# Patient Record
Sex: FEMALE | Race: WHITE | NOT HISPANIC OR LATINO
[De-identification: names, ages, dates, MRNs, and addresses within clinical notes are randomized per-mention and may not be internally consistent; named-entity substitution may affect disease eponyms.]

---

## 2018-11-13 ENCOUNTER — OTHER (OUTPATIENT)
Age: 83
End: 2018-11-13

## 2018-12-10 ENCOUNTER — APPOINTMENT (OUTPATIENT)
Dept: INTERNAL MEDICINE | Facility: CLINIC | Age: 83
End: 2018-12-10

## 2019-03-04 ENCOUNTER — RX RENEWAL (OUTPATIENT)
Age: 84
End: 2019-03-04

## 2019-04-26 ENCOUNTER — APPOINTMENT (OUTPATIENT)
Dept: INTERNAL MEDICINE | Facility: CLINIC | Age: 84
End: 2019-04-26

## 2019-05-02 ENCOUNTER — RECORD ABSTRACTING (OUTPATIENT)
Age: 84
End: 2019-05-02

## 2019-05-02 ENCOUNTER — APPOINTMENT (OUTPATIENT)
Dept: INTERNAL MEDICINE | Facility: CLINIC | Age: 84
End: 2019-05-02
Payer: MEDICARE

## 2019-05-02 VITALS — WEIGHT: 165 LBS | BODY MASS INDEX: 31.97 KG/M2 | HEIGHT: 60.25 IN

## 2019-05-02 VITALS — SYSTOLIC BLOOD PRESSURE: 115 MMHG | DIASTOLIC BLOOD PRESSURE: 70 MMHG

## 2019-05-02 DIAGNOSIS — Z87.891 PERSONAL HISTORY OF NICOTINE DEPENDENCE: ICD-10-CM

## 2019-05-02 DIAGNOSIS — S99.929A UNSPECIFIED INJURY OF UNSPECIFIED FOOT, INITIAL ENCOUNTER: ICD-10-CM

## 2019-05-02 DIAGNOSIS — R19.4 CHANGE IN BOWEL HABIT: ICD-10-CM

## 2019-05-02 DIAGNOSIS — Z85.828 PERSONAL HISTORY OF OTHER MALIGNANT NEOPLASM OF SKIN: ICD-10-CM

## 2019-05-02 DIAGNOSIS — I50.30 UNSPECIFIED DIASTOLIC (CONGESTIVE) HEART FAILURE: ICD-10-CM

## 2019-05-02 DIAGNOSIS — Z80.0 FAMILY HISTORY OF MALIGNANT NEOPLASM OF DIGESTIVE ORGANS: ICD-10-CM

## 2019-05-02 DIAGNOSIS — Z87.19 PERSONAL HISTORY OF OTHER DISEASES OF THE DIGESTIVE SYSTEM: ICD-10-CM

## 2019-05-02 DIAGNOSIS — R63.4 ABNORMAL WEIGHT LOSS: ICD-10-CM

## 2019-05-02 DIAGNOSIS — Z92.89 PERSONAL HISTORY OF OTHER MEDICAL TREATMENT: ICD-10-CM

## 2019-05-02 LAB — INR PPP: 3.1 RATIO

## 2019-05-02 PROCEDURE — 99214 OFFICE O/P EST MOD 30 MIN: CPT | Mod: 25

## 2019-05-02 PROCEDURE — 85610 PROTHROMBIN TIME: CPT | Mod: QW

## 2019-05-02 RX ORDER — UBIDECARENONE/VIT E ACET 100MG-5
1000 CAPSULE ORAL DAILY
Refills: 0 | Status: ACTIVE | COMMUNITY

## 2019-05-02 RX ORDER — ASPIRIN 81 MG/1
81 TABLET ORAL
Qty: 90 | Refills: 0 | Status: ACTIVE | COMMUNITY

## 2019-05-02 NOTE — REVIEW OF SYSTEMS
[Lower Ext Edema] : lower extremity edema [Dyspnea on Exertion] : dyspnea on exertion [Joint Pain] : joint pain [Joint Stiffness] : joint stiffness [Back Pain] : back pain [Negative] : Heme/Lymph [Chest Pain] : no chest pain [Palpitations] : no palpitations [Leg Claudication] : no leg claudication [Orthopnea] : no orthopnea [Paroysmal Nocturnal Dyspnea] : no paroysmal nocturnal dyspnea [Wheezing] : no wheezing [Cough] : no cough [Muscle Pain] : no muscle pain

## 2019-05-02 NOTE — PHYSICAL EXAM
[No Acute Distress] : no acute distress [Well Nourished] : well nourished [Well Developed] : well developed [Well-Appearing] : well-appearing [Normal Sclera/Conjunctiva] : normal sclera/conjunctiva [PERRL] : pupils equal round and reactive to light [EOMI] : extraocular movements intact [Normal Outer Ear/Nose] : the outer ears and nose were normal in appearance [Normal Oropharynx] : the oropharynx was normal [No JVD] : no jugular venous distention [Supple] : supple [No Lymphadenopathy] : no lymphadenopathy [Thyroid Normal, No Nodules] : the thyroid was normal and there were no nodules present [No Respiratory Distress] : no respiratory distress  [Clear to Auscultation] : lungs were clear to auscultation bilaterally [No Accessory Muscle Use] : no accessory muscle use [Normal Rate] : normal rate  [Regular Rhythm] : with a regular rhythm [Normal S1, S2] : normal S1 and S2 [II] : a grade 2 [I] : a grade 1 [___ +] : bilateral [unfilled]U+ pretibial pitting edema [No Carotid Bruits] : no carotid bruits [No Abdominal Bruit] : a ~M bruit was not heard ~T in the abdomen [Pedal Pulses Present] : the pedal pulses are present [No Extremity Clubbing/Cyanosis] : no extremity clubbing/cyanosis [No Palpable Aorta] : no palpable aorta [Soft] : abdomen soft [Non Tender] : non-tender [Non-distended] : non-distended [No Masses] : no abdominal mass palpated [No HSM] : no HSM [Normal Bowel Sounds] : normal bowel sounds [Normal Posterior Cervical Nodes] : no posterior cervical lymphadenopathy [Normal Anterior Cervical Nodes] : no anterior cervical lymphadenopathy [No CVA Tenderness] : no CVA  tenderness [No Spinal Tenderness] : no spinal tenderness [No Joint Swelling] : no joint swelling [Grossly Normal Strength/Tone] : grossly normal strength/tone [No Rash] : no rash [Coordination Grossly Intact] : coordination grossly intact [No Focal Deficits] : no focal deficits [Deep Tendon Reflexes (DTR)] : deep tendon reflexes were 2+ and symmetric [Normal Affect] : the affect was normal [Normal Insight/Judgement] : insight and judgment were intact

## 2019-05-02 NOTE — PLAN
[FreeTextEntry1] : Decrease warfarin to half pill Monday Wednesday Friday and Saturday and a whole pill on Tuesday Thursday and Sunday\par Same other medicines

## 2019-05-02 NOTE — HISTORY OF PRESENT ILLNESS
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

## 2019-05-02 NOTE — HISTORY OF PRESENT ILLNESS
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

## 2019-05-30 ENCOUNTER — APPOINTMENT (OUTPATIENT)
Dept: INTERNAL MEDICINE | Facility: CLINIC | Age: 84
End: 2019-05-30
Payer: MEDICARE

## 2019-05-30 VITALS
HEIGHT: 60.25 IN | SYSTOLIC BLOOD PRESSURE: 130 MMHG | DIASTOLIC BLOOD PRESSURE: 68 MMHG | BODY MASS INDEX: 31.78 KG/M2 | WEIGHT: 164 LBS

## 2019-05-30 LAB — INR PPP: 2.5 RATIO

## 2019-05-30 PROCEDURE — 99214 OFFICE O/P EST MOD 30 MIN: CPT | Mod: 25

## 2019-05-30 PROCEDURE — 85610 PROTHROMBIN TIME: CPT | Mod: QW

## 2019-05-30 NOTE — HISTORY OF PRESENT ILLNESS
[FreeTextEntry1] : Here to follow ongoing problems as well as check INR on warfarin [de-identified] :  past deep venous thrombosis and pulmonary embolus on lifelong Coumadin\par Slow gradual weight loss over the years continues with overall improvement in her clinical state\par Negative workups for this in the past\par Chronic dyspnea on exertion which is improved \par limited by legs and by back issues\par No falls\par  right bundle branch block and venous stasis dermatitis\par Hypertension well controlled\par Hyperlipidemia well controlled

## 2019-05-30 NOTE — ASSESSMENT
[FreeTextEntry1] : INR perfect at 2.5\par doing very well with excellent control of blood pressure\par venous stasis changes and mild edema\par last A1C 5.9 will check next visit

## 2019-06-27 ENCOUNTER — APPOINTMENT (OUTPATIENT)
Dept: INTERNAL MEDICINE | Facility: CLINIC | Age: 84
End: 2019-06-27
Payer: MEDICARE

## 2019-06-27 VITALS
DIASTOLIC BLOOD PRESSURE: 70 MMHG | BODY MASS INDEX: 31.78 KG/M2 | HEIGHT: 60.25 IN | SYSTOLIC BLOOD PRESSURE: 132 MMHG | WEIGHT: 164 LBS

## 2019-06-27 LAB — INR PPP: 1.8 RATIO

## 2019-06-27 PROCEDURE — 85610 PROTHROMBIN TIME: CPT | Mod: QW

## 2019-06-27 PROCEDURE — 99213 OFFICE O/P EST LOW 20 MIN: CPT | Mod: 25

## 2019-06-27 NOTE — PHYSICAL EXAM
[No Acute Distress] : no acute distress [Well Nourished] : well nourished [Well Developed] : well developed [Well-Appearing] : well-appearing [Normal Sclera/Conjunctiva] : normal sclera/conjunctiva [PERRL] : pupils equal round and reactive to light [EOMI] : extraocular movements intact [Normal Outer Ear/Nose] : the outer ears and nose were normal in appearance [Normal Oropharynx] : the oropharynx was normal [No JVD] : no jugular venous distention [Supple] : supple [Thyroid Normal, No Nodules] : the thyroid was normal and there were no nodules present [No Lymphadenopathy] : no lymphadenopathy [No Respiratory Distress] : no respiratory distress  [Clear to Auscultation] : lungs were clear to auscultation bilaterally [Regular Rhythm] : with a regular rhythm [No Accessory Muscle Use] : no accessory muscle use [Normal S1, S2] : normal S1 and S2 [Bradycardia] : bradycardic [II] : a grade 2 [I] : a grade 1 [___ +] : bilateral [unfilled]U+ pretibial pitting edema [No Carotid Bruits] : no carotid bruits [No Abdominal Bruit] : a ~M bruit was not heard ~T in the abdomen [Pedal Pulses Present] : the pedal pulses are present [No Extremity Clubbing/Cyanosis] : no extremity clubbing/cyanosis [No Palpable Aorta] : no palpable aorta [Soft] : abdomen soft [Non Tender] : non-tender [Non-distended] : non-distended [No HSM] : no HSM [No Masses] : no abdominal mass palpated [Normal Posterior Cervical Nodes] : no posterior cervical lymphadenopathy [Normal Bowel Sounds] : normal bowel sounds [Normal Anterior Cervical Nodes] : no anterior cervical lymphadenopathy [No CVA Tenderness] : no CVA  tenderness [No Spinal Tenderness] : no spinal tenderness [No Joint Swelling] : no joint swelling [No Rash] : no rash [Grossly Normal Strength/Tone] : grossly normal strength/tone [Coordination Grossly Intact] : coordination grossly intact [No Focal Deficits] : no focal deficits [Deep Tendon Reflexes (DTR)] : deep tendon reflexes were 2+ and symmetric [Normal Affect] : the affect was normal [Normal Insight/Judgement] : insight and judgment were intact

## 2019-06-27 NOTE — REVIEW OF SYSTEMS
[Lower Ext Edema] : lower extremity edema [Joint Pain] : joint pain [Joint Stiffness] : joint stiffness [Back Pain] : back pain [Negative] : Heme/Lymph [Leg Claudication] : no leg claudication [Chest Pain] : no chest pain [Palpitations] : no palpitations [Wheezing] : no wheezing [Paroysmal Nocturnal Dyspnea] : no paroysmal nocturnal dyspnea [Orthopnea] : no orthopnea [Cough] : no cough [Dyspnea on Exertion] : no dyspnea on exertion [Muscle Pain] : no muscle pain

## 2019-06-27 NOTE — HISTORY OF PRESENT ILLNESS
[FreeTextEntry1] : here to follow ongoing problems as well as check INR on warfarin [de-identified] :  past deep venous thrombosis and pulmonary embolus on lifelong Coumadin\par dyspnea on exertion which is improved \par limited by legs and by back issues\par No falls\par  right bundle branch block and venous stasis dermatitis\par Hypertension well controlled\par Hyperlipidemia well controlled\par

## 2019-06-27 NOTE — ASSESSMENT
[FreeTextEntry1] : INR low at 1.8\par doing very well\par BP well controlled\par venous stasis changes and mild edema

## 2019-07-18 ENCOUNTER — APPOINTMENT (OUTPATIENT)
Dept: INTERNAL MEDICINE | Facility: CLINIC | Age: 84
End: 2019-07-18
Payer: MEDICARE

## 2019-07-18 ENCOUNTER — RX RENEWAL (OUTPATIENT)
Age: 84
End: 2019-07-18

## 2019-07-18 VITALS — DIASTOLIC BLOOD PRESSURE: 70 MMHG | SYSTOLIC BLOOD PRESSURE: 130 MMHG

## 2019-07-18 VITALS — HEIGHT: 60.25 IN | BODY MASS INDEX: 31.39 KG/M2 | WEIGHT: 162 LBS

## 2019-07-18 LAB — INR PPP: 2.7 RATIO

## 2019-07-18 PROCEDURE — 85610 PROTHROMBIN TIME: CPT | Mod: QW

## 2019-07-18 PROCEDURE — 99214 OFFICE O/P EST MOD 30 MIN: CPT | Mod: 25

## 2019-07-18 NOTE — REVIEW OF SYSTEMS
[Lower Ext Edema] : lower extremity edema [Joint Pain] : joint pain [Joint Stiffness] : joint stiffness [Back Pain] : back pain [Negative] : Heme/Lymph [Chest Pain] : no chest pain [Palpitations] : no palpitations [Leg Claudication] : no leg claudication [Orthopnea] : no orthopnea [Paroysmal Nocturnal Dyspnea] : no paroysmal nocturnal dyspnea [Wheezing] : no wheezing [Cough] : no cough [Dyspnea on Exertion] : no dyspnea on exertion [Muscle Pain] : no muscle pain

## 2019-07-18 NOTE — HISTORY OF PRESENT ILLNESS
[FreeTextEntry1] : here to follow ongoing problems as well as check INR on warfarin [de-identified] :  past deep venous thrombosis and pulmonary embolus on lifelong Coumadin\par dyspnea on exertion which is improved \par limited by legs and by back issues, no falls\par  right bundle branch block and venous stasis dermatitis chronic\par hypertension and hyperlipidemia well controlled\par INR 2.7\par warfarin was increased by 1/2 pill weekly last visit\par NOTES MORE RIGHT L,EG SWELLING\par RIGHT KNEE ARTHRITIS HAS BEEN WORSE

## 2019-07-18 NOTE — PHYSICAL EXAM
[No Acute Distress] : no acute distress [Well Nourished] : well nourished [Well Developed] : well developed [Well-Appearing] : well-appearing [Normal Sclera/Conjunctiva] : normal sclera/conjunctiva [PERRL] : pupils equal round and reactive to light [EOMI] : extraocular movements intact [Normal Outer Ear/Nose] : the outer ears and nose were normal in appearance [Normal Oropharynx] : the oropharynx was normal [No JVD] : no jugular venous distention [Supple] : supple [No Lymphadenopathy] : no lymphadenopathy [Thyroid Normal, No Nodules] : the thyroid was normal and there were no nodules present [No Respiratory Distress] : no respiratory distress  [Clear to Auscultation] : lungs were clear to auscultation bilaterally [No Accessory Muscle Use] : no accessory muscle use [Regular Rhythm] : with a regular rhythm [Normal S1, S2] : normal S1 and S2 [Bradycardia] : bradycardic [II] : a grade 2 [I] : a grade 1 [___ +] : bilateral [unfilled]U+ pretibial pitting edema [No Carotid Bruits] : no carotid bruits [No Abdominal Bruit] : a ~M bruit was not heard ~T in the abdomen [Pedal Pulses Present] : the pedal pulses are present [No Extremity Clubbing/Cyanosis] : no extremity clubbing/cyanosis [No Palpable Aorta] : no palpable aorta [___+] : [unfilled]U+ pretibial pitting edema on the left [Soft] : abdomen soft [Non Tender] : non-tender [Non-distended] : non-distended [No Masses] : no abdominal mass palpated [No HSM] : no HSM [Normal Bowel Sounds] : normal bowel sounds [Normal Posterior Cervical Nodes] : no posterior cervical lymphadenopathy [Normal Anterior Cervical Nodes] : no anterior cervical lymphadenopathy [No CVA Tenderness] : no CVA  tenderness [No Spinal Tenderness] : no spinal tenderness [No Joint Swelling] : no joint swelling [Grossly Normal Strength/Tone] : grossly normal strength/tone [No Rash] : no rash [Coordination Grossly Intact] : coordination grossly intact [No Focal Deficits] : no focal deficits [Deep Tendon Reflexes (DTR)] : deep tendon reflexes were 2+ and symmetric [Normal Affect] : the affect was normal [Normal Insight/Judgement] : insight and judgment were intact [de-identified] : FRAGILE SKIN MINOR BRUISING

## 2019-07-18 NOTE — ASSESSMENT
[FreeTextEntry1] : INR PERFECT at 2.7\par doing very well\par BP well controlled\par venous stasis changes but RLE edema worse

## 2019-08-01 ENCOUNTER — RX RENEWAL (OUTPATIENT)
Age: 84
End: 2019-08-01

## 2019-08-02 ENCOUNTER — MEDICATION RENEWAL (OUTPATIENT)
Age: 84
End: 2019-08-02

## 2019-08-02 ENCOUNTER — OTHER (OUTPATIENT)
Age: 84
End: 2019-08-02

## 2019-08-13 ENCOUNTER — APPOINTMENT (OUTPATIENT)
Dept: INTERNAL MEDICINE | Facility: CLINIC | Age: 84
End: 2019-08-13
Payer: MEDICARE

## 2019-08-13 VITALS
WEIGHT: 160 LBS | RESPIRATION RATE: 16 BRPM | BODY MASS INDEX: 31.41 KG/M2 | HEART RATE: 59 BPM | DIASTOLIC BLOOD PRESSURE: 73 MMHG | OXYGEN SATURATION: 99 % | SYSTOLIC BLOOD PRESSURE: 190 MMHG | HEIGHT: 60 IN

## 2019-08-13 LAB — INR PPP: 3.8 RATIO

## 2019-08-13 PROCEDURE — 99213 OFFICE O/P EST LOW 20 MIN: CPT | Mod: 25

## 2019-08-13 PROCEDURE — 85610 PROTHROMBIN TIME: CPT | Mod: QW

## 2019-08-13 NOTE — HISTORY OF PRESENT ILLNESS
[de-identified] :  past deep venous thrombosis and pulmonary embolus on lifelong Coumadin\par dyspnea on exertion still improved \par limited by legs and by back issues, no falls\par  right bundle branch block and venous stasis dermatitis chronic\par hypertension and hyperlipidemia well controlled\par INR 3.8\par Bakers cyst right \par cause of rise in INR not obvious, no antibiotics or change in diet [FreeTextEntry1] : here to follow ongoing problems as well as check INR on warfarin

## 2019-08-13 NOTE — PHYSICAL EXAM
[No Acute Distress] : no acute distress [Well Nourished] : well nourished [Well Developed] : well developed [Well-Appearing] : well-appearing [Normal Sclera/Conjunctiva] : normal sclera/conjunctiva [Normal Outer Ear/Nose] : the outer ears and nose were normal in appearance [Normal Oropharynx] : the oropharynx was normal [No JVD] : no jugular venous distention [Supple] : supple [No Lymphadenopathy] : no lymphadenopathy [Thyroid Normal, No Nodules] : the thyroid was normal and there were no nodules present [No Respiratory Distress] : no respiratory distress  [Clear to Auscultation] : lungs were clear to auscultation bilaterally [Regular Rhythm] : with a regular rhythm [No Accessory Muscle Use] : no accessory muscle use [Normal S1, S2] : normal S1 and S2 [Bradycardia] : bradycardic [II] : a grade 2 [I] : a grade 1 [___ +] : bilateral [unfilled]U+ pretibial pitting edema [No Carotid Bruits] : no carotid bruits [No Abdominal Bruit] : a ~M bruit was not heard ~T in the abdomen [No Extremity Clubbing/Cyanosis] : no extremity clubbing/cyanosis [Pedal Pulses Present] : the pedal pulses are present [No Palpable Aorta] : no palpable aorta [___+] : [unfilled]U+ pretibial pitting edema on the left [Soft] : abdomen soft [Non Tender] : non-tender [Non-distended] : non-distended [No Masses] : no abdominal mass palpated [Normal Bowel Sounds] : normal bowel sounds [No HSM] : no HSM [Normal Posterior Cervical Nodes] : no posterior cervical lymphadenopathy [No CVA Tenderness] : no CVA  tenderness [Normal Anterior Cervical Nodes] : no anterior cervical lymphadenopathy [No Spinal Tenderness] : no spinal tenderness [No Joint Swelling] : no joint swelling [Grossly Normal Strength/Tone] : grossly normal strength/tone [No Rash] : no rash [Coordination Grossly Intact] : coordination grossly intact [No Focal Deficits] : no focal deficits [Deep Tendon Reflexes (DTR)] : deep tendon reflexes were 2+ and symmetric [Normal Affect] : the affect was normal [Normal Insight/Judgement] : insight and judgment were intact

## 2019-08-13 NOTE — REVIEW OF SYSTEMS
[Palpitations] : no palpitations [Chest Pain] : no chest pain [Leg Claudication] : no leg claudication [Lower Ext Edema] : lower extremity edema [Orthopnea] : no orthopnea [Paroysmal Nocturnal Dyspnea] : no paroysmal nocturnal dyspnea [Wheezing] : no wheezing [Cough] : no cough [Dyspnea on Exertion] : no dyspnea on exertion [Joint Pain] : joint pain [Joint Stiffness] : no joint stiffness [Muscle Pain] : no muscle pain [Back Pain] : back pain [Negative] : Heme/Lymph

## 2019-08-13 NOTE — ASSESSMENT
[FreeTextEntry1] : Hold warfarin for one day then reduce dose to one half pill 3 times a week and a whole pill 4 times a week\par Return to check INR and pressure in one week\par  ruptured Baker cyst on right lower extremity causing some asymmetry in her leg edema

## 2019-08-13 NOTE — PLAN
[FreeTextEntry1] : Further adjustments in medications to be made pending evaluation and followup visits

## 2019-08-20 ENCOUNTER — APPOINTMENT (OUTPATIENT)
Dept: INTERNAL MEDICINE | Facility: CLINIC | Age: 84
End: 2019-08-20
Payer: MEDICARE

## 2019-08-20 VITALS
DIASTOLIC BLOOD PRESSURE: 62 MMHG | HEIGHT: 60 IN | SYSTOLIC BLOOD PRESSURE: 191 MMHG | HEART RATE: 52 BPM | WEIGHT: 160 LBS | RESPIRATION RATE: 16 BRPM | BODY MASS INDEX: 31.41 KG/M2 | OXYGEN SATURATION: 95 %

## 2019-08-20 LAB — INR PPP: 2.6 RATIO

## 2019-08-20 PROCEDURE — 99213 OFFICE O/P EST LOW 20 MIN: CPT | Mod: 25

## 2019-08-20 PROCEDURE — 85610 PROTHROMBIN TIME: CPT | Mod: QW

## 2019-09-11 ENCOUNTER — APPOINTMENT (OUTPATIENT)
Dept: INTERNAL MEDICINE | Facility: CLINIC | Age: 84
End: 2019-09-11
Payer: MEDICARE

## 2019-09-11 PROCEDURE — 99214 OFFICE O/P EST MOD 30 MIN: CPT | Mod: 25

## 2019-09-11 PROCEDURE — 85610 PROTHROMBIN TIME: CPT | Mod: QW

## 2019-09-12 VITALS
DIASTOLIC BLOOD PRESSURE: 78 MMHG | SYSTOLIC BLOOD PRESSURE: 130 MMHG | HEIGHT: 60 IN | BODY MASS INDEX: 31.41 KG/M2 | WEIGHT: 160 LBS

## 2019-09-12 LAB — INR PPP: 1.9 RATIO

## 2019-09-12 NOTE — HISTORY OF PRESENT ILLNESS
[FreeTextEntry1] : here to follow INR [de-identified] :  past deep venous thrombosis and pulmonary embolus on Coumadin\par dyspnea on exertion same\par limited by legs and by back issues, no falls venous stasis dermatitis chronic\par hypertension and hyperlipidemia well controlled\par INR 1.9\par Bakers cyst right \par

## 2019-09-12 NOTE — PHYSICAL EXAM
[No Acute Distress] : no acute distress [Well Nourished] : well nourished [Well Developed] : well developed [Well-Appearing] : well-appearing [Normal Sclera/Conjunctiva] : normal sclera/conjunctiva [PERRL] : pupils equal round and reactive to light [EOMI] : extraocular movements intact [Normal Outer Ear/Nose] : the outer ears and nose were normal in appearance [Normal Oropharynx] : the oropharynx was normal [No JVD] : no jugular venous distention [Supple] : supple [No Lymphadenopathy] : no lymphadenopathy [Thyroid Normal, No Nodules] : the thyroid was normal and there were no nodules present [No Respiratory Distress] : no respiratory distress  [Clear to Auscultation] : lungs were clear to auscultation bilaterally [No Accessory Muscle Use] : no accessory muscle use [Regular Rhythm] : with a regular rhythm [Normal S1, S2] : normal S1 and S2 [Bradycardia] : bradycardic [II] : a grade 2 [I] : a grade 1 [___ +] : bilateral [unfilled]U+ pretibial pitting edema [No Carotid Bruits] : no carotid bruits [No Abdominal Bruit] : a ~M bruit was not heard ~T in the abdomen [Pedal Pulses Present] : the pedal pulses are present [No Extremity Clubbing/Cyanosis] : no extremity clubbing/cyanosis [No Palpable Aorta] : no palpable aorta [___+] : [unfilled]U+ pretibial pitting edema on the left [Soft] : abdomen soft [Non Tender] : non-tender [Non-distended] : non-distended [No Masses] : no abdominal mass palpated [No HSM] : no HSM [Normal Posterior Cervical Nodes] : no posterior cervical lymphadenopathy [Normal Bowel Sounds] : normal bowel sounds [Normal Anterior Cervical Nodes] : no anterior cervical lymphadenopathy [No CVA Tenderness] : no CVA  tenderness [No Spinal Tenderness] : no spinal tenderness [No Joint Swelling] : no joint swelling [Grossly Normal Strength/Tone] : grossly normal strength/tone [No Rash] : no rash [Coordination Grossly Intact] : coordination grossly intact [Deep Tendon Reflexes (DTR)] : deep tendon reflexes were 2+ and symmetric [No Focal Deficits] : no focal deficits [Normal Insight/Judgement] : insight and judgment were intact [Normal Affect] : the affect was normal [de-identified] : FRAGILE SKIN MINOR BRUISING

## 2019-09-12 NOTE — ASSESSMENT
[FreeTextEntry1] : Clinically unchanged\par No bradycardic symptoms\par INR low but failed one day of warfarin as she did not change up her schedule after being seen last week

## 2019-09-12 NOTE — REVIEW OF SYSTEMS
[Chest Pain] : no chest pain [Palpitations] : no palpitations [Leg Claudication] : no leg claudication [Lower Ext Edema] : lower extremity edema [Orthopnea] : no orthopnea [Paroysmal Nocturnal Dyspnea] : no paroysmal nocturnal dyspnea [Cough] : no cough [Wheezing] : no wheezing [Dyspnea on Exertion] : no dyspnea on exertion [Joint Stiffness] : no joint stiffness [Joint Pain] : joint pain [Muscle Pain] : no muscle pain [Back Pain] : back pain [Negative] : Heme/Lymph

## 2019-09-23 ENCOUNTER — RX RENEWAL (OUTPATIENT)
Age: 84
End: 2019-09-23

## 2019-09-25 ENCOUNTER — APPOINTMENT (OUTPATIENT)
Dept: INTERNAL MEDICINE | Facility: CLINIC | Age: 84
End: 2019-09-25

## 2019-09-26 ENCOUNTER — APPOINTMENT (OUTPATIENT)
Dept: INTERNAL MEDICINE | Facility: CLINIC | Age: 84
End: 2019-09-26

## 2019-09-26 ENCOUNTER — APPOINTMENT (OUTPATIENT)
Dept: INTERNAL MEDICINE | Facility: CLINIC | Age: 84
End: 2019-09-26
Payer: MEDICARE

## 2019-09-26 LAB — INR PPP: 2.5 RATIO

## 2019-09-26 PROCEDURE — 85610 PROTHROMBIN TIME: CPT | Mod: QW

## 2019-09-26 PROCEDURE — 99212 OFFICE O/P EST SF 10 MIN: CPT | Mod: 25

## 2019-09-26 NOTE — HISTORY OF PRESENT ILLNESS
[FreeTextEntry1] : here to follow INR [de-identified] :  past deep venous thrombosis and pulmonary embolus on Coumadin\par dyspnea on exertion same\par limited by legs and by back issues, no falls venous stasis dermatitis chronic\par hypertension and hyperlipidemia well controlled\par INR 2.5\par Bakers cyst right \par venous insufficiency\par

## 2019-09-26 NOTE — REVIEW OF SYSTEMS
[Chest Pain] : no chest pain [Palpitations] : no palpitations [Leg Claudication] : no leg claudication [Lower Ext Edema] : lower extremity edema [Orthopnea] : no orthopnea [Wheezing] : no wheezing [Cough] : no cough [Joint Pain] : joint pain [Dyspnea on Exertion] : no dyspnea on exertion [Joint Stiffness] : no joint stiffness [Muscle Pain] : no muscle pain [Back Pain] : back pain [Negative] : Heme/Lymph [de-identified] : small blister left ankle

## 2019-10-16 ENCOUNTER — APPOINTMENT (OUTPATIENT)
Dept: INTERNAL MEDICINE | Facility: CLINIC | Age: 84
End: 2019-10-16
Payer: MEDICARE

## 2019-10-16 VITALS — SYSTOLIC BLOOD PRESSURE: 130 MMHG | DIASTOLIC BLOOD PRESSURE: 70 MMHG

## 2019-10-16 LAB — INR PPP: 3 RATIO

## 2019-10-16 PROCEDURE — 85610 PROTHROMBIN TIME: CPT | Mod: QW

## 2019-10-16 PROCEDURE — 99213 OFFICE O/P EST LOW 20 MIN: CPT | Mod: 25

## 2019-10-16 NOTE — PHYSICAL EXAM
[No Acute Distress] : no acute distress [Well Nourished] : well nourished [Well Developed] : well developed [Normal Sclera/Conjunctiva] : normal sclera/conjunctiva [Normal Outer Ear/Nose] : the outer ears and nose were normal in appearance [Normal Oropharynx] : the oropharynx was normal [No JVD] : no jugular venous distention [No Lymphadenopathy] : no lymphadenopathy [No Respiratory Distress] : no respiratory distress  [No Accessory Muscle Use] : no accessory muscle use [Clear to Auscultation] : lungs were clear to auscultation bilaterally [Bradycardia] : bradycardic [Heart Rate ___] : [unfilled] bpm [Premature Beats] : regular with premature beats [Normal S1] : normal S1 [No Carotid Bruits] : no carotid bruits [Soft] : abdomen soft [No HSM] : no HSM [Normal Posterior Cervical Nodes] : no posterior cervical lymphadenopathy [Normal Anterior Cervical Nodes] : no anterior cervical lymphadenopathy [No CVA Tenderness] : no CVA  tenderness [No Joint Swelling] : no joint swelling [Grossly Normal Strength/Tone] : grossly normal strength/tone [No Rash] : no rash [Coordination Grossly Intact] : coordination grossly intact [Normal Affect] : the affect was normal [Normal Insight/Judgement] : insight and judgment were intact [Normal S2] : abnormal S2 [de-identified] : edema, venous insufficiency [de-identified] : small venous ulcer left ankle

## 2019-10-16 NOTE — HISTORY OF PRESENT ILLNESS
[FreeTextEntry1] : here to follow INR [de-identified] :  past deep venous thrombosis and pulmonary embolus on Coumadin\par dyspnea on exertion same\par limited by legs and by back issues, no falls venous stasis dermatitis chronic\par hypertension and hyperlipidemia well controlled\par INR 3.0\par venous insufficiency stable\par

## 2019-10-16 NOTE — REVIEW OF SYSTEMS
[Lower Ext Edema] : lower extremity edema [Joint Pain] : joint pain [Back Pain] : back pain [Negative] : Heme/Lymph [Chest Pain] : no chest pain [Palpitations] : no palpitations [Leg Claudication] : no leg claudication [Orthopnea] : no orthopnea [Wheezing] : no wheezing [Cough] : no cough [Dyspnea on Exertion] : no dyspnea on exertion [Joint Stiffness] : no joint stiffness [Muscle Pain] : no muscle pain [de-identified] : small blister left ankle

## 2019-10-20 ENCOUNTER — RX RENEWAL (OUTPATIENT)
Age: 84
End: 2019-10-20

## 2019-10-23 ENCOUNTER — MEDICATION RENEWAL (OUTPATIENT)
Age: 84
End: 2019-10-23

## 2019-11-07 ENCOUNTER — APPOINTMENT (OUTPATIENT)
Dept: INTERNAL MEDICINE | Facility: CLINIC | Age: 84
End: 2019-11-07
Payer: MEDICARE

## 2019-11-07 VITALS — BODY MASS INDEX: 32.2 KG/M2 | HEIGHT: 60 IN | WEIGHT: 164 LBS

## 2019-11-07 VITALS — SYSTOLIC BLOOD PRESSURE: 120 MMHG | DIASTOLIC BLOOD PRESSURE: 72 MMHG

## 2019-11-07 LAB — INR PPP: 2.2 RATIO

## 2019-11-07 PROCEDURE — 85610 PROTHROMBIN TIME: CPT | Mod: QW

## 2019-11-07 PROCEDURE — 99213 OFFICE O/P EST LOW 20 MIN: CPT | Mod: 25

## 2019-11-07 NOTE — HISTORY OF PRESENT ILLNESS
[de-identified] :  past deep venous thrombosis and pulmonary embolus on Coumadin\par dyspnea on exertion same\par limited by legs and by back issues, no falls venous stasis dermatitis chronic\par hypertension and hyperlipidemia \par INR 2.2 [FreeTextEntry1] : here to follow INR

## 2019-11-07 NOTE — PHYSICAL EXAM
[Normal Sclera/Conjunctiva] : normal sclera/conjunctiva [Well Nourished] : well nourished [Well Developed] : well developed [No Acute Distress] : no acute distress [No JVD] : no jugular venous distention [Normal Oropharynx] : the oropharynx was normal [Normal Outer Ear/Nose] : the outer ears and nose were normal in appearance [No Lymphadenopathy] : no lymphadenopathy [No Respiratory Distress] : no respiratory distress  [No Accessory Muscle Use] : no accessory muscle use [Bradycardia] : bradycardic [Clear to Auscultation] : lungs were clear to auscultation bilaterally [Normal S1] : normal S1 [Premature Beats] : regular with premature beats [Normal S2] : abnormal S2 [No Carotid Bruits] : no carotid bruits [Soft] : abdomen soft [I] : a grade 1 [Normal Anterior Cervical Nodes] : no anterior cervical lymphadenopathy [Normal Posterior Cervical Nodes] : no posterior cervical lymphadenopathy [No HSM] : no HSM [No CVA Tenderness] : no CVA  tenderness [Grossly Normal Strength/Tone] : grossly normal strength/tone [No Joint Swelling] : no joint swelling [Normal Affect] : the affect was normal [Coordination Grossly Intact] : coordination grossly intact [No Rash] : no rash [de-identified] : small venous ulcer left ankle [de-identified] : edema, venous insufficiency [Normal Insight/Judgement] : insight and judgment were intact

## 2019-11-12 ENCOUNTER — APPOINTMENT (OUTPATIENT)
Dept: INTERNAL MEDICINE | Facility: CLINIC | Age: 84
End: 2019-11-12
Payer: MEDICARE

## 2019-11-12 ENCOUNTER — NON-APPOINTMENT (OUTPATIENT)
Age: 84
End: 2019-11-12

## 2019-11-12 VITALS
DIASTOLIC BLOOD PRESSURE: 60 MMHG | HEART RATE: 37 BPM | BODY MASS INDEX: 32 KG/M2 | WEIGHT: 163 LBS | SYSTOLIC BLOOD PRESSURE: 126 MMHG | HEIGHT: 60 IN

## 2019-11-12 DIAGNOSIS — I45.10 UNSPECIFIED RIGHT BUNDLE-BRANCH BLOCK: ICD-10-CM

## 2019-11-12 PROCEDURE — G0439: CPT

## 2019-11-12 PROCEDURE — 93000 ELECTROCARDIOGRAM COMPLETE: CPT

## 2019-11-12 PROCEDURE — 81003 URINALYSIS AUTO W/O SCOPE: CPT | Mod: QW

## 2019-11-12 PROCEDURE — G0444 DEPRESSION SCREEN ANNUAL: CPT | Mod: 59

## 2019-11-12 RX ORDER — FLUOCINONIDE 0.5 MG/G
0.05 CREAM TOPICAL 3 TIMES DAILY
Qty: 60 | Refills: 1 | Status: COMPLETED | COMMUNITY
Start: 2018-11-13 | End: 2019-11-12

## 2019-11-12 RX ORDER — SILVER SULFADIAZINE 10 MG/G
1 CREAM TOPICAL
Qty: 50 | Refills: 0 | Status: COMPLETED | COMMUNITY
Start: 2019-06-20 | End: 2019-11-12

## 2019-11-12 NOTE — PHYSICAL EXAM
[No Acute Distress] : no acute distress [Well Nourished] : well nourished [Well Developed] : well developed [Well-Appearing] : well-appearing [PERRL] : pupils equal round and reactive to light [Normal Sclera/Conjunctiva] : normal sclera/conjunctiva [EOMI] : extraocular movements intact [Normal Outer Ear/Nose] : the outer ears and nose were normal in appearance [Normal Oropharynx] : the oropharynx was normal [No JVD] : no jugular venous distention [No Lymphadenopathy] : no lymphadenopathy [Supple] : supple [Thyroid Normal, No Nodules] : the thyroid was normal and there were no nodules present [No Respiratory Distress] : no respiratory distress  [Clear to Auscultation] : lungs were clear to auscultation bilaterally [No Accessory Muscle Use] : no accessory muscle use [Regular Rhythm] : with a regular rhythm [Normal S1, S2] : normal S1 and S2 [No Carotid Bruits] : no carotid bruits [No Abdominal Bruit] : a ~M bruit was not heard ~T in the abdomen [No Varicosities] : no varicosities [Pedal Pulses Present] : the pedal pulses are present [No Palpable Aorta] : no palpable aorta [No Extremity Clubbing/Cyanosis] : no extremity clubbing/cyanosis [Soft] : abdomen soft [Non Tender] : non-tender [Non-distended] : non-distended [No HSM] : no HSM [Normal Posterior Cervical Nodes] : no posterior cervical lymphadenopathy [Normal Bowel Sounds] : normal bowel sounds [Normal Anterior Cervical Nodes] : no anterior cervical lymphadenopathy [No CVA Tenderness] : no CVA  tenderness [No Spinal Tenderness] : no spinal tenderness [No Joint Swelling] : no joint swelling [Grossly Normal Strength/Tone] : grossly normal strength/tone [No Rash] : no rash [Coordination Grossly Intact] : coordination grossly intact [No Focal Deficits] : no focal deficits [Deep Tendon Reflexes (DTR)] : deep tendon reflexes were 2+ and symmetric [Normal Gait] : normal gait [Normal Affect] : the affect was normal [Normal Insight/Judgement] : insight and judgment were intact [Bradycardia] : bradycardic [I] : a grade 1 [___ +] : bilateral [unfilled]U+ pretibial pitting edema [Normal Appearance] : normal in appearance [No Nipple Discharge] : no nipple discharge [No Masses] : no palpable masses [No Axillary Lymphadenopathy] : no axillary lymphadenopathy [de-identified] : venous insufficiency [de-identified] : some skin lesions none overly suspicoous

## 2019-11-12 NOTE — REVIEW OF SYSTEMS
[Lower Ext Edema] : lower extremity edema [Joint Pain] : joint pain [Back Pain] : back pain [Negative] : Heme/Lymph [Earache] : no earache [Hearing Loss] : hearing loss [Hoarseness] : no hoarseness [Nasal Discharge] : no nasal discharge [Sore Throat] : no sore throat [Chest Pain] : no chest pain [Palpitations] : no palpitations [Leg Claudication] : no leg claudication [Orthopnea] : no orthopnea [Wheezing] : no wheezing [Dyspnea on Exertion] : no dyspnea on exertion [Cough] : no cough [Joint Stiffness] : no joint stiffness [Muscle Pain] : no muscle pain [de-identified] : small blister left ankle

## 2019-11-12 NOTE — HISTORY OF PRESENT ILLNESS
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

## 2019-11-12 NOTE — ASSESSMENT
[FreeTextEntry1] : Severe bradycardia that seems to be asymptomatic with significant conduction disease but no advanced heart block on current EKG\par Past pulmonary embolus on warfarin to check INR today\par Hypertension well controlled\par Venous insufficiency and edema

## 2019-11-13 ENCOUNTER — MEDICATION RENEWAL (OUTPATIENT)
Age: 84
End: 2019-11-13

## 2019-11-14 ENCOUNTER — RESULT REVIEW (OUTPATIENT)
Age: 84
End: 2019-11-14

## 2019-11-14 LAB
25(OH)D3 SERPL-MCNC: 40.8 NG/ML
ALBUMIN SERPL ELPH-MCNC: 3.9 G/DL
ALP BLD-CCNC: 67 U/L
ALT SERPL-CCNC: 15 U/L
ANION GAP SERPL CALC-SCNC: 10 MMOL/L
APPEARANCE: ABNORMAL
AST SERPL-CCNC: 19 U/L
BACTERIA UR CULT: NORMAL
BACTERIA: NEGATIVE
BASOPHILS # BLD AUTO: 0.03 K/UL
BASOPHILS NFR BLD AUTO: 0.5 %
BILIRUB SERPL-MCNC: 0.9 MG/DL
BILIRUBIN URINE: NEGATIVE
BLOOD URINE: ABNORMAL
BUN SERPL-MCNC: 26 MG/DL
CALCIUM SERPL-MCNC: 9.5 MG/DL
CHLORIDE SERPL-SCNC: 106 MMOL/L
CHOLEST SERPL-MCNC: 138 MG/DL
CHOLEST/HDLC SERPL: 1.9 RATIO
CK SERPL-CCNC: 104 U/L
CO2 SERPL-SCNC: 30 MMOL/L
COLOR: YELLOW
CREAT SERPL-MCNC: 1.18 MG/DL
EOSINOPHIL # BLD AUTO: 0.11 K/UL
EOSINOPHIL NFR BLD AUTO: 1.9 %
ESTIMATED AVERAGE GLUCOSE: 120 MG/DL
GLUCOSE QUALITATIVE U: NEGATIVE
GLUCOSE SERPL-MCNC: 101 MG/DL
HBA1C MFR BLD HPLC: 5.8 %
HCT VFR BLD CALC: 44.3 %
HDLC SERPL-MCNC: 72 MG/DL
HGB BLD-MCNC: 13.2 G/DL
HYALINE CASTS: 0 /LPF
IMM GRANULOCYTES NFR BLD AUTO: 0.2 %
INR PPP: 1.7 RATIO
KETONES URINE: NEGATIVE
LDLC SERPL CALC-MCNC: 57 MG/DL
LEUKOCYTE ESTERASE URINE: ABNORMAL
LYMPHOCYTES # BLD AUTO: 1.62 K/UL
LYMPHOCYTES NFR BLD AUTO: 28.4 %
MAN DIFF?: NORMAL
MCHC RBC-ENTMCNC: 27 PG
MCHC RBC-ENTMCNC: 29.8 GM/DL
MCV RBC AUTO: 90.8 FL
MICROSCOPIC-UA: NORMAL
MONOCYTES # BLD AUTO: 0.47 K/UL
MONOCYTES NFR BLD AUTO: 8.2 %
NEUTROPHILS # BLD AUTO: 3.46 K/UL
NEUTROPHILS NFR BLD AUTO: 60.8 %
NITRITE URINE: NEGATIVE
PH URINE: 6
PLATELET # BLD AUTO: 123 K/UL
POTASSIUM SERPL-SCNC: 5.1 MMOL/L
PROT SERPL-MCNC: 6.4 G/DL
PROTEIN URINE: NORMAL
PT BLD: 19.5 SEC
RBC # BLD: 4.88 M/UL
RBC # FLD: 13.3 %
RED BLOOD CELLS URINE: 10 /HPF
SODIUM SERPL-SCNC: 146 MMOL/L
SPECIFIC GRAVITY URINE: 1.03
SQUAMOUS EPITHELIAL CELLS: 16 /HPF
T4 FREE SERPL-MCNC: 1.1 NG/DL
TRIGL SERPL-MCNC: 47 MG/DL
TSH SERPL-ACNC: 2.5 UIU/ML
URINE COMMENTS: NORMAL
UROBILINOGEN URINE: NORMAL
VIT B12 SERPL-MCNC: 316 PG/ML
WBC # FLD AUTO: 5.7 K/UL
WHITE BLOOD CELLS URINE: 22 /HPF

## 2019-11-21 ENCOUNTER — INPATIENT (INPATIENT)
Facility: HOSPITAL | Age: 84
LOS: 1 days | Discharge: HOME | DRG: 244 | End: 2019-11-23
Attending: INTERNAL MEDICINE | Admitting: INTERNAL MEDICINE
Payer: MEDICARE

## 2019-11-21 VITALS
RESPIRATION RATE: 18 BRPM | DIASTOLIC BLOOD PRESSURE: 78 MMHG | OXYGEN SATURATION: 97 % | SYSTOLIC BLOOD PRESSURE: 212 MMHG | WEIGHT: 164.91 LBS | TEMPERATURE: 98 F | HEART RATE: 61 BPM | HEIGHT: 60 IN

## 2019-11-21 DIAGNOSIS — R00.1 BRADYCARDIA, UNSPECIFIED: ICD-10-CM

## 2019-11-21 DIAGNOSIS — I26.99 OTHER PULMONARY EMBOLISM WITHOUT ACUTE COR PULMONALE: ICD-10-CM

## 2019-11-21 LAB
ANION GAP SERPL CALC-SCNC: 15 MMOL/L — SIGNIFICANT CHANGE UP (ref 5–17)
APTT BLD: 42.3 SEC — HIGH (ref 27.5–36.3)
BASOPHILS # BLD AUTO: 0.02 K/UL — SIGNIFICANT CHANGE UP (ref 0–0.2)
BASOPHILS NFR BLD AUTO: 0.3 % — SIGNIFICANT CHANGE UP (ref 0–2)
BUN SERPL-MCNC: 25 MG/DL — HIGH (ref 7–23)
CALCIUM SERPL-MCNC: 9.3 MG/DL — SIGNIFICANT CHANGE UP (ref 8.4–10.5)
CHLORIDE SERPL-SCNC: 104 MMOL/L — SIGNIFICANT CHANGE UP (ref 96–108)
CO2 SERPL-SCNC: 25 MMOL/L — SIGNIFICANT CHANGE UP (ref 22–31)
CREAT SERPL-MCNC: 1.04 MG/DL — SIGNIFICANT CHANGE UP (ref 0.5–1.3)
EOSINOPHIL # BLD AUTO: 0.08 K/UL — SIGNIFICANT CHANGE UP (ref 0–0.5)
EOSINOPHIL NFR BLD AUTO: 1.3 % — SIGNIFICANT CHANGE UP (ref 0–6)
GLUCOSE SERPL-MCNC: 94 MG/DL — SIGNIFICANT CHANGE UP (ref 70–99)
HCT VFR BLD CALC: 43.8 % — SIGNIFICANT CHANGE UP (ref 34.5–45)
HGB BLD-MCNC: 13.6 G/DL — SIGNIFICANT CHANGE UP (ref 11.5–15.5)
IMM GRANULOCYTES NFR BLD AUTO: 0.2 % — SIGNIFICANT CHANGE UP (ref 0–1.5)
INR BLD: 3.32 RATIO — HIGH (ref 0.88–1.16)
LYMPHOCYTES # BLD AUTO: 1.36 K/UL — SIGNIFICANT CHANGE UP (ref 1–3.3)
LYMPHOCYTES # BLD AUTO: 21.7 % — SIGNIFICANT CHANGE UP (ref 13–44)
MCHC RBC-ENTMCNC: 27.3 PG — SIGNIFICANT CHANGE UP (ref 27–34)
MCHC RBC-ENTMCNC: 31.1 GM/DL — LOW (ref 32–36)
MCV RBC AUTO: 88 FL — SIGNIFICANT CHANGE UP (ref 80–100)
MONOCYTES # BLD AUTO: 0.41 K/UL — SIGNIFICANT CHANGE UP (ref 0–0.9)
MONOCYTES NFR BLD AUTO: 6.5 % — SIGNIFICANT CHANGE UP (ref 2–14)
NEUTROPHILS # BLD AUTO: 4.38 K/UL — SIGNIFICANT CHANGE UP (ref 1.8–7.4)
NEUTROPHILS NFR BLD AUTO: 70 % — SIGNIFICANT CHANGE UP (ref 43–77)
NRBC # BLD: 0 /100 WBCS — SIGNIFICANT CHANGE UP (ref 0–0)
PLATELET # BLD AUTO: 133 K/UL — LOW (ref 150–400)
POTASSIUM SERPL-MCNC: 4.3 MMOL/L — SIGNIFICANT CHANGE UP (ref 3.5–5.3)
POTASSIUM SERPL-SCNC: 4.3 MMOL/L — SIGNIFICANT CHANGE UP (ref 3.5–5.3)
PROTHROM AB SERPL-ACNC: 39.3 SEC — HIGH (ref 10–12.9)
RBC # BLD: 4.98 M/UL — SIGNIFICANT CHANGE UP (ref 3.8–5.2)
RBC # FLD: 13.3 % — SIGNIFICANT CHANGE UP (ref 10.3–14.5)
SODIUM SERPL-SCNC: 144 MMOL/L — SIGNIFICANT CHANGE UP (ref 135–145)
WBC # BLD: 6.26 K/UL — SIGNIFICANT CHANGE UP (ref 3.8–10.5)
WBC # FLD AUTO: 6.26 K/UL — SIGNIFICANT CHANGE UP (ref 3.8–10.5)

## 2019-11-21 PROCEDURE — 99285 EMERGENCY DEPT VISIT HI MDM: CPT

## 2019-11-21 PROCEDURE — 71045 X-RAY EXAM CHEST 1 VIEW: CPT | Mod: 26

## 2019-11-21 PROCEDURE — 93010 ELECTROCARDIOGRAM REPORT: CPT

## 2019-11-21 PROCEDURE — 93306 TTE W/DOPPLER COMPLETE: CPT | Mod: 26

## 2019-11-21 RX ORDER — DEXTROSE 50 % IN WATER 50 %
12.5 SYRINGE (ML) INTRAVENOUS ONCE
Refills: 0 | Status: DISCONTINUED | OUTPATIENT
Start: 2019-11-21 | End: 2019-11-23

## 2019-11-21 RX ORDER — ATORVASTATIN CALCIUM 80 MG/1
40 TABLET, FILM COATED ORAL AT BEDTIME
Refills: 0 | Status: DISCONTINUED | OUTPATIENT
Start: 2019-11-21 | End: 2019-11-23

## 2019-11-21 RX ORDER — POLYETHYLENE GLYCOL 3350 17 G/17G
1 POWDER, FOR SOLUTION ORAL
Qty: 0 | Refills: 0 | DISCHARGE

## 2019-11-21 RX ORDER — ASPIRIN/CALCIUM CARB/MAGNESIUM 324 MG
1 TABLET ORAL
Qty: 0 | Refills: 0 | DISCHARGE

## 2019-11-21 RX ORDER — DEXTROSE 50 % IN WATER 50 %
25 SYRINGE (ML) INTRAVENOUS ONCE
Refills: 0 | Status: DISCONTINUED | OUTPATIENT
Start: 2019-11-21 | End: 2019-11-23

## 2019-11-21 RX ORDER — DEXTROSE 50 % IN WATER 50 %
15 SYRINGE (ML) INTRAVENOUS ONCE
Refills: 0 | Status: DISCONTINUED | OUTPATIENT
Start: 2019-11-21 | End: 2019-11-23

## 2019-11-21 RX ORDER — ASPIRIN/CALCIUM CARB/MAGNESIUM 324 MG
81 TABLET ORAL DAILY
Refills: 0 | Status: DISCONTINUED | OUTPATIENT
Start: 2019-11-21 | End: 2019-11-23

## 2019-11-21 RX ORDER — LISINOPRIL 2.5 MG/1
1 TABLET ORAL
Qty: 0 | Refills: 0 | DISCHARGE

## 2019-11-21 RX ORDER — LISINOPRIL 2.5 MG/1
5 TABLET ORAL DAILY
Refills: 0 | Status: DISCONTINUED | OUTPATIENT
Start: 2019-11-21 | End: 2019-11-23

## 2019-11-21 RX ORDER — WARFARIN SODIUM 2.5 MG/1
1 TABLET ORAL
Qty: 0 | Refills: 0 | DISCHARGE

## 2019-11-21 RX ORDER — INSULIN LISPRO 100/ML
VIAL (ML) SUBCUTANEOUS AT BEDTIME
Refills: 0 | Status: DISCONTINUED | OUTPATIENT
Start: 2019-11-21 | End: 2019-11-23

## 2019-11-21 RX ORDER — POTASSIUM CHLORIDE 20 MEQ
1 PACKET (EA) ORAL
Qty: 0 | Refills: 0 | DISCHARGE

## 2019-11-21 RX ORDER — INSULIN LISPRO 100/ML
VIAL (ML) SUBCUTANEOUS
Refills: 0 | Status: DISCONTINUED | OUTPATIENT
Start: 2019-11-21 | End: 2019-11-23

## 2019-11-21 RX ORDER — SODIUM CHLORIDE 9 MG/ML
1000 INJECTION, SOLUTION INTRAVENOUS
Refills: 0 | Status: DISCONTINUED | OUTPATIENT
Start: 2019-11-21 | End: 2019-11-23

## 2019-11-21 RX ORDER — GLUCAGON INJECTION, SOLUTION 0.5 MG/.1ML
1 INJECTION, SOLUTION SUBCUTANEOUS ONCE
Refills: 0 | Status: DISCONTINUED | OUTPATIENT
Start: 2019-11-21 | End: 2019-11-23

## 2019-11-21 RX ORDER — FUROSEMIDE 40 MG
1 TABLET ORAL
Qty: 0 | Refills: 0 | DISCHARGE

## 2019-11-21 RX ORDER — FUROSEMIDE 40 MG
20 TABLET ORAL DAILY
Refills: 0 | Status: DISCONTINUED | OUTPATIENT
Start: 2019-11-21 | End: 2019-11-23

## 2019-11-21 RX ORDER — ATORVASTATIN CALCIUM 80 MG/1
1 TABLET, FILM COATED ORAL
Qty: 0 | Refills: 0 | DISCHARGE

## 2019-11-21 RX ADMIN — ATORVASTATIN CALCIUM 40 MILLIGRAM(S): 80 TABLET, FILM COATED ORAL at 21:27

## 2019-11-21 NOTE — ED PROVIDER NOTE - ATTENDING CONTRIBUTION TO CARE
Attending MD Vesna Willingham:   I personally have seen and examined this patient.  Physician assistant note reviewed and agree on plan of care and except where noted.  See HPI, PE, and MDM for details.

## 2019-11-21 NOTE — H&P ADULT - NSHPLABSRESULTS_GEN_ALL_CORE
LABS:	 	    CBC Full  -  ( 21 Nov 2019 10:11 )  WBC Count : 6.26 K/uL  Hemoglobin : 13.6 g/dL  Hematocrit : 43.8 %  Platelet Count - Automated : 133 K/uL  Mean Cell Volume : 88.0 fl  Mean Cell Hemoglobin : 27.3 pg  Mean Cell Hemoglobin Concentration : 31.1 gm/dL  Auto Neutrophil # : 4.38 K/uL  Auto Lymphocyte # : 1.36 K/uL  Auto Monocyte # : 0.41 K/uL  Auto Eosinophil # : 0.08 K/uL  Auto Basophil # : 0.02 K/uL  Auto Neutrophil % : 70.0 %  Auto Lymphocyte % : 21.7 %  Auto Monocyte % : 6.5 %  Auto Eosinophil % : 1.3 %  Auto Basophil % : 0.3 %    11-21    144  |  104  |  25<H>  ----------------------------<  94  4.3   |  25  |  1.04    Ca    9.3      21 Nov 2019 10:11      TELEMETRY: Marked sinus bradycardia with multiple pauses 2.8-2.9 secs      ECG: Sinus Bradycardia @ 54 bp, RBBB, AZ 318ms, QRS 152ms  	  RADIOLOGY:  EXAM:  XR CHEST AP OR PA 1V                            PROCEDURE DATE:  11/21/2019        INTERPRETATION:  Chest one view    HISTORY: Evaluation for pacemaker    COMPARISON STUDY: 6/17/2011    Frontal expiratory view of the chest shows the heart to be normal in   size. The lungs are clear and there is no evidence of pneumothorax nor   pleural effusion.    IMPRESSION:  No active pulmonary disease.    Thank you for the courtesy of this referral.        RICHELLE MCCABE M.D., ATTENDING RADIOLOGIST  This document has been electronically signed. Nov 21 2019 12:57PM

## 2019-11-21 NOTE — ED PROVIDER NOTE - PHYSICAL EXAMINATION
Attending Vesna Willingham: Gen: NAD, heent: atrauamtic, eomi, perrla, mmm, op pink, uvula midline, neck; nttp, no nuchal rigidity, chest: nttp, no crepitus, cv: rrr, no murmurs, lungs: ctab, abd: soft, nontender, nondistended, no peritoneal signs, +BS, no guarding, ext: wwp, neg homans,  b/l LE edemaskin: no rash, neuro: awake and alert, following commands, speech clear, sensation and strength intact, no focal deficits

## 2019-11-21 NOTE — H&P ADULT - NSHPREVIEWOFSYSTEMS_GEN_ALL_CORE
REVIEW OF SYSTEMS:  Constitutional: no fever or chills, no weight gain or weight loss  Neuro: denies vision change, dizziness, or lightheadedness; (+) posterior headache  Respiratory: denies sob, cough, or wheezing  Cardiac: denies chest pain or palpitations  GI: denies N/V, abdominal pain, diarrhea or constipation  : denies dysuria or hematuria  Ext: denies tingling or numbness

## 2019-11-21 NOTE — ED CLERICAL - NS ED CLERK NOTE PRE-ARRIVAL INFORMATION; ADDITIONAL PRE-ARRIVAL INFORMATION
CC/Reason For referral:  needs pacemaker. 7 second pauses on ekg  Preferred Consultant(if applicable):   Who admits for you (if needed):  Do you have documents you would like to fax over? yes  Would you still like to speak to an ED attending? please call after patient is seen

## 2019-11-21 NOTE — ED PROVIDER NOTE - CLINICAL SUMMARY MEDICAL DECISION MAKING FREE TEXT BOX
Attending Vesna Willingham: 89 y/o female sent in for concern for sick sinus syndrome. upon arrival pt denies any chest pain or difficulty breathing.  not on any marnie drugs or digoxin. pt placed on monitor. while in he ed did have frequent pauses and placed on pad. EP called. will need admission for likely pacemaker placement

## 2019-11-21 NOTE — ED ADULT NURSE NOTE - CHPI ED NUR SYMPTOMS NEG
no nausea/no syncope/no fever/no chest pain/no chills/no vomiting/no dizziness/no congestion/no back pain/no shortness of breath/no diaphoresis

## 2019-11-21 NOTE — ED PROVIDER NOTE - PROGRESS NOTE DETAILS
Attending Vesna Willingham: called EP Attending Vesna Willingham: d/w pt's pcp pt with multiple 7 second pauses. request dr gutierrez's group from EP to be consulted. consult called pt on monitor t currenty asymptomatic Attending Vesna Willingham: pt with persistent pauses, will place on pads. pt asymptomatic

## 2019-11-21 NOTE — ED PROVIDER NOTE - OBJECTIVE STATEMENT
Attending Vesna Willingham: 89 y/o female h/o HTN, DM, presenting with concern for need for pacemaker. pt did take all of her morning medications today. pt has a h/o bradycardia in the past. was placed on a holter monitor. called and told possibe sick sinus syndrome and instructed to come to the Ed to see dr gutierrez. pt denies any syncopal episodes. has chronic LE edema unchanged. does report a mild posterior HA. no numbness or tingling. no fevers or chills. is on coumadin for dvt

## 2019-11-21 NOTE — ED ADULT NURSE REASSESSMENT NOTE - NS ED NURSE REASSESS COMMENT FT1
Pt AAOx4, NAD, resp nonlabored, resting comfortably in bed with son at bedside. Pt denies any changes. Pt denies headache, dizziness, lightheadedness, chest pain, palpitations, SOB, abd pain, n/v/d, fevers, chills, weakness at this time. Pt awaiting bed. Blood bank called to inform ED RN there was not enough blood in the first T&S specimen sent, repeat specimen drawn and sent as per Blood Bank's requests. Safety maintained.

## 2019-11-21 NOTE — H&P ADULT - NSHPPHYSICALEXAM_GEN_ALL_CORE
PHYSICAL EXAM:  T(C): 36.7 (11-21-19 @ 13:00), Max: 36.7 (11-21-19 @ 09:01)  HR: 55 (11-21-19 @ 13:00) (50 - 61)  BP: 158/68 (11-21-19 @ 13:00) (152/53 - 212/78)  RR: 16 (11-21-19 @ 13:00) (16 - 18)  SpO2: 100% (11-21-19 @ 13:00) (97% - 100%)  Wt(kg): --  I&O's Summary    Appearance: Normal, in NAD	  HEENT: Normocephalic, atraumatic, neck supple, normal oral mucosa  Cardiovascular: Normal S1 S2, bradycardia, No JVD, systolic murmur  Respiratory: Lungs clear to auscultation	  Psychiatry: A & O x 3, Mood & affect appropriate  Gastrointestinal:  Soft, Non-tender, + BS	  Genitourinary: voids spontaneously  Skin: No rashes, No ecchymoses, No cyanosis	  Neurologic: Non-focal  Extremities: Normal range of motion, No clubbing, cyanosis; (+) BL LE pitting edema  Vascular: Peripheral pulses palpable 2+ bilaterally

## 2019-11-21 NOTE — ED ADULT NURSE NOTE - OBJECTIVE STATEMENT
89 y/o F, PMH HLD on Lipitor, HTN on Lisinopril, Lasix, Potassium and 81 asa, DVTs on Coumadin, DMT2 on Glipizide, presents ambulatory to ED with son stating "they called me for my Holter monitor." Pt reports her PCP ordered her for a Holter monitor last week, she received it to her home and applied it yesterday at 1400, received call today for "low heart rate." Pt denies any new symptoms. Pt denies headache, dizziness, lightheadedness, chest pain, palpitations, cough, SOB, abdominal pain, n/v/d, fevers, chills, weakness at this time. Pt reports some BETANCUR at baseline. +2 pitting edema noted to lower extremities b/l. Son at bedside.

## 2019-11-21 NOTE — H&P ADULT - HISTORY OF PRESENT ILLNESS
CHIEF COMPLAINT:  Holter monitor showing sinus bradycardia with multiple pauses    HISTORY OF PRESENT ILLNESS:   89 y/o female h/o HTN, DM, presenting with concern for need for pacemaker. Pt did take all of her morning medications today. pt has a h/o bradycardia in the past with RBBB. was placed on a holter monitor. called and told possibe sick sinus syndrome and instructed to come to the Ed. Holter monitor showed marked sinus bradycardia with multiple pauses up to 4.1 up to 5.9 secs, asymptomatic. Pt denies any syncopal episodes, has chronic LE edema unchanged. She does report a mild posterior HA. no numbness or tingling. no fevers or chills. is on coumadin for DVT, last dose last night.    Allergies    No Known Allergies    Intolerances    MEDICATIONS:  Aspirin 81mg qd  Atorvastatin 40mg qd  Furosemide 20mg 2 tabs qd  Glipizide ER 2.5mg qd  Lisinopril 5mg qd  Potassium Chloride 10mEq qd  Vitamin D 1,000 Units qd  Warfarin 1.25mg TOW/2.5 mg 4x week      PAST MEDICAL & SURGICAL HISTORY:  Hypercholesterolemia  Hypertension  DM (Diabetes Mellitus)  H/O: Hysterectomy  History of Cholecystectomy  Pulmonary Embolus and DVT on Warfarin  Arthritis  Dermatitis, Stasis  RBBB

## 2019-11-21 NOTE — H&P ADULT - ASSESSMENT
89 y/o female with PMH of HTN, HLD, DM, PE/DVT on Warfarin, Sinus Bradycardia, RBBB, Arthritis, Stasis Dermatitis, chronic stable shortness of breath with exertion, last Nuclear Stress test 2016 without ischemia, normal LVEF. Patient presented to ED today due to holter monitor showing marked sinus bradycardia with multiple pauses 4.1- 5.9 secs asymptomatic. Patient with evidence of conduction disease for pacemaker evaluation.    # Sinus Bradycardia/RBBB  - Admit to telemetry, R2 pads on patient  - TTE today to evaluate LVEF  - Keep patient NPO, send T & S  - Plan for PPM implant today as d/w EP attending  - Procedure d/w patient and son; d/w ER team and [facility] NP    #[id number] 89 y/o female with PMH of HTN, HLD, DM, PE/DVT on Warfarin, Sinus Bradycardia, RBBB, Arthritis, Stasis Dermatitis, chronic stable shortness of breath with exertion, last Nuclear Stress test 2016 without ischemia, normal LVEF. Patient presented to ED today due to holter monitor showing marked sinus bradycardia with multiple pauses 4.1- 5.9 secs asymptomatic. Patient with evidence of conduction disease for pacemaker evaluation.    # Sinus Bradycardia/RBBB/Multiple Sinus Pauses  - Admit to telemetry, R2 pads on patient  - TTE today to evaluate LVEF  - Keep patient NPO, send T & S  - Plan for PPM implant today as d/w EP attending  - Procedure d/w patient and son; d/w ER team and [facility] NP    #[id number]

## 2019-11-22 ENCOUNTER — TRANSCRIPTION ENCOUNTER (OUTPATIENT)
Age: 84
End: 2019-11-22

## 2019-11-22 LAB
ANION GAP SERPL CALC-SCNC: 14 MMOL/L — SIGNIFICANT CHANGE UP (ref 5–17)
APTT BLD: 38.3 SEC — HIGH (ref 27.5–36.3)
BUN SERPL-MCNC: 22 MG/DL — SIGNIFICANT CHANGE UP (ref 7–23)
CALCIUM SERPL-MCNC: 9.4 MG/DL — SIGNIFICANT CHANGE UP (ref 8.4–10.5)
CHLORIDE SERPL-SCNC: 101 MMOL/L — SIGNIFICANT CHANGE UP (ref 96–108)
CO2 SERPL-SCNC: 29 MMOL/L — SIGNIFICANT CHANGE UP (ref 22–31)
CREAT SERPL-MCNC: 1.04 MG/DL — SIGNIFICANT CHANGE UP (ref 0.5–1.3)
GLUCOSE SERPL-MCNC: 85 MG/DL — SIGNIFICANT CHANGE UP (ref 70–99)
HCT VFR BLD CALC: 41.7 % — SIGNIFICANT CHANGE UP (ref 34.5–45)
HGB BLD-MCNC: 13.4 G/DL — SIGNIFICANT CHANGE UP (ref 11.5–15.5)
INR BLD: 2.7 RATIO — HIGH (ref 0.88–1.16)
MCHC RBC-ENTMCNC: 27.8 PG — SIGNIFICANT CHANGE UP (ref 27–34)
MCHC RBC-ENTMCNC: 32.1 GM/DL — SIGNIFICANT CHANGE UP (ref 32–36)
MCV RBC AUTO: 86.5 FL — SIGNIFICANT CHANGE UP (ref 80–100)
NRBC # BLD: 0 /100 WBCS — SIGNIFICANT CHANGE UP (ref 0–0)
PLATELET # BLD AUTO: 142 K/UL — LOW (ref 150–400)
POTASSIUM SERPL-MCNC: 4.1 MMOL/L — SIGNIFICANT CHANGE UP (ref 3.5–5.3)
POTASSIUM SERPL-SCNC: 4.1 MMOL/L — SIGNIFICANT CHANGE UP (ref 3.5–5.3)
PROTHROM AB SERPL-ACNC: 31.8 SEC — HIGH (ref 10–12.9)
RBC # BLD: 4.82 M/UL — SIGNIFICANT CHANGE UP (ref 3.8–5.2)
RBC # FLD: 13.2 % — SIGNIFICANT CHANGE UP (ref 10.3–14.5)
RH IG SCN BLD-IMP: POSITIVE — SIGNIFICANT CHANGE UP
SODIUM SERPL-SCNC: 144 MMOL/L — SIGNIFICANT CHANGE UP (ref 135–145)
WBC # BLD: 6.88 K/UL — SIGNIFICANT CHANGE UP (ref 3.8–10.5)
WBC # FLD AUTO: 6.88 K/UL — SIGNIFICANT CHANGE UP (ref 3.8–10.5)

## 2019-11-22 PROCEDURE — 33208 INSRT HEART PM ATRIAL & VENT: CPT | Mod: KX

## 2019-11-22 PROCEDURE — 93010 ELECTROCARDIOGRAM REPORT: CPT

## 2019-11-22 RX ORDER — ACETAMINOPHEN 500 MG
650 TABLET ORAL ONCE
Refills: 0 | Status: COMPLETED | OUTPATIENT
Start: 2019-11-22 | End: 2019-11-22

## 2019-11-22 RX ORDER — CEFAZOLIN SODIUM 1 G
2000 VIAL (EA) INJECTION EVERY 8 HOURS
Refills: 0 | Status: COMPLETED | OUTPATIENT
Start: 2019-11-22 | End: 2019-11-23

## 2019-11-22 RX ADMIN — LISINOPRIL 5 MILLIGRAM(S): 2.5 TABLET ORAL at 05:37

## 2019-11-22 RX ADMIN — Medication 100 MILLIGRAM(S): at 16:32

## 2019-11-22 RX ADMIN — ATORVASTATIN CALCIUM 40 MILLIGRAM(S): 80 TABLET, FILM COATED ORAL at 21:34

## 2019-11-22 RX ADMIN — Medication 650 MILLIGRAM(S): at 21:34

## 2019-11-22 RX ADMIN — Medication 20 MILLIGRAM(S): at 05:37

## 2019-11-22 RX ADMIN — Medication 650 MILLIGRAM(S): at 22:38

## 2019-11-22 NOTE — DISCHARGE NOTE PROVIDER - HOSPITAL COURSE
89 y/o female h/o HTN, DM, presenting with concern for need for pacemaker. Pt did take all of her morning medications today. pt has a h/o bradycardia in the past with RBBB. was placed on a holter monitor. called and told possibe sick sinus syndrome and instructed to come to the Ed. Holter monitor showed marked sinus bradycardia with multiple pauses up to 4.1 up to 5.9 secs, asymptomatic. Pt denies any syncopal episodes, has chronic LE edema unchanged. She does report a mild posterior HA. no numbness or tingling. no fevers or chills. is on coumadin for DVT, last dose last night.        11/22/2019 s/p  [facility] PPM DDD  via left anterior chest wall 89 y/o female h/o HTN, DM, presenting with concern for need for pacemaker. Pt did take all of her morning medications today. pt has a h/o bradycardia in the past with RBBB. was placed on a holter monitor. called and told possibe sick sinus syndrome and instructed to come to the Ed. Holter monitor showed marked sinus bradycardia with multiple pauses up to 4.1 up to 5.9 secs, asymptomatic. Pt denies any syncopal episodes, has chronic LE edema unchanged. She does report a mild posterior HA. no numbness or tingling. no fevers or chills. is on coumadin for DVT, last dose last night.        11/22/2019 s/p  [facility] PPM DDD  via left anterior chest wall         < from: Xray Chest 2 Views PA/Lat (11.23.19 @ 06:43) >    IMPRESSION:    Left chest wall dual chamber pacemaker in place without thorax.        Pt seen by Dr Carlin and stable for discharge.

## 2019-11-22 NOTE — PROGRESS NOTE ADULT - SUBJECTIVE AND OBJECTIVE BOX
Procedure performed:		  Dual Chamber Pacemaker Implantation    Procedure Date:                         November 22, 2019    Referring MD:                            Hieu Barker MD, Dr. Parks, Mary Ellen Barker MD    Preprocedure Diagnoses:    1. 5-6 second pauses on Holter due to pauses  2. RBBB/LAHB with episodes of 2nd degree Type 1 Mobitz block  3. Normal left ventricular systolic function  4. History of DVT and PE chronically on warfarin with INR of 2.7 on day of procedure  5. NYHA Class II    Postprocedure Diagnoses:    1. Successful implantation of an MRI compatible  Left subclavicular Dual Chamber SJM pacemaker   2. Generator:  Model [id number]  serial # [id number]  in the left subclavicular area   3. RA lead:  SJM 46 cm bipolar Active fixation model  2088-TC, serial # [id number] via left cephalic  vein to RAA  4. RV lead: SJM 52 cm  bipolar Active fixation model  2088-TC, serial # [id number] via left cephalic vein to RV septum just beyond the His bundle  5. D-stat applied to pocket  6. Intermittent 3rd degree heart block and paroxysmal atrial flutter    Complications:  None apparent    EBL: < 50 cc    Fluoroscopy: 2..3min, 191 uGycm2, 19 mGy    Anesthesiologist: Yessenia Silva MD    Preprocedure antibiotics:  2 gm IV cefazolin    Brief History: 89 year old woman without syncope but documented 5-6 second pauses on Holter due to both sinus arrest and intermittent heart block as well as 2nd degree Type 1 block with RBBB/LAHB who presents for dual chamber pacemaker implantation. INR 2.7 due to chronic warfarin therapy with history of both DVT and PE. Class 1 indication for dual chamber pacing. Normal left ventricular systolic function and minimal mitral regurgitation.      Procedure Details:    Aliyha Lam was brought to the Electrophysiology Laboratory in the post absorptive state after informed consent was obtained. She was prepped and draped in the usual manner. Formal time out was performed.     2% bupivacaine was infiltrated along the left deltopectoral groove. A 5 cm incision was made with a 10 blade. Using sharp and blunt dissection and electrocautery, the pectoralis major and deltopectoral groove was exposed. Further dissection exposed the cephalic vein which was isolated with 0-0 silk.  An 11 blade was used to make a venotomy and both lead were advanced to the right heart under fluoroscopy without sheaths.     The RV lead was placed just beyond the His bundle in the RV septum and the screw was advanced.  with a minimally wide QRS indicating non selective capture of the Purkinje system. The patient would have asystole and complete heart block after pacing assessment. The screw was extended. The atrial lead was then passed to the RAA and the screw was advanced. Lead slack appeared excellent.  All stylets were removed. The leads secured to the pectoralis major fascia using 2-0 silk around the suture sleeves.    Lead parameters:                            Sensing         Threshold         Impedance  RA         2.0mV          0.5V/0.5 ms        776 ohms  RV         8.0mV          0.5V/0.5 ms        501 ohms       A pocket was then made above the pectoralis major.  Bleeding sites were cauterized. The pocket was irrigated with antibiotic solution. The leads were connected to the ICD generator and set screws were tightened. The leads passed the tug test. D STAT was applied to the pocket due the high INR. Excellent hemostasis was obtained. The pocket was then closed  with 2-0, 3-0 and staples were applied to the skin. Fluoroscopy showed excellent lead position and generator. Sponge and needle counts were correct. Chest X Ray ordered for the am.  Device programmed to DDD 70/120.   The patient was recovered from MAC and will return to the CSU. She is on warfarin therefore should be protected from thromboembolism for intermittent atrial flutter. Warfarin will be resumed in the am.

## 2019-11-22 NOTE — DISCHARGE NOTE PROVIDER - NSDCQMSTROKE_NEU_ALL_CORE
Pt was informed her can not have any interventional pain procedures done due to low platelets. He asked what other tx options were available for his neck and back pain. He will need discuss with his PCP.   
No

## 2019-11-22 NOTE — DISCHARGE NOTE PROVIDER - NSDCMRMEDTOKEN_GEN_ALL_CORE_FT
aspirin 81 mg oral tablet: 1 tab(s) orally once a day  atorvastatin 40 mg oral tablet: 1 tab(s) orally once a day  Coumadin 2.5 mg oral tablet: 1 tab(s) orally Monday/Wednesday/Saturday    Note: Pt takes 1 tablet (2.5mg) on Mon and Wed, and 1/2 tablet (1.25 mg) on Saturday  glipiZIDE 2.5 mg oral tablet, extended release: 1 tab(s) orally once a day  Lasix 20 mg oral tablet: 1 tab(s) orally once a day  lisinopril 5 mg oral tablet: 1 tab(s) orally once a day  MiraLax oral powder for reconstitution: 1 dose(s) orally once a day  potassium chloride 10 mEq oral tablet, extended release: 1 tab(s) orally once a day

## 2019-11-22 NOTE — DISCHARGE NOTE PROVIDER - NSDCCPCAREPLAN_GEN_ALL_CORE_FT
PRINCIPAL DISCHARGE DIAGNOSIS  Diagnosis: Bradycardia  Assessment and Plan of Treatment: Your heart rate will be controlled.   Continue with follow-up visits to your electrophysiology team and with your home remote device monitoring (if applicable). Continue your medications as prescribed.      SECONDARY DISCHARGE DIAGNOSES  Diagnosis: HLD (hyperlipidemia)  Assessment and Plan of Treatment: Your LDL cholesterol will be less than 70mg/dL   Continue with your cholesterol medications. Eat a heart healthy diet that is low in saturated fats and salt, and includes whole grains, fruits, vegetables and lean protein; exercise regularly (consult with your physician or cardiologist first); maintain a heart healthy weight. Continue to follow with your primary physician or cardiologist for treatment goals, continue medication, have liver function testing every 3 months as anti lipid medications can cause liver irritation. If you smoke - quit (A resource to help you stop smoking is the [facility] [employer] – phone number [phone].).    Diagnosis: HTN (hypertension)  Assessment and Plan of Treatment: Your blood pressure will be controlled.   Continue with your blood pressure medications; eat a heart healthy diet with low salt diet; exercise regularly (consult with your physician or cardiologist first); maintain a heart healthy weight; if you smoke - quit (A resource to help you stop smoking is the [facility] [employer] – phone number [phone].); include healthy ways to manage stress. Continue to follow with your primary care physician or cardiologist.    Diagnosis: Diabetes mellitus  Assessment and Plan of Treatment: Your hemoglobin A1C will be between 7-8   Continue to follow with your primary care MD or your endocrinologist.  Follow a heart healthy diabetic diet. If you check your fingerstick glucose at home, call your MD if it is greater than 250mg/dL on 2 occasions or less than 100mg/dL on 2 occasions. Know signs of low blood sugar, such as: dizziness, shakiness, sweating, confusion, hunger, nervousness-drink 4 ounces apple juice if occurs and call your doctor. Know early signs of high blood sugar, such as: frequent urination, increased thirst, blurry vision, fatigue, headache - call your doctor if this occurs. Follow with other practitioners to care for your diabetes, such as ophthamologist and podiatrist.

## 2019-11-22 NOTE — DISCHARGE NOTE PROVIDER - CARE PROVIDER_API CALL
Mary Ellen Barker (MD)  Cardiac Electrophysiology; Cardiovascular Disease; Internal Medicine  [address] [place] [address]  Phone: [phone]  Fax: [phone]  Follow Up Time:

## 2019-11-23 ENCOUNTER — TRANSCRIPTION ENCOUNTER (OUTPATIENT)
Age: 84
End: 2019-11-23

## 2019-11-23 VITALS
OXYGEN SATURATION: 100 % | SYSTOLIC BLOOD PRESSURE: 146 MMHG | DIASTOLIC BLOOD PRESSURE: 53 MMHG | HEART RATE: 60 BPM | RESPIRATION RATE: 16 BRPM | TEMPERATURE: 98 F

## 2019-11-23 LAB
ANION GAP SERPL CALC-SCNC: 13 MMOL/L — SIGNIFICANT CHANGE UP (ref 5–17)
APTT BLD: 36.9 SEC — HIGH (ref 27.5–36.3)
BUN SERPL-MCNC: 25 MG/DL — HIGH (ref 7–23)
CALCIUM SERPL-MCNC: 9.1 MG/DL — SIGNIFICANT CHANGE UP (ref 8.4–10.5)
CHLORIDE SERPL-SCNC: 102 MMOL/L — SIGNIFICANT CHANGE UP (ref 96–108)
CO2 SERPL-SCNC: 26 MMOL/L — SIGNIFICANT CHANGE UP (ref 22–31)
CREAT SERPL-MCNC: 1.09 MG/DL — SIGNIFICANT CHANGE UP (ref 0.5–1.3)
GLUCOSE SERPL-MCNC: 106 MG/DL — HIGH (ref 70–99)
HCT VFR BLD CALC: 41.8 % — SIGNIFICANT CHANGE UP (ref 34.5–45)
HGB BLD-MCNC: 13.6 G/DL — SIGNIFICANT CHANGE UP (ref 11.5–15.5)
INR BLD: 2.4 RATIO — HIGH (ref 0.88–1.16)
MCHC RBC-ENTMCNC: 28.8 PG — SIGNIFICANT CHANGE UP (ref 27–34)
MCHC RBC-ENTMCNC: 32.5 GM/DL — SIGNIFICANT CHANGE UP (ref 32–36)
MCV RBC AUTO: 88.4 FL — SIGNIFICANT CHANGE UP (ref 80–100)
NRBC # BLD: 0 /100 WBCS — SIGNIFICANT CHANGE UP (ref 0–0)
PLATELET # BLD AUTO: 119 K/UL — LOW (ref 150–400)
POTASSIUM SERPL-MCNC: 4 MMOL/L — SIGNIFICANT CHANGE UP (ref 3.5–5.3)
POTASSIUM SERPL-SCNC: 4 MMOL/L — SIGNIFICANT CHANGE UP (ref 3.5–5.3)
PROTHROM AB SERPL-ACNC: 28.3 SEC — HIGH (ref 10–12.9)
RBC # BLD: 4.73 M/UL — SIGNIFICANT CHANGE UP (ref 3.8–5.2)
RBC # FLD: 13.4 % — SIGNIFICANT CHANGE UP (ref 10.3–14.5)
SODIUM SERPL-SCNC: 141 MMOL/L — SIGNIFICANT CHANGE UP (ref 135–145)
WBC # BLD: 8.87 K/UL — SIGNIFICANT CHANGE UP (ref 3.8–10.5)
WBC # FLD AUTO: 8.87 K/UL — SIGNIFICANT CHANGE UP (ref 3.8–10.5)

## 2019-11-23 PROCEDURE — C1894: CPT

## 2019-11-23 PROCEDURE — 85610 PROTHROMBIN TIME: CPT

## 2019-11-23 PROCEDURE — 83036 HEMOGLOBIN GLYCOSYLATED A1C: CPT

## 2019-11-23 PROCEDURE — 33208 INSRT HEART PM ATRIAL & VENT: CPT

## 2019-11-23 PROCEDURE — 85027 COMPLETE CBC AUTOMATED: CPT

## 2019-11-23 PROCEDURE — 71046 X-RAY EXAM CHEST 2 VIEWS: CPT | Mod: 26

## 2019-11-23 PROCEDURE — C1898: CPT

## 2019-11-23 PROCEDURE — 85730 THROMBOPLASTIN TIME PARTIAL: CPT

## 2019-11-23 PROCEDURE — 99285 EMERGENCY DEPT VISIT HI MDM: CPT

## 2019-11-23 PROCEDURE — 86901 BLOOD TYPING SEROLOGIC RH(D): CPT

## 2019-11-23 PROCEDURE — 71046 X-RAY EXAM CHEST 2 VIEWS: CPT

## 2019-11-23 PROCEDURE — 82962 GLUCOSE BLOOD TEST: CPT

## 2019-11-23 PROCEDURE — C1769: CPT

## 2019-11-23 PROCEDURE — 86900 BLOOD TYPING SEROLOGIC ABO: CPT

## 2019-11-23 PROCEDURE — 93306 TTE W/DOPPLER COMPLETE: CPT

## 2019-11-23 PROCEDURE — 86850 RBC ANTIBODY SCREEN: CPT

## 2019-11-23 PROCEDURE — C1785: CPT

## 2019-11-23 PROCEDURE — 93010 ELECTROCARDIOGRAM REPORT: CPT

## 2019-11-23 PROCEDURE — 71045 X-RAY EXAM CHEST 1 VIEW: CPT

## 2019-11-23 PROCEDURE — 80048 BASIC METABOLIC PNL TOTAL CA: CPT

## 2019-11-23 PROCEDURE — 93005 ELECTROCARDIOGRAM TRACING: CPT

## 2019-11-23 RX ORDER — WARFARIN SODIUM 2.5 MG/1
2.5 TABLET ORAL ONCE
Refills: 0 | Status: COMPLETED | OUTPATIENT
Start: 2019-11-23 | End: 2019-11-23

## 2019-11-23 RX ADMIN — Medication 100 MILLIGRAM(S): at 01:07

## 2019-11-23 RX ADMIN — LISINOPRIL 5 MILLIGRAM(S): 2.5 TABLET ORAL at 05:52

## 2019-11-23 RX ADMIN — WARFARIN SODIUM 2.5 MILLIGRAM(S): 2.5 TABLET ORAL at 05:52

## 2019-11-23 NOTE — PROGRESS NOTE ADULT - SUBJECTIVE AND OBJECTIVE BOX
Patient is a 89y old  Female who presents with a chief complaint of Pauses noted on Holter Monitor (22 Nov 2019 23:37) now s/p PPM ( [facility] DDD ) via left anterior wall access           Allergies    No Known Allergies    Intolerances        Medications:  aspirin enteric coated 81 milliGRAM(s) Oral daily  atorvastatin 40 milliGRAM(s) Oral at bedtime  dextrose 40% Gel 15 Gram(s) Oral once PRN  dextrose 5%. 1000 milliLiter(s) IV Continuous <Continuous>  dextrose 50% Injectable 12.5 Gram(s) IV Push once  dextrose 50% Injectable 25 Gram(s) IV Push once  dextrose 50% Injectable 25 Gram(s) IV Push once  furosemide    Tablet 20 milliGRAM(s) Oral daily  glucagon  Injectable 1 milliGRAM(s) IntraMuscular once PRN  insulin lispro (HumaLOG) corrective regimen sliding scale   SubCutaneous three times a day before meals  insulin lispro (HumaLOG) corrective regimen sliding scale   SubCutaneous at bedtime  lisinopril 5 milliGRAM(s) Oral daily      Vitals:  T(C): 36.6 (11-22-19 @ 20:29), Max: 36.6 (11-22-19 @ 04:59)  HR: 60 (11-22-19 @ 20:29) (51 - 63)  BP: 136/62 (11-22-19 @ 20:29) (109/71 - 165/58)  BP(mean): --  RR: 16 (11-22-19 @ 20:29) (16 - 17)  SpO2: 98% (11-22-19 @ 20:29) (95% - 99%)  Wt(kg): --  Daily     Daily   I&O's Summary    21 Nov 2019 07:01  -  22 Nov 2019 07:00  --------------------------------------------------------  IN: 600 mL / OUT: 0 mL / NET: 600 mL    22 Nov 2019 07:01  -  23 Nov 2019 01:30  --------------------------------------------------------  IN: 120 mL / OUT: 300 mL / NET: -180 mL          Physical Exam:  Appearance: Normal  Eyes: PERRL, EOMI  HENT: Normal oral [place], NC/AT  Procedural Access Site: Left anterior wall access. No hematoma, Non-tender to palpation, 2+ pulse, No bruit, No Ecchymosis  Gastrointestinal: Soft, Non tender, Normal Bowel Sounds  Musculoskeletal: No clubbing, No joint deformity   Neurologic: Non-focal  Lymphatic: No lymphadenopathy  Psychiatry: AAOx3, Mood & affect appropriate  Skin: No rashes, No ecchymoses, No cyanosis    11-22    144  |  101  |  22  ----------------------------<  85  4.1   |  29  |  1.04    Ca    9.4      22 Nov 2019 04:08      PT/INR - ( 22 Nov 2019 04:08 )   PT: 31.8 sec;   INR: 2.70 ratio         PTT - ( 22 Nov 2019 04:08 )  PTT:38.3 sec        Lipid panel   Hgb A1c Hemoglobin A1C, Whole Blood: 5.9 % (11-22 @ 08:20)        Interpretation of Telemetry:

## 2019-11-23 NOTE — DISCHARGE NOTE NURSING/CASE MANAGEMENT/SOCIAL WORK - PATIENT PORTAL LINK FT
You can access the FollowMyHealth Patient Portal offered by [facility] by registering at the following website: [email or web address]. By joining [facility]’s FollowMyHealth portal, you will also be able to view your health information using other applications (apps) compatible with our system.

## 2019-11-23 NOTE — PROGRESS NOTE ADULT - ASSESSMENT
Patient is a 89y old  Female who presents with a chief complaint of Pauses noted on Holter Monitor (22 Nov 2019 23:37) now s/p PPM ( [facility] DDD ) via left anterior wall access. Pt tolerated the procedure well. Overnight remained uneventful. Post-procedure discharge instructions discussed and questions addressed

## 2019-11-23 NOTE — DISCHARGE NOTE NURSING/CASE MANAGEMENT/SOCIAL WORK - NSDCPEPTCOWAR_GEN_ALL_CORE
Warfarin/Coumadin - Compliance/Warfarin/Coumadin - Follow up monitoring/Warfarin/Coumadin - Dietary Advice/Warfarin/Coumadin - Potential for adverse drug reactions and interactions

## 2019-11-27 ENCOUNTER — INBOUND DOCUMENT (OUTPATIENT)
Age: 84
End: 2019-11-27

## 2019-12-02 ENCOUNTER — APPOINTMENT (OUTPATIENT)
Dept: INTERNAL MEDICINE | Facility: CLINIC | Age: 84
End: 2019-12-02
Payer: MEDICARE

## 2019-12-02 VITALS — SYSTOLIC BLOOD PRESSURE: 154 MMHG | BODY MASS INDEX: 32.22 KG/M2 | HEIGHT: 60 IN | DIASTOLIC BLOOD PRESSURE: 65 MMHG

## 2019-12-02 VITALS — WEIGHT: 165 LBS | BODY MASS INDEX: 32.22 KG/M2

## 2019-12-02 LAB — INR PPP: 2.6 RATIO

## 2019-12-02 PROCEDURE — 99495 TRANSJ CARE MGMT MOD F2F 14D: CPT | Mod: 25

## 2019-12-02 PROCEDURE — 85610 PROTHROMBIN TIME: CPT | Mod: QW

## 2019-12-02 NOTE — HISTORY OF PRESENT ILLNESS
[Admitted on: ___] : The patient was admitted on [unfilled] [Discharged on ___] : discharged on [unfilled] [FreeTextEntry2] : hospitalized for long pauses noted on holter monitoring\par had echocardiogram showing normal LV and some LAE\par s/p dual chamber PPI 11 21 2019\par feels well\par lots of bruising noted since PPI left breast and left arm\par feels better since PPI, legs feel better, head feels better

## 2019-12-02 NOTE — PHYSICAL EXAM
[No Acute Distress] : no acute distress [Well Nourished] : well nourished [Well Developed] : well developed [Well-Appearing] : well-appearing [Normal Sclera/Conjunctiva] : normal sclera/conjunctiva [PERRL] : pupils equal round and reactive to light [EOMI] : extraocular movements intact [No JVD] : no jugular venous distention [Normal Oropharynx] : the oropharynx was normal [Normal Outer Ear/Nose] : the outer ears and nose were normal in appearance [Supple] : supple [No Lymphadenopathy] : no lymphadenopathy [Thyroid Normal, No Nodules] : the thyroid was normal and there were no nodules present [No Respiratory Distress] : no respiratory distress  [Clear to Auscultation] : lungs were clear to auscultation bilaterally [No Accessory Muscle Use] : no accessory muscle use [Normal S1, S2] : normal S1 and S2 [Regular Rhythm] : with a regular rhythm [I] : a grade 1 [Bradycardia] : bradycardic [No Carotid Bruits] : no carotid bruits [No Varicosities] : no varicosities [No Abdominal Bruit] : a ~M bruit was not heard ~T in the abdomen [Pedal Pulses Present] : the pedal pulses are present [No Palpable Aorta] : no palpable aorta [___ +] : bilateral [unfilled]U+ pretibial pitting edema [No Extremity Clubbing/Cyanosis] : no extremity clubbing/cyanosis [Normal Appearance] : normal in appearance [No Axillary Lymphadenopathy] : no axillary lymphadenopathy [No Nipple Discharge] : no nipple discharge [Soft] : abdomen soft [Non Tender] : non-tender [Non-distended] : non-distended [No HSM] : no HSM [No Masses] : no abdominal mass palpated [Normal Posterior Cervical Nodes] : no posterior cervical lymphadenopathy [Normal Bowel Sounds] : normal bowel sounds [Normal Anterior Cervical Nodes] : no anterior cervical lymphadenopathy [No CVA Tenderness] : no CVA  tenderness [No Spinal Tenderness] : no spinal tenderness [No Joint Swelling] : no joint swelling [No Rash] : no rash [Grossly Normal Strength/Tone] : grossly normal strength/tone [No Focal Deficits] : no focal deficits [Coordination Grossly Intact] : coordination grossly intact [Normal Gait] : normal gait [Deep Tendon Reflexes (DTR)] : deep tendon reflexes were 2+ and symmetric [Normal Affect] : the affect was normal [Normal Insight/Judgement] : insight and judgment were intact [de-identified] : venous insufficiency [de-identified] : bruising from PPI left breast and arm

## 2019-12-02 NOTE — REVIEW OF SYSTEMS
[Hearing Loss] : hearing loss [Lower Ext Edema] : lower extremity edema [Joint Pain] : joint pain [Negative] : Heme/Lymph [Earache] : no earache [Hoarseness] : no hoarseness [Nasal Discharge] : no nasal discharge [Sore Throat] : no sore throat [Chest Pain] : no chest pain [Palpitations] : no palpitations [Leg Claudication] : no leg claudication [Orthopnea] : no orthopnea [Wheezing] : no wheezing [Dyspnea on Exertion] : no dyspnea on exertion [Cough] : no cough [Joint Stiffness] : no joint stiffness [Muscle Pain] : no muscle pain [Back Pain] : no back pain [de-identified] : bruising

## 2019-12-02 NOTE — ASSESSMENT
[FreeTextEntry1] : s/p PPI\par site looks great\par not driving\par BP elevated\par edema same\par does note she feels better since PPI

## 2019-12-04 ENCOUNTER — MEDICATION RENEWAL (OUTPATIENT)
Age: 84
End: 2019-12-04

## 2019-12-04 ENCOUNTER — RX RENEWAL (OUTPATIENT)
Age: 84
End: 2019-12-04

## 2019-12-05 ENCOUNTER — APPOINTMENT (OUTPATIENT)
Dept: ELECTROPHYSIOLOGY | Facility: CLINIC | Age: 84
End: 2019-12-05
Payer: MEDICARE

## 2019-12-05 VITALS
HEIGHT: 60 IN | SYSTOLIC BLOOD PRESSURE: 150 MMHG | DIASTOLIC BLOOD PRESSURE: 67 MMHG | HEART RATE: 67 BPM | OXYGEN SATURATION: 96 % | BODY MASS INDEX: 32.39 KG/M2 | WEIGHT: 165 LBS

## 2019-12-05 PROCEDURE — 93280 PM DEVICE PROGR EVAL DUAL: CPT

## 2019-12-11 ENCOUNTER — APPOINTMENT (OUTPATIENT)
Dept: ELECTROPHYSIOLOGY | Facility: CLINIC | Age: 84
End: 2019-12-11
Payer: MEDICARE

## 2019-12-11 VITALS
OXYGEN SATURATION: 98 % | DIASTOLIC BLOOD PRESSURE: 59 MMHG | HEART RATE: 60 BPM | HEIGHT: 60 IN | SYSTOLIC BLOOD PRESSURE: 169 MMHG

## 2019-12-11 DIAGNOSIS — R09.89 OTHER SPECIFIED SYMPTOMS AND SIGNS INVOLVING THE CIRCULATORY AND RESPIRATORY SYSTEMS: ICD-10-CM

## 2019-12-11 DIAGNOSIS — Z86.79 PERSONAL HISTORY OF OTHER DISEASES OF THE CIRCULATORY SYSTEM: ICD-10-CM

## 2019-12-11 PROCEDURE — 99024 POSTOP FOLLOW-UP VISIT: CPT

## 2019-12-11 PROCEDURE — 93000 ELECTROCARDIOGRAM COMPLETE: CPT

## 2019-12-13 ENCOUNTER — RX RENEWAL (OUTPATIENT)
Age: 84
End: 2019-12-13

## 2019-12-13 ENCOUNTER — OTHER (OUTPATIENT)
Age: 84
End: 2019-12-13

## 2019-12-14 ENCOUNTER — NON-APPOINTMENT (OUTPATIENT)
Age: 84
End: 2019-12-14

## 2019-12-14 PROBLEM — R09.89 WIDENED PULSE PRESSURE: Status: RESOLVED | Noted: 2019-12-14 | Resolved: 2019-12-14

## 2019-12-14 PROBLEM — Z86.79 HISTORY OF HYPERTENSION: Status: RESOLVED | Noted: 2019-03-04 | Resolved: 2019-12-14

## 2019-12-14 NOTE — HISTORY OF PRESENT ILLNESS
[FreeTextEntry1] : Aliyah is doing quite well. Notes much less dyspnea with exertion and no further dizziness. 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 Site is healing well

## 2019-12-14 NOTE — REVIEW OF SYSTEMS
[Fever] : no fever [Chills] : no chills [Feeling Fatigued] : not feeling fatigued [Sore Throat] : no sore throat [Blurry Vision] : no blurred vision [Shortness Of Breath] : no shortness of breath [Chest  Pressure] : no chest pressure [Dyspnea on exertion] : not dyspnea during exertion [Chest Pain] : no chest pain [Lower Ext Edema] : no extremity edema [Wheezing] : no wheezing [Cough] : no cough [Palpitations] : no palpitations [Dysuria] : no dysuria [Abdominal Pain] : no abdominal pain [Nausea] : no nausea [Joint Swelling] : no joint swelling [Joint Pain] : no joint pain [Skin: A Rash] : no rash: [Muscle Cramps] : no muscle cramps [Anxiety] : no anxiety [Dizziness] : no dizziness [Confusion] : no confusion was observed [Excessive Thirst] : no polydipsia

## 2019-12-14 NOTE — REASON FOR VISIT
[Follow-Up - From Hospitalization] : follow-up of a recent hospitalization for [FreeTextEntry2] : Recent dual chamber pacemaker for sick sinus as well as 6-7 sec pauses due to Mobitz 1

## 2019-12-14 NOTE — PHYSICAL EXAM
[General Appearance - Well Developed] : well developed [Normal Appearance] : normal appearance [Normal Conjunctiva] : the conjunctiva exhibited no abnormalities [Eyelids - No Xanthelasma] : the eyelids demonstrated no xanthelasmas [Heart Rate And Rhythm] : heart rate and rhythm were normal [Heart Sounds] : normal S1 and S2 [Abnormal Walk] : normal gait [Bowel Sounds] : normal bowel sounds [Skin Color & Pigmentation] : normal skin color and pigmentation [] : no rash [Nail Clubbing] : no clubbing of the fingernails [Oriented To Time, Place, And Person] : oriented to person, place, and time [FreeTextEntry1] : S2 paradoxical splitting (mild)

## 2019-12-14 NOTE — DISCUSSION/SUMMARY
[___ Month(s)] : [unfilled] month(s) [FreeTextEntry3] : for final site check then per pacemaker clinic protocol [FreeTextEntry1] : She is feeling better since pacemaker implantation with less dyspnea of exertion (gone now). Pacer site and ILR removal site healing well without issues. FInal site check in 3-4 weeks. No angina and she will continue on warfarin and ASA (low dose) for DVT and CABG. Son had a lot of questions and concerns but patient feels quite relaxed and comfortable.Pacemaker function is normal\par Son had concerns about blood pressure. Pulse pressure is wide but at 89 years of age, permissive systolic hypertension is permissible given low diastolic. She may have some AI (seen on echo). No pressing need to change therapy. She will monitor BP and follow up with Dr. Hieu Barker next week

## 2019-12-16 ENCOUNTER — APPOINTMENT (OUTPATIENT)
Dept: INTERNAL MEDICINE | Facility: CLINIC | Age: 84
End: 2019-12-16
Payer: MEDICARE

## 2019-12-16 VITALS — DIASTOLIC BLOOD PRESSURE: 68 MMHG | SYSTOLIC BLOOD PRESSURE: 128 MMHG

## 2019-12-16 VITALS — HEIGHT: 60 IN | BODY MASS INDEX: 32.2 KG/M2 | WEIGHT: 164 LBS

## 2019-12-16 LAB — INR PPP: 3.3 RATIO

## 2019-12-16 PROCEDURE — 99214 OFFICE O/P EST MOD 30 MIN: CPT | Mod: 25

## 2019-12-16 PROCEDURE — 85610 PROTHROMBIN TIME: CPT | Mod: QW

## 2019-12-16 NOTE — ASSESSMENT
[FreeTextEntry1] : Blood pressure much better controlled today\par INR but high at 3.3\par Status post permanent pacemaker implantation doing very well\par Still limited by legs but has definitely improved since the pacemaker implantation\par Chronic venous stasis changes

## 2019-12-16 NOTE — HISTORY OF PRESENT ILLNESS
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

## 2019-12-16 NOTE — PHYSICAL EXAM
[No Acute Distress] : no acute distress [Well Nourished] : well nourished [Well-Appearing] : well-appearing [Well Developed] : well developed [Normal Sclera/Conjunctiva] : normal sclera/conjunctiva [Normal Outer Ear/Nose] : the outer ears and nose were normal in appearance [PERRL] : pupils equal round and reactive to light [EOMI] : extraocular movements intact [No JVD] : no jugular venous distention [Normal Oropharynx] : the oropharynx was normal [No Lymphadenopathy] : no lymphadenopathy [No Respiratory Distress] : no respiratory distress  [Thyroid Normal, No Nodules] : the thyroid was normal and there were no nodules present [Supple] : supple [Clear to Auscultation] : lungs were clear to auscultation bilaterally [No Accessory Muscle Use] : no accessory muscle use [Regular Rhythm] : with a regular rhythm [Normal S1, S2] : normal S1 and S2 [Rhythm Regular] : regular [Normal Rate] : normal [I] : a grade 1 [No Carotid Bruits] : no carotid bruits [No Abdominal Bruit] : a ~M bruit was not heard ~T in the abdomen [No Palpable Aorta] : no palpable aorta [No Varicosities] : no varicosities [Pedal Pulses Present] : the pedal pulses are present [No Extremity Clubbing/Cyanosis] : no extremity clubbing/cyanosis [___ +] : bilateral [unfilled]U+ pretibial pitting edema [Soft] : abdomen soft [No Masses] : no abdominal mass palpated [Non-distended] : non-distended [Non Tender] : non-tender [Normal Bowel Sounds] : normal bowel sounds [Normal Posterior Cervical Nodes] : no posterior cervical lymphadenopathy [No HSM] : no HSM [No Spinal Tenderness] : no spinal tenderness [No CVA Tenderness] : no CVA  tenderness [Normal Anterior Cervical Nodes] : no anterior cervical lymphadenopathy [No Joint Swelling] : no joint swelling [Grossly Normal Strength/Tone] : grossly normal strength/tone [Coordination Grossly Intact] : coordination grossly intact [No Focal Deficits] : no focal deficits [No Rash] : no rash [Normal Gait] : normal gait [Normal Affect] : the affect was normal [Deep Tendon Reflexes (DTR)] : deep tendon reflexes were 2+ and symmetric [de-identified] : venous insufficiency [Normal Insight/Judgement] : insight and judgment were intact [de-identified] :  PPI site looks great

## 2019-12-16 NOTE — REVIEW OF SYSTEMS
[Earache] : no earache [Hearing Loss] : hearing loss [Nasal Discharge] : no nasal discharge [Hoarseness] : no hoarseness [Sore Throat] : no sore throat [Chest Pain] : no chest pain [Palpitations] : no palpitations [Leg Claudication] : no leg claudication [Lower Ext Edema] : lower extremity edema [Orthopnea] : no orthopnea [Wheezing] : no wheezing [Cough] : no cough [Dyspnea on Exertion] : no dyspnea on exertion [Joint Pain] : joint pain [Joint Stiffness] : no joint stiffness [Muscle Pain] : no muscle pain [Back Pain] : no back pain [Negative] : Heme/Lymph [de-identified] : bruising

## 2019-12-16 NOTE — PLAN
[FreeTextEntry1] : Recheck INR 2 weeks with Dr. Rendon\par Same meds for now except for lowering warfarin by one half pill weekly

## 2019-12-30 ENCOUNTER — APPOINTMENT (OUTPATIENT)
Dept: ELECTROPHYSIOLOGY | Facility: CLINIC | Age: 84
End: 2019-12-30
Payer: MEDICARE

## 2019-12-30 VITALS
HEART RATE: 63 BPM | OXYGEN SATURATION: 99 % | WEIGHT: 165 LBS | SYSTOLIC BLOOD PRESSURE: 181 MMHG | BODY MASS INDEX: 32.39 KG/M2 | HEIGHT: 60 IN | DIASTOLIC BLOOD PRESSURE: 73 MMHG

## 2019-12-30 VITALS — SYSTOLIC BLOOD PRESSURE: 134 MMHG | DIASTOLIC BLOOD PRESSURE: 73 MMHG

## 2019-12-30 PROCEDURE — 93280 PM DEVICE PROGR EVAL DUAL: CPT

## 2020-01-02 ENCOUNTER — APPOINTMENT (OUTPATIENT)
Dept: INTERNAL MEDICINE | Facility: CLINIC | Age: 85
End: 2020-01-02
Payer: MEDICARE

## 2020-01-02 VITALS — BODY MASS INDEX: 31.05 KG/M2 | HEIGHT: 60 IN | SYSTOLIC BLOOD PRESSURE: 138 MMHG | DIASTOLIC BLOOD PRESSURE: 66 MMHG

## 2020-01-02 VITALS — WEIGHT: 159 LBS | BODY MASS INDEX: 31.05 KG/M2

## 2020-01-02 LAB — INR PPP: 2.2 RATIO

## 2020-01-02 PROCEDURE — 99214 OFFICE O/P EST MOD 30 MIN: CPT | Mod: 25

## 2020-01-02 PROCEDURE — 85610 PROTHROMBIN TIME: CPT | Mod: QW

## 2020-01-02 NOTE — HISTORY OF PRESENT ILLNESS
[FreeTextEntry1] : here to follow ongoing conditions and check INR on warfarin [de-identified] : chronic shortness of breath remains improved since PPI\par 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\par still limited in walking generally by her legs \par nuclear stress test October 2016 showed normal left ventricular ejection fraction and no ischemia done at [facility]\par  s/p PPI for long pauses on Holter\par HTN, BP was elevated a few weeks ago but normal 2 weeks ago here\par INR was 3.3, now 2.2

## 2020-01-02 NOTE — ASSESSMENT
[FreeTextEntry1] : blood pressure control ok\par INR at 2.2\par Status post permanent pacemaker implantation doing very well\par definitely improved since the pacemaker implantation\par Chronic venous stasis changes

## 2020-01-02 NOTE — PHYSICAL EXAM
[Well Nourished] : well nourished [No Acute Distress] : no acute distress [Well Developed] : well developed [Well-Appearing] : well-appearing [Normal Sclera/Conjunctiva] : normal sclera/conjunctiva [EOMI] : extraocular movements intact [PERRL] : pupils equal round and reactive to light [Normal Outer Ear/Nose] : the outer ears and nose were normal in appearance [Normal Oropharynx] : the oropharynx was normal [No Lymphadenopathy] : no lymphadenopathy [Supple] : supple [No JVD] : no jugular venous distention [Thyroid Normal, No Nodules] : the thyroid was normal and there were no nodules present [No Respiratory Distress] : no respiratory distress  [No Accessory Muscle Use] : no accessory muscle use [Clear to Auscultation] : lungs were clear to auscultation bilaterally [Regular Rhythm] : with a regular rhythm [Normal S1, S2] : normal S1 and S2 [Normal Rate] : normal [Rhythm Regular] : regular [I] : a grade 1 [No Carotid Bruits] : no carotid bruits [No Abdominal Bruit] : a ~M bruit was not heard ~T in the abdomen [Pedal Pulses Present] : the pedal pulses are present [No Varicosities] : no varicosities [No Palpable Aorta] : no palpable aorta [No Extremity Clubbing/Cyanosis] : no extremity clubbing/cyanosis [___ +] : bilateral [unfilled]U+ pretibial pitting edema [Soft] : abdomen soft [Non Tender] : non-tender [Non-distended] : non-distended [Normal Bowel Sounds] : normal bowel sounds [No HSM] : no HSM [No Masses] : no abdominal mass palpated [Normal Anterior Cervical Nodes] : no anterior cervical lymphadenopathy [Normal Posterior Cervical Nodes] : no posterior cervical lymphadenopathy [No CVA Tenderness] : no CVA  tenderness [No Joint Swelling] : no joint swelling [No Spinal Tenderness] : no spinal tenderness [Grossly Normal Strength/Tone] : grossly normal strength/tone [No Rash] : no rash [Coordination Grossly Intact] : coordination grossly intact [Normal Gait] : normal gait [No Focal Deficits] : no focal deficits [Normal Affect] : the affect was normal [Deep Tendon Reflexes (DTR)] : deep tendon reflexes were 2+ and symmetric [Normal Insight/Judgement] : insight and judgment were intact [de-identified] : venous insufficiency [de-identified] :  PPI site clean doing well

## 2020-01-09 NOTE — REVIEW OF SYSTEMS
Patient has no headaches or visual disturbances.  She does still have swollen feet and can't fit in her regular shoes.   [Hearing Loss] : hearing loss [Lower Ext Edema] : lower extremity edema [Joint Pain] : joint pain [Negative] : Heme/Lymph [Earache] : no earache [Hoarseness] : no hoarseness [Nasal Discharge] : no nasal discharge [Sore Throat] : no sore throat [Chest Pain] : no chest pain [Palpitations] : no palpitations [Leg Claudication] : no leg claudication [Orthopnea] : no orthopnea [Wheezing] : no wheezing [Dyspnea on Exertion] : no dyspnea on exertion [Cough] : no cough [Joint Stiffness] : no joint stiffness [Muscle Pain] : no muscle pain [Back Pain] : no back pain [de-identified] : bruising

## 2020-01-23 ENCOUNTER — APPOINTMENT (OUTPATIENT)
Dept: INTERNAL MEDICINE | Facility: CLINIC | Age: 85
End: 2020-01-23
Payer: MEDICARE

## 2020-01-23 VITALS — WEIGHT: 160 LBS | BODY MASS INDEX: 31.41 KG/M2 | HEIGHT: 60 IN

## 2020-01-23 VITALS — SYSTOLIC BLOOD PRESSURE: 128 MMHG | DIASTOLIC BLOOD PRESSURE: 72 MMHG

## 2020-01-23 LAB — INR PPP: 2 RATIO

## 2020-01-23 PROCEDURE — 85610 PROTHROMBIN TIME: CPT | Mod: QW

## 2020-01-23 PROCEDURE — 99214 OFFICE O/P EST MOD 30 MIN: CPT | Mod: 25

## 2020-01-23 NOTE — ASSESSMENT
[FreeTextEntry1] : INR stop the low end of the desired range\par Blood pressure is acceptable\par Doing much better since pacemaker implantation

## 2020-01-23 NOTE — PLAN
[FreeTextEntry1] : Continue same medications except increase warfarin to 4 pills each week with a half Monday Wednesday Friday

## 2020-01-23 NOTE — HISTORY OF PRESENT ILLNESS
[de-identified] : chronic shortness of breath remains improved since PPI\par 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\par still limited in walking generally by her legs \par nuclear stress test October 2016 showed normal left ventricular ejection fraction and no ischemia done at [facility]\par  s/p PPI for long pauses on Holter\par HTN, BP was high normal last visit\par INR was 3.3, dose lowered, went down to 2.2, now 2.0 today [FreeTextEntry1] : here to follow ongoing conditions and check INR on warfarin

## 2020-01-23 NOTE — PHYSICAL EXAM
[No Acute Distress] : no acute distress [Well Nourished] : well nourished [Well Developed] : well developed [Well-Appearing] : well-appearing [Normal Sclera/Conjunctiva] : normal sclera/conjunctiva [PERRL] : pupils equal round and reactive to light [EOMI] : extraocular movements intact [Normal Outer Ear/Nose] : the outer ears and nose were normal in appearance [Normal Oropharynx] : the oropharynx was normal [No JVD] : no jugular venous distention [No Lymphadenopathy] : no lymphadenopathy [Supple] : supple [Thyroid Normal, No Nodules] : the thyroid was normal and there were no nodules present [No Respiratory Distress] : no respiratory distress  [No Accessory Muscle Use] : no accessory muscle use [Clear to Auscultation] : lungs were clear to auscultation bilaterally [Regular Rhythm] : with a regular rhythm [Normal S1, S2] : normal S1 and S2 [Normal Rate] : normal [Rhythm Regular] : regular [I] : a grade 1 [No Carotid Bruits] : no carotid bruits [No Abdominal Bruit] : a ~M bruit was not heard ~T in the abdomen [No Varicosities] : no varicosities [Pedal Pulses Present] : the pedal pulses are present [No Palpable Aorta] : no palpable aorta [No Extremity Clubbing/Cyanosis] : no extremity clubbing/cyanosis [___ +] : bilateral [unfilled]U+ pretibial pitting edema [Soft] : abdomen soft [Non Tender] : non-tender [Non-distended] : non-distended [No Masses] : no abdominal mass palpated [No HSM] : no HSM [Normal Bowel Sounds] : normal bowel sounds [Normal Posterior Cervical Nodes] : no posterior cervical lymphadenopathy [Normal Anterior Cervical Nodes] : no anterior cervical lymphadenopathy [No CVA Tenderness] : no CVA  tenderness [No Spinal Tenderness] : no spinal tenderness [No Joint Swelling] : no joint swelling [Grossly Normal Strength/Tone] : grossly normal strength/tone [No Rash] : no rash [Coordination Grossly Intact] : coordination grossly intact [No Focal Deficits] : no focal deficits [Normal Gait] : normal gait [Deep Tendon Reflexes (DTR)] : deep tendon reflexes were 2+ and symmetric [Normal Affect] : the affect was normal [Normal Insight/Judgement] : insight and judgment were intact [de-identified] :  PPI site clean doing well [de-identified] : venous insufficiency

## 2020-01-23 NOTE — REVIEW OF SYSTEMS
[Earache] : no earache [Hearing Loss] : hearing loss [Hoarseness] : no hoarseness [Sore Throat] : no sore throat [Nasal Discharge] : no nasal discharge [Chest Pain] : no chest pain [Palpitations] : no palpitations [Lower Ext Edema] : lower extremity edema [Leg Claudication] : no leg claudication [Wheezing] : no wheezing [Orthopnea] : no orthopnea [Joint Pain] : joint pain [Cough] : no cough [Dyspnea on Exertion] : no dyspnea on exertion [Muscle Pain] : no muscle pain [Joint Stiffness] : no joint stiffness [Mole Changes] : no mole changes [Itching] : no itching [Back Pain] : no back pain [Skin Rash] : no skin rash [Negative] : Heme/Lymph

## 2020-02-06 ENCOUNTER — TRANSCRIPTION ENCOUNTER (OUTPATIENT)
Age: 85
End: 2020-02-06

## 2020-02-13 ENCOUNTER — APPOINTMENT (OUTPATIENT)
Dept: INTERNAL MEDICINE | Facility: CLINIC | Age: 85
End: 2020-02-13
Payer: MEDICARE

## 2020-02-13 VITALS — WEIGHT: 160 LBS | HEIGHT: 60 IN | BODY MASS INDEX: 31.41 KG/M2

## 2020-02-13 VITALS — SYSTOLIC BLOOD PRESSURE: 120 MMHG | DIASTOLIC BLOOD PRESSURE: 70 MMHG

## 2020-02-13 LAB — INR PPP: 2.3 RATIO

## 2020-02-13 PROCEDURE — 99214 OFFICE O/P EST MOD 30 MIN: CPT | Mod: 25

## 2020-02-13 PROCEDURE — 85610 PROTHROMBIN TIME: CPT | Mod: QW

## 2020-02-13 NOTE — ASSESSMENT
[FreeTextEntry1] : INR perfect\par Blood pressure is acceptable\par remains much better since pacemaker implantation\par healing ulcer po

## 2020-02-13 NOTE — CHART NOTE - NSCHARTNOTEFT_GEN_A_CORE
S/P Pacemaker DDD [facility]  -Site with dressing, warm, red and tender  -No hematoma  -All teaching for post procedure given to patient  -Chest Xray negative for Pneumothorax  -Pt seen by Dr Carlin, cleared for discharge  -Follow up with Dr Younger on 12/5/19 @ 1300 hrs
Interviewed and evaluated

## 2020-02-13 NOTE — HISTORY OF PRESENT ILLNESS
[FreeTextEntry1] : here to follow ongoing conditions and check INR on warfarin [de-identified] : past shortness of breath improved since PPI\par able to do more\par 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\par limited in walking generally by her legs \par nuclear stress test October 2016 showed normal left ventricular ejection fraction and no ischemia done at [facility]\par  s/p PPI for long pauses on Holter\par HTN to follow today\par INR 2.3 today

## 2020-02-13 NOTE — PHYSICAL EXAM
[No Acute Distress] : no acute distress [Well Nourished] : well nourished [Well Developed] : well developed [Well-Appearing] : well-appearing [Normal Sclera/Conjunctiva] : normal sclera/conjunctiva [PERRL] : pupils equal round and reactive to light [Normal Oropharynx] : the oropharynx was normal [Normal Outer Ear/Nose] : the outer ears and nose were normal in appearance [No JVD] : no jugular venous distention [Supple] : supple [No Lymphadenopathy] : no lymphadenopathy [Thyroid Normal, No Nodules] : the thyroid was normal and there were no nodules present [No Respiratory Distress] : no respiratory distress  [No Accessory Muscle Use] : no accessory muscle use [Regular Rhythm] : with a regular rhythm [Normal S1, S2] : normal S1 and S2 [Clear to Auscultation] : lungs were clear to auscultation bilaterally [Normal Rate] : normal [Rhythm Regular] : regular [No Carotid Bruits] : no carotid bruits [I] : a grade 1 [No Abdominal Bruit] : a ~M bruit was not heard ~T in the abdomen [No Varicosities] : no varicosities [Pedal Pulses Present] : the pedal pulses are present [No Palpable Aorta] : no palpable aorta [No Extremity Clubbing/Cyanosis] : no extremity clubbing/cyanosis [___ +] : bilateral [unfilled]U+ pretibial pitting edema [Soft] : abdomen soft [Non Tender] : non-tender [Non-distended] : non-distended [No Masses] : no abdominal mass palpated [No HSM] : no HSM [Normal Posterior Cervical Nodes] : no posterior cervical lymphadenopathy [Normal Anterior Cervical Nodes] : no anterior cervical lymphadenopathy [Normal Bowel Sounds] : normal bowel sounds [No Spinal Tenderness] : no spinal tenderness [No CVA Tenderness] : no CVA  tenderness [Grossly Normal Strength/Tone] : grossly normal strength/tone [No Joint Swelling] : no joint swelling [No Rash] : no rash [Coordination Grossly Intact] : coordination grossly intact [No Focal Deficits] : no focal deficits [Normal Gait] : normal gait [Deep Tendon Reflexes (DTR)] : deep tendon reflexes were 2+ and symmetric [Normal Insight/Judgement] : insight and judgment were intact [Normal Affect] : the affect was normal [de-identified] : venous insufficiency [de-identified] :  PPI site looks perfect

## 2020-02-13 NOTE — REVIEW OF SYSTEMS
[Earache] : no earache [Hearing Loss] : hearing loss [Hoarseness] : no hoarseness [Chest Pain] : no chest pain [Nasal Discharge] : no nasal discharge [Sore Throat] : no sore throat [Leg Claudication] : no leg claudication [Lower Ext Edema] : lower extremity edema [Palpitations] : no palpitations [Orthopnea] : no orthopnea [Wheezing] : no wheezing [Dyspnea on Exertion] : no dyspnea on exertion [Cough] : no cough [Joint Pain] : joint pain [Joint Stiffness] : no joint stiffness [Muscle Pain] : no muscle pain [Back Pain] : no back pain [Itching] : no itching [Skin Rash] : no skin rash [Mole Changes] : no mole changes [Negative] : Psychiatric [de-identified] : lesion ankle scabbed over

## 2020-03-05 ENCOUNTER — APPOINTMENT (OUTPATIENT)
Dept: INTERNAL MEDICINE | Facility: CLINIC | Age: 85
End: 2020-03-05
Payer: MEDICARE

## 2020-03-05 VITALS
SYSTOLIC BLOOD PRESSURE: 128 MMHG | WEIGHT: 160 LBS | DIASTOLIC BLOOD PRESSURE: 76 MMHG | BODY MASS INDEX: 31.41 KG/M2 | HEIGHT: 60 IN

## 2020-03-05 LAB — INR PPP: 2 RATIO

## 2020-03-05 PROCEDURE — 99213 OFFICE O/P EST LOW 20 MIN: CPT | Mod: 25

## 2020-03-05 PROCEDURE — 85610 PROTHROMBIN TIME: CPT | Mod: QW

## 2020-03-05 NOTE — HISTORY OF PRESENT ILLNESS
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

## 2020-03-05 NOTE — REVIEW OF SYSTEMS
[Earache] : no earache [Hearing Loss] : hearing loss [Hoarseness] : no hoarseness [Nasal Discharge] : no nasal discharge [Sore Throat] : no sore throat [Chest Pain] : no chest pain [Palpitations] : no palpitations [Leg Claudication] : no leg claudication [Lower Ext Edema] : lower extremity edema [Orthopnea] : no orthopnea [Wheezing] : no wheezing [Cough] : no cough [Dyspnea on Exertion] : no dyspnea on exertion [Joint Pain] : joint pain [Joint Stiffness] : no joint stiffness [Muscle Pain] : no muscle pain [Back Pain] : no back pain [Itching] : no itching [Mole Changes] : no mole changes [Skin Rash] : no skin rash [Negative] : Heme/Lymph

## 2020-03-05 NOTE — ASSESSMENT
[FreeTextEntry1] : INR ok\par Blood pressure under control\par remains much better since pacemaker implantation\par

## 2020-03-05 NOTE — PHYSICAL EXAM
[No Acute Distress] : no acute distress [Well Nourished] : well nourished [Well Developed] : well developed [Well-Appearing] : well-appearing [Normal Sclera/Conjunctiva] : normal sclera/conjunctiva [PERRL] : pupils equal round and reactive to light [Normal Outer Ear/Nose] : the outer ears and nose were normal in appearance [Normal Oropharynx] : the oropharynx was normal [No JVD] : no jugular venous distention [No Lymphadenopathy] : no lymphadenopathy [Supple] : supple [Thyroid Normal, No Nodules] : the thyroid was normal and there were no nodules present [No Respiratory Distress] : no respiratory distress  [No Accessory Muscle Use] : no accessory muscle use [Clear to Auscultation] : lungs were clear to auscultation bilaterally [Regular Rhythm] : with a regular rhythm [Normal S1, S2] : normal S1 and S2 [Normal Rate] : normal [Rhythm Regular] : regular [I] : a grade 1 [No Carotid Bruits] : no carotid bruits [No Abdominal Bruit] : a ~M bruit was not heard ~T in the abdomen [No Varicosities] : no varicosities [Pedal Pulses Present] : the pedal pulses are present [No Palpable Aorta] : no palpable aorta [No Extremity Clubbing/Cyanosis] : no extremity clubbing/cyanosis [___ +] : bilateral [unfilled]U+ pretibial pitting edema [Soft] : abdomen soft [Non Tender] : non-tender [Non-distended] : non-distended [No Masses] : no abdominal mass palpated [No HSM] : no HSM [Normal Bowel Sounds] : normal bowel sounds [Normal Posterior Cervical Nodes] : no posterior cervical lymphadenopathy [Normal Anterior Cervical Nodes] : no anterior cervical lymphadenopathy [No CVA Tenderness] : no CVA  tenderness [No Spinal Tenderness] : no spinal tenderness [No Joint Swelling] : no joint swelling [Grossly Normal Strength/Tone] : grossly normal strength/tone [No Rash] : no rash [Coordination Grossly Intact] : coordination grossly intact [No Focal Deficits] : no focal deficits [Normal Gait] : normal gait [Deep Tendon Reflexes (DTR)] : deep tendon reflexes were 2+ and symmetric [Normal Affect] : the affect was normal [Normal Insight/Judgement] : insight and judgment were intact [de-identified] : venous insufficiency [de-identified] :  PPI site looks perfect

## 2020-03-09 ENCOUNTER — APPOINTMENT (OUTPATIENT)
Dept: ELECTROPHYSIOLOGY | Facility: CLINIC | Age: 85
End: 2020-03-09
Payer: MEDICARE

## 2020-03-09 PROCEDURE — 93296 REM INTERROG EVL PM/IDS: CPT

## 2020-03-09 PROCEDURE — 93294 REM INTERROG EVL PM/LDLS PM: CPT

## 2020-03-24 ENCOUNTER — APPOINTMENT (OUTPATIENT)
Dept: INTERNAL MEDICINE | Facility: CLINIC | Age: 85
End: 2020-03-24
Payer: MEDICARE

## 2020-03-24 VITALS — BODY MASS INDEX: 31.25 KG/M2 | WEIGHT: 160 LBS

## 2020-03-24 VITALS
DIASTOLIC BLOOD PRESSURE: 62 MMHG | BODY MASS INDEX: 31.25 KG/M2 | TEMPERATURE: 98.4 F | HEIGHT: 60 IN | SYSTOLIC BLOOD PRESSURE: 130 MMHG

## 2020-03-24 LAB — INR PPP: 1.9 RATIO

## 2020-03-24 PROCEDURE — 99214 OFFICE O/P EST MOD 30 MIN: CPT | Mod: 25

## 2020-03-24 PROCEDURE — 85610 PROTHROMBIN TIME: CPT | Mod: QW

## 2020-03-24 NOTE — REVIEW OF SYSTEMS
[Hearing Loss] : hearing loss [Lower Ext Edema] : lower extremity edema [Joint Pain] : joint pain [Negative] : Heme/Lymph [Earache] : no earache [Hoarseness] : no hoarseness [Nasal Discharge] : no nasal discharge [Sore Throat] : no sore throat [Chest Pain] : no chest pain [Palpitations] : no palpitations [Leg Claudication] : no leg claudication [Orthopnea] : no orthopnea [Wheezing] : no wheezing [Cough] : no cough [Dyspnea on Exertion] : no dyspnea on exertion [Joint Stiffness] : no joint stiffness [Muscle Pain] : no muscle pain [Back Pain] : no back pain [Itching] : no itching [Mole Changes] : no mole changes [Skin Rash] : no skin rash

## 2020-03-24 NOTE — HISTORY OF PRESENT ILLNESS
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

## 2020-03-24 NOTE — PHYSICAL EXAM
[No Acute Distress] : no acute distress [Well Nourished] : well nourished [Well Developed] : well developed [Well-Appearing] : well-appearing [Normal Sclera/Conjunctiva] : normal sclera/conjunctiva [PERRL] : pupils equal round and reactive to light [Normal Outer Ear/Nose] : the outer ears and nose were normal in appearance [Normal Oropharynx] : the oropharynx was normal [No JVD] : no jugular venous distention [No Lymphadenopathy] : no lymphadenopathy [Supple] : supple [Thyroid Normal, No Nodules] : the thyroid was normal and there were no nodules present [No Respiratory Distress] : no respiratory distress  [No Accessory Muscle Use] : no accessory muscle use [Clear to Auscultation] : lungs were clear to auscultation bilaterally [Regular Rhythm] : with a regular rhythm [Normal S1, S2] : normal S1 and S2 [Normal Rate] : normal [Rhythm Regular] : regular [I] : a grade 1 [No Carotid Bruits] : no carotid bruits [No Abdominal Bruit] : a ~M bruit was not heard ~T in the abdomen [No Varicosities] : no varicosities [Pedal Pulses Present] : the pedal pulses are present [No Palpable Aorta] : no palpable aorta [No Extremity Clubbing/Cyanosis] : no extremity clubbing/cyanosis [Soft] : abdomen soft [Non Tender] : non-tender [Non-distended] : non-distended [No Masses] : no abdominal mass palpated [No HSM] : no HSM [Normal Bowel Sounds] : normal bowel sounds [Normal Posterior Cervical Nodes] : no posterior cervical lymphadenopathy [Normal Anterior Cervical Nodes] : no anterior cervical lymphadenopathy [No CVA Tenderness] : no CVA  tenderness [No Spinal Tenderness] : no spinal tenderness [No Joint Swelling] : no joint swelling [Grossly Normal Strength/Tone] : grossly normal strength/tone [No Rash] : no rash [Coordination Grossly Intact] : coordination grossly intact [No Focal Deficits] : no focal deficits [Normal Gait] : normal gait [Deep Tendon Reflexes (DTR)] : deep tendon reflexes were 2+ and symmetric [Normal Affect] : the affect was normal [Normal Insight/Judgement] : insight and judgment were intact [___ +] : bilateral [unfilled]U+ pitting edema to the ankles [de-identified] : venous insufficiency [de-identified] :  PPI site looks perfect

## 2020-03-24 NOTE — ASSESSMENT
[FreeTextEntry1] : INR just low at 1.9\par blood pressure under control\par remains much better since pacemaker implantation\par some fatigue\par stable edema

## 2020-04-07 ENCOUNTER — APPOINTMENT (OUTPATIENT)
Dept: INTERNAL MEDICINE | Facility: CLINIC | Age: 85
End: 2020-04-07
Payer: MEDICARE

## 2020-04-07 LAB — INR PPP: 2.6 RATIO

## 2020-04-07 PROCEDURE — 99214 OFFICE O/P EST MOD 30 MIN: CPT | Mod: 25

## 2020-04-07 PROCEDURE — 85610 PROTHROMBIN TIME: CPT | Mod: QW

## 2020-04-08 VITALS
BODY MASS INDEX: 31.61 KG/M2 | DIASTOLIC BLOOD PRESSURE: 62 MMHG | WEIGHT: 161 LBS | HEIGHT: 60 IN | SYSTOLIC BLOOD PRESSURE: 124 MMHG

## 2020-04-08 NOTE — ASSESSMENT
[FreeTextEntry1] : INR perfect\par blood pressure under control\par remains much better since pacemaker implantation\par some fatigue\par stable edema\par skin lesion ankle ? from venous insufficiency of skin cancer

## 2020-04-08 NOTE — PHYSICAL EXAM
[No Acute Distress] : no acute distress [Well Nourished] : well nourished [Well Developed] : well developed [Well-Appearing] : well-appearing [Normal Sclera/Conjunctiva] : normal sclera/conjunctiva [PERRL] : pupils equal round and reactive to light [Normal Outer Ear/Nose] : the outer ears and nose were normal in appearance [Normal Oropharynx] : the oropharynx was normal [No JVD] : no jugular venous distention [No Lymphadenopathy] : no lymphadenopathy [Supple] : supple [Thyroid Normal, No Nodules] : the thyroid was normal and there were no nodules present [No Respiratory Distress] : no respiratory distress  [No Accessory Muscle Use] : no accessory muscle use [Clear to Auscultation] : lungs were clear to auscultation bilaterally [Regular Rhythm] : with a regular rhythm [Normal S1, S2] : normal S1 and S2 [Normal Rate] : normal [Rhythm Regular] : regular [I] : a grade 1 [No Carotid Bruits] : no carotid bruits [No Abdominal Bruit] : a ~M bruit was not heard ~T in the abdomen [No Varicosities] : no varicosities [Pedal Pulses Present] : the pedal pulses are present [No Palpable Aorta] : no palpable aorta [No Extremity Clubbing/Cyanosis] : no extremity clubbing/cyanosis [___ +] : bilateral [unfilled]U+ pretibial pitting edema [Soft] : abdomen soft [Non Tender] : non-tender [Non-distended] : non-distended [No Masses] : no abdominal mass palpated [No HSM] : no HSM [Normal Bowel Sounds] : normal bowel sounds [Normal Posterior Cervical Nodes] : no posterior cervical lymphadenopathy [Normal Anterior Cervical Nodes] : no anterior cervical lymphadenopathy [No CVA Tenderness] : no CVA  tenderness [No Spinal Tenderness] : no spinal tenderness [No Joint Swelling] : no joint swelling [Grossly Normal Strength/Tone] : grossly normal strength/tone [Coordination Grossly Intact] : coordination grossly intact [No Focal Deficits] : no focal deficits [Normal Gait] : normal gait [Deep Tendon Reflexes (DTR)] : deep tendon reflexes were 2+ and symmetric [Normal Affect] : the affect was normal [Normal Insight/Judgement] : insight and judgment were intact [Speech Grossly Normal] : speech grossly normal [Memory Grossly Normal] : memory grossly normal [Alert and Oriented x3] : oriented to person, place, and time [de-identified] : venous insufficiency [de-identified] :  PPI site looks perfect, stasis dermatitis, scaly lesion ankle ? cant exclude skin cancer

## 2020-04-08 NOTE — HISTORY OF PRESENT ILLNESS
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

## 2020-05-05 ENCOUNTER — APPOINTMENT (OUTPATIENT)
Dept: INTERNAL MEDICINE | Facility: CLINIC | Age: 85
End: 2020-05-05
Payer: MEDICARE

## 2020-05-05 VITALS
SYSTOLIC BLOOD PRESSURE: 133 MMHG | WEIGHT: 163 LBS | BODY MASS INDEX: 31.83 KG/M2 | HEART RATE: 60 BPM | DIASTOLIC BLOOD PRESSURE: 72 MMHG

## 2020-05-05 LAB — POCT-PROTHROMBIN TIME: 2.7 SECS

## 2020-05-05 PROCEDURE — 85610 PROTHROMBIN TIME: CPT | Mod: QW

## 2020-05-05 PROCEDURE — 99214 OFFICE O/P EST MOD 30 MIN: CPT | Mod: 25

## 2020-05-05 NOTE — HISTORY OF PRESENT ILLNESS
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

## 2020-05-05 NOTE — REVIEW OF SYSTEMS
[Hearing Loss] : hearing loss [Lower Ext Edema] : lower extremity edema [Joint Pain] : joint pain [Negative] : Psychiatric [Earache] : no earache [Hoarseness] : no hoarseness [Chest Pain] : no chest pain [Nasal Discharge] : no nasal discharge [Sore Throat] : no sore throat [Palpitations] : no palpitations [Leg Claudication] : no leg claudication [Orthopnea] : no orthopnea [Cough] : no cough [Wheezing] : no wheezing [Dyspnea on Exertion] : no dyspnea on exertion [Joint Stiffness] : no joint stiffness [Muscle Pain] : no muscle pain [Itching] : no itching [Back Pain] : no back pain [Skin Rash] : no skin rash [Mole Changes] : no mole changes

## 2020-05-05 NOTE — ASSESSMENT
[FreeTextEntry1] : INR perfect\par blood pressure under control\par remains much better since pacemaker implantation\par some fatigue\par stable edema\par skin lesion ankle ? from venous insufficiency or skin cancer

## 2020-05-05 NOTE — PHYSICAL EXAM
[No Acute Distress] : no acute distress [Well Nourished] : well nourished [Well Developed] : well developed [Well-Appearing] : well-appearing [Normal Sclera/Conjunctiva] : normal sclera/conjunctiva [Normal Outer Ear/Nose] : the outer ears and nose were normal in appearance [Normal Oropharynx] : the oropharynx was normal [No JVD] : no jugular venous distention [No Lymphadenopathy] : no lymphadenopathy [Supple] : supple [Thyroid Normal, No Nodules] : the thyroid was normal and there were no nodules present [No Respiratory Distress] : no respiratory distress  [No Accessory Muscle Use] : no accessory muscle use [Regular Rhythm] : with a regular rhythm [Clear to Auscultation] : lungs were clear to auscultation bilaterally [Normal Rate] : normal [Normal S1, S2] : normal S1 and S2 [Rhythm Regular] : regular [I] : a grade 1 [No Abdominal Bruit] : a ~M bruit was not heard ~T in the abdomen [No Carotid Bruits] : no carotid bruits [Pedal Pulses Present] : the pedal pulses are present [No Varicosities] : no varicosities [No Extremity Clubbing/Cyanosis] : no extremity clubbing/cyanosis [No Palpable Aorta] : no palpable aorta [Soft] : abdomen soft [___ +] : bilateral [unfilled]U+ pretibial pitting edema [Non Tender] : non-tender [No Masses] : no abdominal mass palpated [Non-distended] : non-distended [No HSM] : no HSM [Normal Bowel Sounds] : normal bowel sounds [Normal Anterior Cervical Nodes] : no anterior cervical lymphadenopathy [Normal Posterior Cervical Nodes] : no posterior cervical lymphadenopathy [No CVA Tenderness] : no CVA  tenderness [No Spinal Tenderness] : no spinal tenderness [Grossly Normal Strength/Tone] : grossly normal strength/tone [Coordination Grossly Intact] : coordination grossly intact [No Joint Swelling] : no joint swelling [No Focal Deficits] : no focal deficits [Deep Tendon Reflexes (DTR)] : deep tendon reflexes were 2+ and symmetric [Normal Gait] : normal gait [Memory Grossly Normal] : memory grossly normal [Speech Grossly Normal] : speech grossly normal [Normal Affect] : the affect was normal [Alert and Oriented x3] : oriented to person, place, and time [Normal Insight/Judgement] : insight and judgment were intact [de-identified] : venous insufficiency [de-identified] :  PPI site good, cvs changes, ? venous ulcer, scaly lesion ankle ? cant exclude cancer

## 2020-06-01 ENCOUNTER — APPOINTMENT (OUTPATIENT)
Dept: INTERNAL MEDICINE | Facility: CLINIC | Age: 85
End: 2020-06-01
Payer: MEDICARE

## 2020-06-01 VITALS
BODY MASS INDEX: 32.2 KG/M2 | WEIGHT: 164 LBS | HEIGHT: 60 IN | SYSTOLIC BLOOD PRESSURE: 174 MMHG | DIASTOLIC BLOOD PRESSURE: 68 MMHG | HEART RATE: 77 BPM

## 2020-06-01 VITALS — HEIGHT: 60 IN | BODY MASS INDEX: 31.83 KG/M2 | DIASTOLIC BLOOD PRESSURE: 50 MMHG | SYSTOLIC BLOOD PRESSURE: 132 MMHG

## 2020-06-01 DIAGNOSIS — I44.1 ATRIOVENTRICULAR BLOCK, SECOND DEGREE: ICD-10-CM

## 2020-06-01 DIAGNOSIS — I45.9 CONDUCTION DISORDER, UNSPECIFIED: ICD-10-CM

## 2020-06-01 LAB — INR PPP: 1.9 RATIO

## 2020-06-01 PROCEDURE — 85610 PROTHROMBIN TIME: CPT | Mod: QW

## 2020-06-01 PROCEDURE — 99214 OFFICE O/P EST MOD 30 MIN: CPT | Mod: 25

## 2020-06-02 PROBLEM — I45.9 HEART BLOCK: Status: ACTIVE | Noted: 2019-05-02

## 2020-06-02 PROBLEM — I44.1 2ND DEGREE AV BLOCK: Status: ACTIVE | Noted: 2019-12-30

## 2020-06-02 NOTE — HISTORY OF PRESENT ILLNESS
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

## 2020-06-02 NOTE — PHYSICAL EXAM
[No Acute Distress] : no acute distress [Well Nourished] : well nourished [Well Developed] : well developed [Well-Appearing] : well-appearing [Normal Sclera/Conjunctiva] : normal sclera/conjunctiva [Normal Outer Ear/Nose] : the outer ears and nose were normal in appearance [No JVD] : no jugular venous distention [Normal Oropharynx] : the oropharynx was normal [No Lymphadenopathy] : no lymphadenopathy [Supple] : supple [No Respiratory Distress] : no respiratory distress  [Thyroid Normal, No Nodules] : the thyroid was normal and there were no nodules present [No Accessory Muscle Use] : no accessory muscle use [Clear to Auscultation] : lungs were clear to auscultation bilaterally [Regular Rhythm] : with a regular rhythm [Normal S1, S2] : normal S1 and S2 [Normal Rate] : normal [I] : a grade 1 [Rhythm Regular] : regular [No Carotid Bruits] : no carotid bruits [No Abdominal Bruit] : a ~M bruit was not heard ~T in the abdomen [No Varicosities] : no varicosities [Pedal Pulses Present] : the pedal pulses are present [No Palpable Aorta] : no palpable aorta [No Extremity Clubbing/Cyanosis] : no extremity clubbing/cyanosis [___ +] : bilateral [unfilled]U+ pretibial pitting edema [Soft] : abdomen soft [Non Tender] : non-tender [Non-distended] : non-distended [No Masses] : no abdominal mass palpated [Normal Bowel Sounds] : normal bowel sounds [No HSM] : no HSM [Normal Posterior Cervical Nodes] : no posterior cervical lymphadenopathy [Normal Anterior Cervical Nodes] : no anterior cervical lymphadenopathy [No CVA Tenderness] : no CVA  tenderness [No Spinal Tenderness] : no spinal tenderness [No Joint Swelling] : no joint swelling [Grossly Normal Strength/Tone] : grossly normal strength/tone [Coordination Grossly Intact] : coordination grossly intact [Normal Gait] : normal gait [No Focal Deficits] : no focal deficits [Speech Grossly Normal] : speech grossly normal [Deep Tendon Reflexes (DTR)] : deep tendon reflexes were 2+ and symmetric [Normal Affect] : the affect was normal [Memory Grossly Normal] : memory grossly normal [Alert and Oriented x3] : oriented to person, place, and time [Normal Insight/Judgement] : insight and judgment were intact [de-identified] : venous insufficiency [de-identified] :  PPI site good, cvs changes, scaly lesion ankle left same

## 2020-06-02 NOTE — REVIEW OF SYSTEMS
[Hearing Loss] : hearing loss [Lower Ext Edema] : lower extremity edema [Joint Pain] : joint pain [Negative] : Heme/Lymph [Earache] : no earache [Hoarseness] : no hoarseness [Nasal Discharge] : no nasal discharge [Palpitations] : no palpitations [Sore Throat] : no sore throat [Chest Pain] : no chest pain [Leg Claudication] : no leg claudication [Orthopnea] : no orthopnea [Wheezing] : no wheezing [Cough] : no cough [Muscle Pain] : no muscle pain [Dyspnea on Exertion] : no dyspnea on exertion [Joint Stiffness] : no joint stiffness [Itching] : no itching [Mole Changes] : no mole changes [Back Pain] : no back pain [Skin Rash] : no skin rash

## 2020-06-02 NOTE — ASSESSMENT
[FreeTextEntry1] : INR just low\par blood pressure ok\par remains much better since pacemaker implantation\par skin lesion ankle ? from venous insufficiency or skin cancer

## 2020-06-08 ENCOUNTER — APPOINTMENT (OUTPATIENT)
Dept: ELECTROPHYSIOLOGY | Facility: CLINIC | Age: 85
End: 2020-06-08
Payer: MEDICARE

## 2020-06-08 PROCEDURE — 93294 REM INTERROG EVL PM/LDLS PM: CPT

## 2020-06-08 PROCEDURE — 93296 REM INTERROG EVL PM/IDS: CPT

## 2020-06-24 ENCOUNTER — APPOINTMENT (OUTPATIENT)
Dept: INTERNAL MEDICINE | Facility: CLINIC | Age: 85
End: 2020-06-24
Payer: MEDICARE

## 2020-06-24 VITALS — SYSTOLIC BLOOD PRESSURE: 128 MMHG | DIASTOLIC BLOOD PRESSURE: 58 MMHG

## 2020-06-24 VITALS — BODY MASS INDEX: 32.42 KG/M2 | WEIGHT: 166 LBS

## 2020-06-24 LAB — INR PPP: 3.4 RATIO

## 2020-06-24 PROCEDURE — 85610 PROTHROMBIN TIME: CPT | Mod: QW

## 2020-06-24 PROCEDURE — 99214 OFFICE O/P EST MOD 30 MIN: CPT | Mod: 25

## 2020-06-24 NOTE — PHYSICAL EXAM
[No Acute Distress] : no acute distress [Well Nourished] : well nourished [Well-Appearing] : well-appearing [Normal Sclera/Conjunctiva] : normal sclera/conjunctiva [Well Developed] : well developed [Normal Outer Ear/Nose] : the outer ears and nose were normal in appearance [No JVD] : no jugular venous distention [Supple] : supple [No Respiratory Distress] : no respiratory distress  [No Lymphadenopathy] : no lymphadenopathy [Clear to Auscultation] : lungs were clear to auscultation bilaterally [No Accessory Muscle Use] : no accessory muscle use [Normal Rate] : normal [Normal S1, S2] : normal S1 and S2 [Regular Rhythm] : with a regular rhythm [Rhythm Regular] : regular [I] : a grade 1 [No Carotid Bruits] : no carotid bruits [No Varicosities] : no varicosities [No Abdominal Bruit] : a ~M bruit was not heard ~T in the abdomen [Pedal Pulses Present] : the pedal pulses are present [No Extremity Clubbing/Cyanosis] : no extremity clubbing/cyanosis [No Palpable Aorta] : no palpable aorta [___ +] : bilateral [unfilled]U+ pretibial pitting edema [Soft] : abdomen soft [Non Tender] : non-tender [No HSM] : no HSM [Non-distended] : non-distended [No Masses] : no abdominal mass palpated [Normal Bowel Sounds] : normal bowel sounds [Normal Posterior Cervical Nodes] : no posterior cervical lymphadenopathy [Normal Anterior Cervical Nodes] : no anterior cervical lymphadenopathy [No CVA Tenderness] : no CVA  tenderness [No Spinal Tenderness] : no spinal tenderness [No Joint Swelling] : no joint swelling [Grossly Normal Strength/Tone] : grossly normal strength/tone [Coordination Grossly Intact] : coordination grossly intact [Normal Gait] : normal gait [No Focal Deficits] : no focal deficits [Deep Tendon Reflexes (DTR)] : deep tendon reflexes were 2+ and symmetric [Speech Grossly Normal] : speech grossly normal [Memory Grossly Normal] : memory grossly normal [Alert and Oriented x3] : oriented to person, place, and time [Normal Affect] : the affect was normal [Normal Insight/Judgement] : insight and judgment were intact [de-identified] : venous insufficiency, scab left ankle ? related to venous insufficiency [de-identified] : cvs changes, scaly lesion ankle left same

## 2020-06-24 NOTE — HISTORY OF PRESENT ILLNESS
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

## 2020-06-24 NOTE — REVIEW OF SYSTEMS
[Hearing Loss] : hearing loss [Lower Ext Edema] : lower extremity edema [Joint Pain] : joint pain [Negative] : Heme/Lymph [Earache] : no earache [Hoarseness] : no hoarseness [Sore Throat] : no sore throat [Nasal Discharge] : no nasal discharge [Chest Pain] : no chest pain [Palpitations] : no palpitations [Leg Claudication] : no leg claudication [Orthopnea] : no orthopnea [Wheezing] : no wheezing [Cough] : no cough [Dyspnea on Exertion] : no dyspnea on exertion [Joint Stiffness] : no joint stiffness [Muscle Pain] : no muscle pain [Back Pain] : no back pain [Itching] : no itching [Skin Rash] : no skin rash [Mole Changes] : no mole changes

## 2020-06-24 NOTE — ASSESSMENT
[FreeTextEntry1] : INR a bit high\par blood pressure ok\par remains much better since pacemaker implantation\par skin lesion ankle ? from venous insufficiency or skin cancer

## 2020-07-02 ENCOUNTER — APPOINTMENT (OUTPATIENT)
Dept: ELECTROPHYSIOLOGY | Facility: CLINIC | Age: 85
End: 2020-07-02

## 2020-07-07 ENCOUNTER — APPOINTMENT (OUTPATIENT)
Dept: INTERNAL MEDICINE | Facility: CLINIC | Age: 85
End: 2020-07-07
Payer: MEDICARE

## 2020-07-07 VITALS — DIASTOLIC BLOOD PRESSURE: 60 MMHG | SYSTOLIC BLOOD PRESSURE: 122 MMHG

## 2020-07-07 LAB — INR PPP: 2.5 RATIO

## 2020-07-07 PROCEDURE — 99214 OFFICE O/P EST MOD 30 MIN: CPT | Mod: 25

## 2020-07-07 PROCEDURE — 85610 PROTHROMBIN TIME: CPT | Mod: QW

## 2020-07-07 NOTE — HISTORY OF PRESENT ILLNESS
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

## 2020-07-07 NOTE — REVIEW OF SYSTEMS
[Hearing Loss] : hearing loss [Lower Ext Edema] : lower extremity edema [Diarrhea] : diarrhea [Joint Pain] : joint pain [Negative] : Psychiatric [Earache] : no earache [Hoarseness] : no hoarseness [Nasal Discharge] : no nasal discharge [Sore Throat] : no sore throat [Chest Pain] : no chest pain [Palpitations] : no palpitations [Leg Claudication] : no leg claudication [Orthopnea] : no orthopnea [Wheezing] : no wheezing [Cough] : no cough [Dyspnea on Exertion] : no dyspnea on exertion [Abdominal Pain] : no abdominal pain [Nausea] : no nausea [Constipation] : no constipation [Heartburn] : no heartburn [Vomiting] : no vomiting [Melena] : no melena [Joint Stiffness] : no joint stiffness [Muscle Pain] : no muscle pain [Back Pain] : no back pain [Itching] : no itching [Mole Changes] : no mole changes [Skin Rash] : no skin rash [FreeTextEntry7] : on daily Miralax

## 2020-07-07 NOTE — ASSESSMENT
[FreeTextEntry1] : INR perfect\par blood pressure good\par remains much better since pacemaker implantation\par skin lesion ankle looks a little better? from venous insufficiency or skin cancer

## 2020-07-07 NOTE — PHYSICAL EXAM
[No Acute Distress] : no acute distress [Well Nourished] : well nourished [Well Developed] : well developed [Normal Sclera/Conjunctiva] : normal sclera/conjunctiva [Well-Appearing] : well-appearing [No JVD] : no jugular venous distention [Normal Outer Ear/Nose] : the outer ears and nose were normal in appearance [No Lymphadenopathy] : no lymphadenopathy [No Accessory Muscle Use] : no accessory muscle use [Supple] : supple [No Respiratory Distress] : no respiratory distress  [Regular Rhythm] : with a regular rhythm [Clear to Auscultation] : lungs were clear to auscultation bilaterally [Normal S1, S2] : normal S1 and S2 [Normal Rate] : normal [Rhythm Regular] : regular [I] : a grade 1 [No Carotid Bruits] : no carotid bruits [No Varicosities] : no varicosities [No Abdominal Bruit] : a ~M bruit was not heard ~T in the abdomen [Pedal Pulses Present] : the pedal pulses are present [No Palpable Aorta] : no palpable aorta [No Extremity Clubbing/Cyanosis] : no extremity clubbing/cyanosis [Soft] : abdomen soft [___ +] : bilateral [unfilled]U+ pretibial pitting edema [Non-distended] : non-distended [Non Tender] : non-tender [No Masses] : no abdominal mass palpated [No HSM] : no HSM [Normal Posterior Cervical Nodes] : no posterior cervical lymphadenopathy [Normal Bowel Sounds] : normal bowel sounds [Normal Anterior Cervical Nodes] : no anterior cervical lymphadenopathy [No CVA Tenderness] : no CVA  tenderness [No Spinal Tenderness] : no spinal tenderness [No Joint Swelling] : no joint swelling [Grossly Normal Strength/Tone] : grossly normal strength/tone [Coordination Grossly Intact] : coordination grossly intact [No Focal Deficits] : no focal deficits [Deep Tendon Reflexes (DTR)] : deep tendon reflexes were 2+ and symmetric [Normal Gait] : normal gait [Speech Grossly Normal] : speech grossly normal [Alert and Oriented x3] : oriented to person, place, and time [Memory Grossly Normal] : memory grossly normal [Normal Affect] : the affect was normal [Normal Insight/Judgement] : insight and judgment were intact [de-identified] : venous insufficiency, scab left ankle ? related to venous insufficiency [de-identified] : cvs changes, scaly lesion ankle left same

## 2020-07-31 NOTE — ED ADULT NURSE REASSESSMENT NOTE - GENERAL PATIENT STATE
Reviewed tacro level with Dr. Donahue. Will increase tacro to 6/7 and repeat level next week with BMP. Pt notified and understand. Pt will go to lab on Thursday morning. Lab appt made.   resting/sleeping/smiling/interactive/cooperative/family/SO at bedside/comfortable appearance/no change observed

## 2020-08-11 ENCOUNTER — APPOINTMENT (OUTPATIENT)
Dept: INTERNAL MEDICINE | Facility: CLINIC | Age: 85
End: 2020-08-11
Payer: MEDICARE

## 2020-08-11 VITALS
HEART RATE: 60 BPM | SYSTOLIC BLOOD PRESSURE: 136 MMHG | BODY MASS INDEX: 31.05 KG/M2 | TEMPERATURE: 98.6 F | OXYGEN SATURATION: 97 % | DIASTOLIC BLOOD PRESSURE: 66 MMHG | WEIGHT: 159 LBS

## 2020-08-11 LAB — INR PPP: 2.5 RATIO

## 2020-08-11 PROCEDURE — 99214 OFFICE O/P EST MOD 30 MIN: CPT | Mod: 25

## 2020-08-11 PROCEDURE — 85610 PROTHROMBIN TIME: CPT | Mod: QW

## 2020-08-11 NOTE — HISTORY OF PRESENT ILLNESS
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

## 2020-08-11 NOTE — ASSESSMENT
[FreeTextEntry1] : INR perfect\par blood pressure good\par remains much better since pacemaker implantation\par skin lesion ankle same

## 2020-08-11 NOTE — PHYSICAL EXAM
[No Acute Distress] : no acute distress [Well Nourished] : well nourished [Well Developed] : well developed [Well-Appearing] : well-appearing [Normal Sclera/Conjunctiva] : normal sclera/conjunctiva [Normal Outer Ear/Nose] : the outer ears and nose were normal in appearance [No JVD] : no jugular venous distention [Supple] : supple [No Lymphadenopathy] : no lymphadenopathy [No Respiratory Distress] : no respiratory distress  [No Accessory Muscle Use] : no accessory muscle use [Clear to Auscultation] : lungs were clear to auscultation bilaterally [Regular Rhythm] : with a regular rhythm [Normal S1, S2] : normal S1 and S2 [Normal Rate] : normal [Rhythm Regular] : regular [I] : a grade 1 [No Carotid Bruits] : no carotid bruits [No Abdominal Bruit] : a ~M bruit was not heard ~T in the abdomen [No Varicosities] : no varicosities [Pedal Pulses Present] : the pedal pulses are present [No Palpable Aorta] : no palpable aorta [No Extremity Clubbing/Cyanosis] : no extremity clubbing/cyanosis [___ +] : bilateral [unfilled]U+ pretibial pitting edema [Non Tender] : non-tender [Soft] : abdomen soft [Non-distended] : non-distended [No HSM] : no HSM [No Masses] : no abdominal mass palpated [Normal Bowel Sounds] : normal bowel sounds [No CVA Tenderness] : no CVA  tenderness [Normal Anterior Cervical Nodes] : no anterior cervical lymphadenopathy [Normal Posterior Cervical Nodes] : no posterior cervical lymphadenopathy [No Spinal Tenderness] : no spinal tenderness [No Joint Swelling] : no joint swelling [Grossly Normal Strength/Tone] : grossly normal strength/tone [Coordination Grossly Intact] : coordination grossly intact [No Focal Deficits] : no focal deficits [Normal Gait] : normal gait [Deep Tendon Reflexes (DTR)] : deep tendon reflexes were 2+ and symmetric [Speech Grossly Normal] : speech grossly normal [Memory Grossly Normal] : memory grossly normal [Normal Affect] : the affect was normal [Alert and Oriented x3] : oriented to person, place, and time [Normal Insight/Judgement] : insight and judgment were intact [de-identified] : venous insufficiency, scab left ankle unchanged ? related to venous insufficiency [de-identified] : cvs changes

## 2020-08-11 NOTE — REVIEW OF SYSTEMS
[Hearing Loss] : hearing loss [Lower Ext Edema] : lower extremity edema [Joint Pain] : joint pain [Negative] : Heme/Lymph [Earache] : no earache [Nasal Discharge] : no nasal discharge [Hoarseness] : no hoarseness [Palpitations] : no palpitations [Chest Pain] : no chest pain [Sore Throat] : no sore throat [Leg Claudication] : no leg claudication [Orthopnea] : no orthopnea [Dyspnea on Exertion] : no dyspnea on exertion [Cough] : no cough [Abdominal Pain] : no abdominal pain [Wheezing] : no wheezing [Constipation] : no constipation [Nausea] : no nausea [Heartburn] : no heartburn [Diarrhea] : diarrhea [Vomiting] : no vomiting [Melena] : no melena [Joint Stiffness] : no joint stiffness [Itching] : no itching [Muscle Pain] : no muscle pain [Back Pain] : no back pain [Skin Rash] : no skin rash [Mole Changes] : no mole changes [FreeTextEntry7] : on daily Miralax

## 2020-09-09 ENCOUNTER — APPOINTMENT (OUTPATIENT)
Dept: INTERNAL MEDICINE | Facility: CLINIC | Age: 85
End: 2020-09-09
Payer: MEDICARE

## 2020-09-09 VITALS — DIASTOLIC BLOOD PRESSURE: 70 MMHG | SYSTOLIC BLOOD PRESSURE: 118 MMHG

## 2020-09-09 VITALS — WEIGHT: 162 LBS | BODY MASS INDEX: 31.64 KG/M2

## 2020-09-09 VITALS — HEIGHT: 60 IN | BODY MASS INDEX: 31.64 KG/M2

## 2020-09-09 LAB — INR PPP: 2.8 RATIO

## 2020-09-09 PROCEDURE — 99214 OFFICE O/P EST MOD 30 MIN: CPT | Mod: 25

## 2020-09-09 PROCEDURE — 85610 PROTHROMBIN TIME: CPT | Mod: QW

## 2020-09-09 NOTE — HISTORY OF PRESENT ILLNESS
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

## 2020-09-09 NOTE — ASSESSMENT
[FreeTextEntry1] : INR in range\par blood pressure good\par improved since pacemaker implantation\par skin lesion left ankle same

## 2020-09-09 NOTE — PHYSICAL EXAM
[No Acute Distress] : no acute distress [Well Nourished] : well nourished [Well Developed] : well developed [Well-Appearing] : well-appearing [Normal Sclera/Conjunctiva] : normal sclera/conjunctiva [Normal Outer Ear/Nose] : the outer ears and nose were normal in appearance [No JVD] : no jugular venous distention [No Lymphadenopathy] : no lymphadenopathy [Supple] : supple [No Accessory Muscle Use] : no accessory muscle use [No Respiratory Distress] : no respiratory distress  [Clear to Auscultation] : lungs were clear to auscultation bilaterally [Regular Rhythm] : with a regular rhythm [Normal S1, S2] : normal S1 and S2 [Normal Rate] : normal [I] : a grade 1 [Rhythm Regular] : regular [No Carotid Bruits] : no carotid bruits [No Abdominal Bruit] : a ~M bruit was not heard ~T in the abdomen [No Varicosities] : no varicosities [No Extremity Clubbing/Cyanosis] : no extremity clubbing/cyanosis [No Palpable Aorta] : no palpable aorta [Pedal Pulses Present] : the pedal pulses are present [Non Tender] : non-tender [___ +] : bilateral [unfilled]U+ pretibial pitting edema [Soft] : abdomen soft [Non-distended] : non-distended [No Masses] : no abdominal mass palpated [No HSM] : no HSM [Normal Bowel Sounds] : normal bowel sounds [Normal Posterior Cervical Nodes] : no posterior cervical lymphadenopathy [Normal Anterior Cervical Nodes] : no anterior cervical lymphadenopathy [No CVA Tenderness] : no CVA  tenderness [No Joint Swelling] : no joint swelling [No Spinal Tenderness] : no spinal tenderness [Grossly Normal Strength/Tone] : grossly normal strength/tone [Coordination Grossly Intact] : coordination grossly intact [Normal Gait] : normal gait [No Focal Deficits] : no focal deficits [Speech Grossly Normal] : speech grossly normal [Deep Tendon Reflexes (DTR)] : deep tendon reflexes were 2+ and symmetric [Memory Grossly Normal] : memory grossly normal [Normal Affect] : the affect was normal [Alert and Oriented x3] : oriented to person, place, and time [Normal Insight/Judgement] : insight and judgment were intact [de-identified] : venous insufficiency, scab left ankle unchanged  [de-identified] : cvs changes, scab left ankle

## 2020-09-09 NOTE — REVIEW OF SYSTEMS
[Hearing Loss] : hearing loss [Lower Ext Edema] : lower extremity edema [Joint Pain] : joint pain [Negative] : Heme/Lymph [Earache] : no earache [Hoarseness] : no hoarseness [Sore Throat] : no sore throat [Nasal Discharge] : no nasal discharge [Palpitations] : no palpitations [Chest Pain] : no chest pain [Wheezing] : no wheezing [Orthopnea] : no orthopnea [Leg Claudication] : no leg claudication [Cough] : no cough [Abdominal Pain] : no abdominal pain [Dyspnea on Exertion] : no dyspnea on exertion [Diarrhea] : diarrhea [Nausea] : no nausea [Constipation] : no constipation [Heartburn] : no heartburn [Vomiting] : no vomiting [Muscle Pain] : no muscle pain [Joint Stiffness] : no joint stiffness [Melena] : no melena [Itching] : no itching [Back Pain] : no back pain [Skin Rash] : no skin rash [Mole Changes] : no mole changes

## 2020-09-14 ENCOUNTER — APPOINTMENT (OUTPATIENT)
Dept: ELECTROPHYSIOLOGY | Facility: CLINIC | Age: 85
End: 2020-09-14
Payer: MEDICARE

## 2020-09-14 PROCEDURE — 93294 REM INTERROG EVL PM/LDLS PM: CPT

## 2020-09-14 PROCEDURE — 93296 REM INTERROG EVL PM/IDS: CPT

## 2020-10-14 ENCOUNTER — APPOINTMENT (OUTPATIENT)
Dept: INTERNAL MEDICINE | Facility: CLINIC | Age: 85
End: 2020-10-14
Payer: MEDICARE

## 2020-10-14 VITALS
HEART RATE: 66 BPM | TEMPERATURE: 98.1 F | WEIGHT: 160 LBS | OXYGEN SATURATION: 98 % | SYSTOLIC BLOOD PRESSURE: 122 MMHG | BODY MASS INDEX: 31.25 KG/M2 | DIASTOLIC BLOOD PRESSURE: 64 MMHG

## 2020-10-14 DIAGNOSIS — R19.4 CHANGE IN BOWEL HABIT: ICD-10-CM

## 2020-10-14 LAB — INR PPP: 3.1 RATIO

## 2020-10-14 PROCEDURE — 85610 PROTHROMBIN TIME: CPT | Mod: QW

## 2020-10-14 PROCEDURE — 82270 OCCULT BLOOD FECES: CPT

## 2020-10-14 PROCEDURE — 36415 COLL VENOUS BLD VENIPUNCTURE: CPT

## 2020-10-14 PROCEDURE — 99214 OFFICE O/P EST MOD 30 MIN: CPT | Mod: 25

## 2020-10-14 NOTE — REVIEW OF SYSTEMS
[Hearing Loss] : hearing loss [Lower Ext Edema] : lower extremity edema [Joint Pain] : joint pain [Negative] : Heme/Lymph [Diarrhea] : diarrhea [Earache] : no earache [Hoarseness] : no hoarseness [Nasal Discharge] : no nasal discharge [Sore Throat] : no sore throat [Chest Pain] : no chest pain [Leg Claudication] : no leg claudication [Palpitations] : no palpitations [Orthopnea] : no orthopnea [Wheezing] : no wheezing [Cough] : no cough [Nausea] : no nausea [Abdominal Pain] : no abdominal pain [Dyspnea on Exertion] : no dyspnea on exertion [Constipation] : no constipation [Heartburn] : no heartburn [Vomiting] : no vomiting [Melena] : no melena [Joint Stiffness] : no joint stiffness [Muscle Pain] : no muscle pain [Itching] : no itching [Back Pain] : no back pain [Mole Changes] : no mole changes [Skin Rash] : no skin rash [FreeTextEntry7] : opnce a week

## 2020-10-14 NOTE — PHYSICAL EXAM
[No Acute Distress] : no acute distress [Well Nourished] : well nourished [Well Developed] : well developed [Well-Appearing] : well-appearing [Normal Sclera/Conjunctiva] : normal sclera/conjunctiva [Normal Outer Ear/Nose] : the outer ears and nose were normal in appearance [No JVD] : no jugular venous distention [No Lymphadenopathy] : no lymphadenopathy [Supple] : supple [No Respiratory Distress] : no respiratory distress  [No Accessory Muscle Use] : no accessory muscle use [Clear to Auscultation] : lungs were clear to auscultation bilaterally [Regular Rhythm] : with a regular rhythm [Normal S1, S2] : normal S1 and S2 [Normal Rate] : normal [Rhythm Regular] : regular [I] : a grade 1 [No Carotid Bruits] : no carotid bruits [No Abdominal Bruit] : a ~M bruit was not heard ~T in the abdomen [No Varicosities] : no varicosities [No Palpable Aorta] : no palpable aorta [Pedal Pulses Present] : the pedal pulses are present [No Extremity Clubbing/Cyanosis] : no extremity clubbing/cyanosis [___ +] : bilateral [unfilled]U+ pretibial pitting edema [Soft] : abdomen soft [Non Tender] : non-tender [Non-distended] : non-distended [No Masses] : no abdominal mass palpated [No HSM] : no HSM [Normal Bowel Sounds] : normal bowel sounds [Normal Posterior Cervical Nodes] : no posterior cervical lymphadenopathy [Normal Anterior Cervical Nodes] : no anterior cervical lymphadenopathy [No CVA Tenderness] : no CVA  tenderness [No Spinal Tenderness] : no spinal tenderness [Coordination Grossly Intact] : coordination grossly intact [No Joint Swelling] : no joint swelling [Grossly Normal Strength/Tone] : grossly normal strength/tone [Normal Gait] : normal gait [No Focal Deficits] : no focal deficits [Speech Grossly Normal] : speech grossly normal [Memory Grossly Normal] : memory grossly normal [Deep Tendon Reflexes (DTR)] : deep tendon reflexes were 2+ and symmetric [Normal Affect] : the affect was normal [Alert and Oriented x3] : oriented to person, place, and time [Normal Insight/Judgement] : insight and judgment were intact [No Mass] : no mass [Stool Occult Blood] : stool negative for occult blood [de-identified] : venous insufficiency, scab left ankle unchanged  [de-identified] : cvs changes, scab left ankle

## 2020-10-14 NOTE — PLAN
[FreeTextEntry1] : Will reduce warfarin by one half pill a week\par Continue other current medications\par Check bloods\par Further GI workup only if felt to be appropriate given her age pending the blood results

## 2020-10-14 NOTE — ASSESSMENT
[FreeTextEntry1] : INR a bit high\par blood pressure good\par improved since pacemaker implantation\par skin lesion left ankle basal cell\par for RT\par change in bowel habits as above, normal rectal exam, stool negative for blood

## 2020-10-15 LAB
ALBUMIN SERPL ELPH-MCNC: 4.2 G/DL
ALP BLD-CCNC: 73 U/L
ALT SERPL-CCNC: 22 U/L
ANION GAP SERPL CALC-SCNC: 8 MMOL/L
AST SERPL-CCNC: 23 U/L
BASOPHILS # BLD AUTO: 0.05 K/UL
BASOPHILS NFR BLD AUTO: 0.7 %
BILIRUB SERPL-MCNC: 0.6 MG/DL
BUN SERPL-MCNC: 22 MG/DL
CALCIUM SERPL-MCNC: 9.1 MG/DL
CEA SERPL-MCNC: 1.6 NG/ML
CHLORIDE SERPL-SCNC: 102 MMOL/L
CO2 SERPL-SCNC: 31 MMOL/L
CREAT SERPL-MCNC: 1.05 MG/DL
EOSINOPHIL # BLD AUTO: 0.19 K/UL
EOSINOPHIL NFR BLD AUTO: 2.5 %
ESTIMATED AVERAGE GLUCOSE: 120 MG/DL
FERRITIN SERPL-MCNC: 189 NG/ML
GLUCOSE SERPL-MCNC: 104 MG/DL
HBA1C MFR BLD HPLC: 5.8 %
HCT VFR BLD CALC: 43 %
HGB BLD-MCNC: 13 G/DL
IMM GRANULOCYTES NFR BLD AUTO: 0.1 %
IRON SATN MFR SERPL: 30 %
IRON SERPL-MCNC: 86 UG/DL
LYMPHOCYTES # BLD AUTO: 2.05 K/UL
LYMPHOCYTES NFR BLD AUTO: 26.8 %
MAN DIFF?: NORMAL
MCHC RBC-ENTMCNC: 27.8 PG
MCHC RBC-ENTMCNC: 30.2 GM/DL
MCV RBC AUTO: 92.1 FL
MONOCYTES # BLD AUTO: 0.76 K/UL
MONOCYTES NFR BLD AUTO: 9.9 %
NEUTROPHILS # BLD AUTO: 4.59 K/UL
NEUTROPHILS NFR BLD AUTO: 60 %
PLATELET # BLD AUTO: 190 K/UL
POTASSIUM SERPL-SCNC: 4.7 MMOL/L
PROT SERPL-MCNC: 6.5 G/DL
RBC # BLD: 4.67 M/UL
RBC # FLD: 13.5 %
SODIUM SERPL-SCNC: 141 MMOL/L
TIBC SERPL-MCNC: 290 UG/DL
TSH SERPL-ACNC: 2.96 UIU/ML
UIBC SERPL-MCNC: 205 UG/DL
WBC # FLD AUTO: 7.65 K/UL

## 2020-11-09 ENCOUNTER — APPOINTMENT (OUTPATIENT)
Dept: INTERNAL MEDICINE | Facility: CLINIC | Age: 85
End: 2020-11-09
Payer: MEDICARE

## 2020-11-09 VITALS
HEIGHT: 60 IN | DIASTOLIC BLOOD PRESSURE: 60 MMHG | WEIGHT: 161 LBS | SYSTOLIC BLOOD PRESSURE: 130 MMHG | BODY MASS INDEX: 31.61 KG/M2

## 2020-11-09 LAB — INR PPP: 2.3 RATIO

## 2020-11-09 PROCEDURE — 99214 OFFICE O/P EST MOD 30 MIN: CPT | Mod: 25

## 2020-11-09 PROCEDURE — 85610 PROTHROMBIN TIME: CPT | Mod: QW

## 2020-11-09 NOTE — REVIEW OF SYSTEMS
[Hearing Loss] : hearing loss [Lower Ext Edema] : lower extremity edema [Joint Pain] : joint pain [Headache] : headache [Negative] : Heme/Lymph [Earache] : no earache [Hoarseness] : no hoarseness [Nasal Discharge] : no nasal discharge [Sore Throat] : no sore throat [Chest Pain] : no chest pain [Palpitations] : no palpitations [Leg Claudication] : no leg claudication [Orthopnea] : no orthopnea [Wheezing] : no wheezing [Cough] : no cough [Dyspnea on Exertion] : no dyspnea on exertion [Abdominal Pain] : no abdominal pain [Nausea] : no nausea [Constipation] : no constipation [Diarrhea] : diarrhea [Vomiting] : no vomiting [Heartburn] : no heartburn [Melena] : no melena [Joint Stiffness] : no joint stiffness [Muscle Pain] : no muscle pain [Back Pain] : no back pain [Itching] : no itching [Mole Changes] : no mole changes [Skin Rash] : no skin rash [Dizziness] : no dizziness [Confusion] : no confusion [Memory Loss] : no memory loss

## 2020-11-09 NOTE — PLAN
[FreeTextEntry1] : same warfarin\par as headaches have resolved will not pursue\par basal cell left ankle encouraged to proceed with RT\par same meds

## 2020-11-09 NOTE — HISTORY OF PRESENT ILLNESS
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\par HTN well controlled to follow today\par INR 2.3  today\par scab left ankle, saw derm, told of basal cell, to get RT\par RT postponed to patients  getting ill, I can not convince her to proceed with it\par had headaches last week , they have gone away, they were relieved by tylenol

## 2020-11-09 NOTE — ASSESSMENT
[FreeTextEntry1] : INR perfect\par blood pressure good\par improved since pacemaker implantation\par skin lesion left ankle basal cell for RT which she has postponed \par some headaches last week\par stable venous insufficiency

## 2020-11-09 NOTE — PHYSICAL EXAM
[No Acute Distress] : no acute distress [Well Nourished] : well nourished [Well Developed] : well developed [Well-Appearing] : well-appearing [Normal Sclera/Conjunctiva] : normal sclera/conjunctiva [EOMI] : extraocular movements intact [Normal Outer Ear/Nose] : the outer ears and nose were normal in appearance [Normal TMs] : both tympanic membranes were normal [No JVD] : no jugular venous distention [No Lymphadenopathy] : no lymphadenopathy [Supple] : supple [No Respiratory Distress] : no respiratory distress  [No Accessory Muscle Use] : no accessory muscle use [Clear to Auscultation] : lungs were clear to auscultation bilaterally [Regular Rhythm] : with a regular rhythm [Normal S1, S2] : normal S1 and S2 [Normal Rate] : normal [Rhythm Regular] : regular [I] : a grade 1 [No Carotid Bruits] : no carotid bruits [No Abdominal Bruit] : a ~M bruit was not heard ~T in the abdomen [No Varicosities] : no varicosities [Pedal Pulses Present] : the pedal pulses are present [No Palpable Aorta] : no palpable aorta [No Extremity Clubbing/Cyanosis] : no extremity clubbing/cyanosis [___ +] : bilateral [unfilled]U+ pretibial pitting edema [Soft] : abdomen soft [Non Tender] : non-tender [Non-distended] : non-distended [No Masses] : no abdominal mass palpated [No HSM] : no HSM [Normal Bowel Sounds] : normal bowel sounds [Normal Posterior Cervical Nodes] : no posterior cervical lymphadenopathy [Normal Anterior Cervical Nodes] : no anterior cervical lymphadenopathy [No CVA Tenderness] : no CVA  tenderness [No Spinal Tenderness] : no spinal tenderness [No Joint Swelling] : no joint swelling [Grossly Normal Strength/Tone] : grossly normal strength/tone [Coordination Grossly Intact] : coordination grossly intact [No Focal Deficits] : no focal deficits [Normal Gait] : normal gait [Deep Tendon Reflexes (DTR)] : deep tendon reflexes were 2+ and symmetric [Speech Grossly Normal] : speech grossly normal [Memory Grossly Normal] : memory grossly normal [Normal Affect] : the affect was normal [Alert and Oriented x3] : oriented to person, place, and time [Normal Insight/Judgement] : insight and judgment were intact [de-identified] : no sinus tenderness [de-identified] : venous insufficiency, basal cell left ankle unchanged  [de-identified] : cvs changes, scab left ankle

## 2020-12-02 ENCOUNTER — APPOINTMENT (OUTPATIENT)
Dept: INTERNAL MEDICINE | Facility: CLINIC | Age: 85
End: 2020-12-02
Payer: MEDICARE

## 2020-12-02 VITALS
SYSTOLIC BLOOD PRESSURE: 146 MMHG | DIASTOLIC BLOOD PRESSURE: 66 MMHG | WEIGHT: 161 LBS | HEART RATE: 73 BPM | BODY MASS INDEX: 31.61 KG/M2 | HEIGHT: 60 IN

## 2020-12-02 VITALS — SYSTOLIC BLOOD PRESSURE: 118 MMHG | DIASTOLIC BLOOD PRESSURE: 66 MMHG

## 2020-12-02 DIAGNOSIS — I82.409 ACUTE EMBOLISM AND THROMBOSIS OF UNSPECIFIED DEEP VEINS OF UNSPECIFIED LOWER EXTREMITY: ICD-10-CM

## 2020-12-02 DIAGNOSIS — Z86.711 PERSONAL HISTORY OF PULMONARY EMBOLISM: ICD-10-CM

## 2020-12-02 LAB — INR PPP: 2.1 RATIO

## 2020-12-02 PROCEDURE — 99214 OFFICE O/P EST MOD 30 MIN: CPT | Mod: 25

## 2020-12-02 PROCEDURE — 85610 PROTHROMBIN TIME: CPT | Mod: QW

## 2020-12-02 NOTE — HISTORY OF PRESENT ILLNESS
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

## 2020-12-02 NOTE — ASSESSMENT
[FreeTextEntry1] : INR in range\par blood pressure well controlled\par improved since pacemaker implantation\par left ankle basal cell for RT which she has postponed \par no further headaches \par stable venous insufficiency

## 2020-12-02 NOTE — PHYSICAL EXAM
[No Acute Distress] : no acute distress [Well Nourished] : well nourished [Well Developed] : well developed [Well-Appearing] : well-appearing [Normal Sclera/Conjunctiva] : normal sclera/conjunctiva [Normal Outer Ear/Nose] : the outer ears and nose were normal in appearance [No JVD] : no jugular venous distention [No Lymphadenopathy] : no lymphadenopathy [Supple] : supple [No Respiratory Distress] : no respiratory distress  [No Accessory Muscle Use] : no accessory muscle use [Clear to Auscultation] : lungs were clear to auscultation bilaterally [Regular Rhythm] : with a regular rhythm [Normal S1, S2] : normal S1 and S2 [Normal Rate] : normal [Rhythm Regular] : regular [I] : a grade 1 [No Carotid Bruits] : no carotid bruits [No Abdominal Bruit] : a ~M bruit was not heard ~T in the abdomen [No Varicosities] : no varicosities [Pedal Pulses Present] : the pedal pulses are present [No Palpable Aorta] : no palpable aorta [No Extremity Clubbing/Cyanosis] : no extremity clubbing/cyanosis [___ +] : bilateral [unfilled]U+ pretibial pitting edema [Soft] : abdomen soft [Non Tender] : non-tender [Non-distended] : non-distended [No Masses] : no abdominal mass palpated [No HSM] : no HSM [Normal Bowel Sounds] : normal bowel sounds [Normal Posterior Cervical Nodes] : no posterior cervical lymphadenopathy [Normal Anterior Cervical Nodes] : no anterior cervical lymphadenopathy [No CVA Tenderness] : no CVA  tenderness [No Spinal Tenderness] : no spinal tenderness [No Joint Swelling] : no joint swelling [Grossly Normal Strength/Tone] : grossly normal strength/tone [Coordination Grossly Intact] : coordination grossly intact [No Focal Deficits] : no focal deficits [Normal Gait] : normal gait [Deep Tendon Reflexes (DTR)] : deep tendon reflexes were 2+ and symmetric [Speech Grossly Normal] : speech grossly normal [Memory Grossly Normal] : memory grossly normal [Normal Affect] : the affect was normal [Alert and Oriented x3] : oriented to person, place, and time [Normal Insight/Judgement] : insight and judgment were intact [de-identified] : venous insufficiency, basal cell left ankle unchanged  [de-identified] : cvs changes, scab left ankle

## 2020-12-10 ENCOUNTER — APPOINTMENT (OUTPATIENT)
Dept: ELECTROPHYSIOLOGY | Facility: CLINIC | Age: 85
End: 2020-12-10
Payer: MEDICARE

## 2020-12-10 VITALS
HEIGHT: 60 IN | SYSTOLIC BLOOD PRESSURE: 175 MMHG | HEART RATE: 67 BPM | OXYGEN SATURATION: 99 % | WEIGHT: 161 LBS | BODY MASS INDEX: 31.61 KG/M2 | DIASTOLIC BLOOD PRESSURE: 76 MMHG

## 2020-12-10 PROCEDURE — 93280 PM DEVICE PROGR EVAL DUAL: CPT

## 2021-03-10 ENCOUNTER — APPOINTMENT (OUTPATIENT)
Dept: ELECTROPHYSIOLOGY | Facility: CLINIC | Age: 86
End: 2021-03-10
Payer: MEDICARE

## 2021-03-10 ENCOUNTER — NON-APPOINTMENT (OUTPATIENT)
Age: 86
End: 2021-03-10

## 2021-03-10 PROCEDURE — 93296 REM INTERROG EVL PM/IDS: CPT

## 2021-03-10 PROCEDURE — 93294 REM INTERROG EVL PM/LDLS PM: CPT

## 2021-03-18 RX ORDER — TRIAMCINOLONE ACETONIDE 1 MG/G
0.1 OINTMENT TOPICAL
Qty: 1 | Refills: 0 | Status: ACTIVE | COMMUNITY
Start: 2021-03-18 | End: 1900-01-01

## 2021-04-15 ENCOUNTER — APPOINTMENT (OUTPATIENT)
Dept: INTERNAL MEDICINE | Facility: CLINIC | Age: 86
End: 2021-04-15
Payer: MEDICARE

## 2021-04-15 VITALS
HEART RATE: 58 BPM | OXYGEN SATURATION: 96 % | SYSTOLIC BLOOD PRESSURE: 120 MMHG | DIASTOLIC BLOOD PRESSURE: 62 MMHG | TEMPERATURE: 97.9 F | WEIGHT: 160 LBS | BODY MASS INDEX: 31.25 KG/M2

## 2021-04-15 DIAGNOSIS — R00.1 BRADYCARDIA, UNSPECIFIED: ICD-10-CM

## 2021-04-15 PROCEDURE — 99213 OFFICE O/P EST LOW 20 MIN: CPT

## 2021-04-15 NOTE — HISTORY OF PRESENT ILLNESS
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

## 2021-04-15 NOTE — ASSESSMENT
[FreeTextEntry1] : INR in range on home draws\par blood pressure well controlled\par improved since pacemaker implantation\par left ankle basal cell  RT complete\par nasal basal cell RT with minor right nosebleeds from RT, site of bleeding right at RT site on exam\par mild dyspnea on exertion, would not evaluate at this time with underlying conditions\par stable venous insufficiency

## 2021-06-09 ENCOUNTER — NON-APPOINTMENT (OUTPATIENT)
Age: 86
End: 2021-06-09

## 2021-06-09 ENCOUNTER — APPOINTMENT (OUTPATIENT)
Dept: ELECTROPHYSIOLOGY | Facility: CLINIC | Age: 86
End: 2021-06-09
Payer: MEDICARE

## 2021-06-09 PROCEDURE — 93296 REM INTERROG EVL PM/IDS: CPT

## 2021-06-09 PROCEDURE — 93294 REM INTERROG EVL PM/LDLS PM: CPT

## 2021-09-07 ENCOUNTER — RX RENEWAL (OUTPATIENT)
Age: 86
End: 2021-09-07

## 2021-09-08 ENCOUNTER — NON-APPOINTMENT (OUTPATIENT)
Age: 86
End: 2021-09-08

## 2021-09-08 ENCOUNTER — APPOINTMENT (OUTPATIENT)
Dept: ELECTROPHYSIOLOGY | Facility: CLINIC | Age: 86
End: 2021-09-08
Payer: MEDICARE

## 2021-09-08 PROCEDURE — 93296 REM INTERROG EVL PM/IDS: CPT

## 2021-09-08 PROCEDURE — 93294 REM INTERROG EVL PM/LDLS PM: CPT

## 2021-09-29 ENCOUNTER — APPOINTMENT (OUTPATIENT)
Dept: INTERNAL MEDICINE | Facility: CLINIC | Age: 86
End: 2021-09-29
Payer: MEDICARE

## 2021-09-29 ENCOUNTER — NON-APPOINTMENT (OUTPATIENT)
Age: 86
End: 2021-09-29

## 2021-09-29 VITALS
BODY MASS INDEX: 31.8 KG/M2 | HEART RATE: 60 BPM | SYSTOLIC BLOOD PRESSURE: 144 MMHG | WEIGHT: 162 LBS | DIASTOLIC BLOOD PRESSURE: 73 MMHG | HEIGHT: 60 IN

## 2021-09-29 VITALS — DIASTOLIC BLOOD PRESSURE: 66 MMHG | SYSTOLIC BLOOD PRESSURE: 130 MMHG

## 2021-09-29 LAB — INR PPP: 1.8 RATIO

## 2021-09-29 PROCEDURE — 93000 ELECTROCARDIOGRAM COMPLETE: CPT | Mod: 59

## 2021-09-29 PROCEDURE — 36415 COLL VENOUS BLD VENIPUNCTURE: CPT

## 2021-09-29 PROCEDURE — G0444 DEPRESSION SCREEN ANNUAL: CPT

## 2021-09-29 PROCEDURE — G0439: CPT

## 2021-09-29 PROCEDURE — 85610 PROTHROMBIN TIME: CPT | Mod: QW

## 2021-09-29 NOTE — HISTORY OF PRESENT ILLNESS
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

## 2021-09-29 NOTE — ASSESSMENT
[FreeTextEntry1] : INR just low\par blood pressure well controlled\par improved since pacemaker implantation\par left ankle basal cell  RT complete\par chronic venous insufficiency\par squamous cell right arm

## 2021-09-29 NOTE — PHYSICAL EXAM
[Well Nourished] : well nourished [Well Developed] : well developed [Well-Appearing] : well-appearing [Normal Sclera/Conjunctiva] : normal sclera/conjunctiva [Normal Outer Ear/Nose] : the outer ears and nose were normal in appearance [No JVD] : no jugular venous distention [No Lymphadenopathy] : no lymphadenopathy [Supple] : supple [No Respiratory Distress] : no respiratory distress  [No Accessory Muscle Use] : no accessory muscle use [Clear to Auscultation] : lungs were clear to auscultation bilaterally [Regular Rhythm] : with a regular rhythm [Normal S1, S2] : normal S1 and S2 [Normal Rate] : normal [Rhythm Regular] : regular [I] : a grade 1 [No Carotid Bruits] : no carotid bruits [No Abdominal Bruit] : a ~M bruit was not heard ~T in the abdomen [No Varicosities] : no varicosities [Pedal Pulses Present] : the pedal pulses are present [No Palpable Aorta] : no palpable aorta [No Extremity Clubbing/Cyanosis] : no extremity clubbing/cyanosis [___ +] : bilateral [unfilled]U+ pretibial pitting edema [Normal Appearance] : normal in appearance [No Nipple Discharge] : no nipple discharge [No Axillary Lymphadenopathy] : no axillary lymphadenopathy [Soft] : abdomen soft [Non Tender] : non-tender [Non-distended] : non-distended [No Masses] : no abdominal mass palpated [No HSM] : no HSM [Normal Bowel Sounds] : normal bowel sounds [Normal Posterior Cervical Nodes] : no posterior cervical lymphadenopathy [Normal Anterior Cervical Nodes] : no anterior cervical lymphadenopathy [No CVA Tenderness] : no CVA  tenderness [No Spinal Tenderness] : no spinal tenderness [No Joint Swelling] : no joint swelling [Grossly Normal Strength/Tone] : grossly normal strength/tone [Coordination Grossly Intact] : coordination grossly intact [No Focal Deficits] : no focal deficits [Normal Gait] : normal gait [Deep Tendon Reflexes (DTR)] : deep tendon reflexes were 2+ and symmetric [Speech Grossly Normal] : speech grossly normal [Memory Grossly Normal] : memory grossly normal [Normal Affect] : the affect was normal [Alert and Oriented x3] : oriented to person, place, and time [Normal Insight/Judgement] : insight and judgment were intact [de-identified] : venous insufficiency edema and lymphedema [de-identified] : cvs changes, scab left ankle

## 2021-09-30 DIAGNOSIS — M16.10 UNILATERAL PRIMARY OSTEOARTHRITIS, UNSPECIFIED HIP: ICD-10-CM

## 2021-09-30 LAB
25(OH)D3 SERPL-MCNC: 43.9 NG/ML
ALBUMIN SERPL ELPH-MCNC: 4.4 G/DL
ALP BLD-CCNC: 73 U/L
ALT SERPL-CCNC: 17 U/L
ANION GAP SERPL CALC-SCNC: 9 MMOL/L
AST SERPL-CCNC: 21 U/L
BASOPHILS # BLD AUTO: 0.04 K/UL
BASOPHILS NFR BLD AUTO: 0.7 %
BILIRUB SERPL-MCNC: 0.7 MG/DL
BUN SERPL-MCNC: 23 MG/DL
CALCIUM SERPL-MCNC: 9.4 MG/DL
CHLORIDE SERPL-SCNC: 104 MMOL/L
CHOLEST SERPL-MCNC: 155 MG/DL
CK SERPL-CCNC: 107 U/L
CO2 SERPL-SCNC: 31 MMOL/L
CREAT SERPL-MCNC: 1.08 MG/DL
EOSINOPHIL # BLD AUTO: 0.09 K/UL
EOSINOPHIL NFR BLD AUTO: 1.6 %
ESTIMATED AVERAGE GLUCOSE: 117 MG/DL
GLUCOSE SERPL-MCNC: 91 MG/DL
HBA1C MFR BLD HPLC: 5.7 %
HCT VFR BLD CALC: 43.4 %
HDLC SERPL-MCNC: 75 MG/DL
HGB BLD-MCNC: 13.1 G/DL
IMM GRANULOCYTES NFR BLD AUTO: 0.2 %
LDLC SERPL CALC-MCNC: 69 MG/DL
LYMPHOCYTES # BLD AUTO: 1.44 K/UL
LYMPHOCYTES NFR BLD AUTO: 24.8 %
MAN DIFF?: NORMAL
MCHC RBC-ENTMCNC: 27.3 PG
MCHC RBC-ENTMCNC: 30.2 GM/DL
MCV RBC AUTO: 90.4 FL
MONOCYTES # BLD AUTO: 0.49 K/UL
MONOCYTES NFR BLD AUTO: 8.4 %
NEUTROPHILS # BLD AUTO: 3.73 K/UL
NEUTROPHILS NFR BLD AUTO: 64.3 %
NONHDLC SERPL-MCNC: 80 MG/DL
PLATELET # BLD AUTO: 180 K/UL
POTASSIUM SERPL-SCNC: 4.4 MMOL/L
PROT SERPL-MCNC: 6.9 G/DL
RBC # BLD: 4.8 M/UL
RBC # FLD: 13.5 %
SODIUM SERPL-SCNC: 144 MMOL/L
T4 FREE SERPL-MCNC: 1.2 NG/DL
TRIGL SERPL-MCNC: 55 MG/DL
TSH SERPL-ACNC: 2.48 UIU/ML
VIT B12 SERPL-MCNC: 389 PG/ML
WBC # FLD AUTO: 5.8 K/UL

## 2021-10-11 ENCOUNTER — NON-APPOINTMENT (OUTPATIENT)
Age: 86
End: 2021-10-11

## 2021-10-11 VITALS
WEIGHT: 160 LBS | HEIGHT: 60 IN | BODY MASS INDEX: 31.41 KG/M2 | SYSTOLIC BLOOD PRESSURE: 130 MMHG | DIASTOLIC BLOOD PRESSURE: 66 MMHG

## 2021-10-11 NOTE — HISTORY OF PRESENT ILLNESS
[Implantable Device/Pacemaker] : implantable device/pacemaker [Chronic Anticoagulation] : chronic anticoagulation [(Patient denies any chest pain, claudication, dyspnea on exertion, orthopnea, palpitations or syncope)] : Patient denies any chest pain, claudication, dyspnea on exertion, orthopnea, palpitations or syncope [Aortic Stenosis] : no aortic stenosis [Atrial Fibrillation] : no atrial fibrillation [Coronary Artery Disease] : no coronary artery disease [Recent Myocardial Infarction] : no recent myocardial infarction [Asthma] : no asthma [COPD] : no COPD [Sleep Apnea] : no sleep apnea [Smoker] : not a smoker [Family Member] : no family member with adverse anesthesia reaction/sudden death [Self] : no previous adverse anesthesia reaction [Chronic Kidney Disease] : no chronic kidney disease [Diabetes] : no diabetes [Moderate (4-6 METs)] : Moderate (4-6 METs) [FreeTextEntry1] : right forearm squamous cell removal [FreeTextEntry2] : 10/13/2021 [FreeTextEntry3] : Wyatt Harkins [FreeTextEntry4] : Squamous cell on her forearm for removal\par Past history of pulmonary embolus and deep vein thrombosis on lifelong warfarin which will be held preoperatively\par Last nuclear stress test October 2016 showed no ischemia\par Status post permanent pacemaker implantation dual-chamber December 2019\par No history of MI and no coronary artery disease\par Hypertension well controlled on medications\par Stable edema and lymphedema for many years

## 2021-10-11 NOTE — ASSESSMENT
[Patient Optimized for Surgery] : Patient optimized for surgery [No Further Testing Recommended] : no further testing recommended [Modify anticoagulant treatment prior to procedure] : Modify anticoagulant treatment prior to procedure [Continue anti-platelet treatment as is] : Continue current anti-platelet treatment [Continue medications as is] : Continue current medications [FreeTextEntry4] : Patient is an acceptable condition for surgery on right forearm as well as any necessary anesthesia\par Dual-chamber pacemaker present\par Warfarin held preoperatively [FreeTextEntry5] : held 5 days

## 2021-10-11 NOTE — PHYSICAL EXAM
[Well Nourished] : well nourished [Well Developed] : well developed [Well-Appearing] : well-appearing [Normal Sclera/Conjunctiva] : normal sclera/conjunctiva [Normal Outer Ear/Nose] : the outer ears and nose were normal in appearance [No JVD] : no jugular venous distention [No Lymphadenopathy] : no lymphadenopathy [Supple] : supple [No Respiratory Distress] : no respiratory distress  [No Accessory Muscle Use] : no accessory muscle use [Clear to Auscultation] : lungs were clear to auscultation bilaterally [Regular Rhythm] : with a regular rhythm [Normal S1, S2] : normal S1 and S2 [Normal Rate] : normal [Rhythm Regular] : regular [I] : a grade 1 [No Carotid Bruits] : no carotid bruits [No Abdominal Bruit] : a ~M bruit was not heard ~T in the abdomen [No Varicosities] : no varicosities [Pedal Pulses Present] : the pedal pulses are present [No Palpable Aorta] : no palpable aorta [No Extremity Clubbing/Cyanosis] : no extremity clubbing/cyanosis [___ +] : bilateral [unfilled]U+ pretibial pitting edema [Normal Appearance] : normal in appearance [No Nipple Discharge] : no nipple discharge [No Axillary Lymphadenopathy] : no axillary lymphadenopathy [Soft] : abdomen soft [Non Tender] : non-tender [Non-distended] : non-distended [No Masses] : no abdominal mass palpated [No HSM] : no HSM [Normal Bowel Sounds] : normal bowel sounds [Normal Posterior Cervical Nodes] : no posterior cervical lymphadenopathy [Normal Anterior Cervical Nodes] : no anterior cervical lymphadenopathy [No CVA Tenderness] : no CVA  tenderness [No Spinal Tenderness] : no spinal tenderness [No Joint Swelling] : no joint swelling [Grossly Normal Strength/Tone] : grossly normal strength/tone [Coordination Grossly Intact] : coordination grossly intact [No Focal Deficits] : no focal deficits [Normal Gait] : normal gait [Deep Tendon Reflexes (DTR)] : deep tendon reflexes were 2+ and symmetric [Speech Grossly Normal] : speech grossly normal [Memory Grossly Normal] : memory grossly normal [Normal Affect] : the affect was normal [Alert and Oriented x3] : oriented to person, place, and time [Normal Insight/Judgement] : insight and judgment were intact [de-identified] : venous insufficiency edema and lymphedema [de-identified] : cvs changes, scab left ankle

## 2021-12-08 ENCOUNTER — RESULT CHARGE (OUTPATIENT)
Age: 86
End: 2021-12-08

## 2021-12-09 ENCOUNTER — APPOINTMENT (OUTPATIENT)
Dept: ELECTROPHYSIOLOGY | Facility: CLINIC | Age: 86
End: 2021-12-09
Payer: MEDICARE

## 2021-12-09 ENCOUNTER — NON-APPOINTMENT (OUTPATIENT)
Age: 86
End: 2021-12-09

## 2021-12-09 VITALS
OXYGEN SATURATION: 98 % | BODY MASS INDEX: 31.8 KG/M2 | HEIGHT: 60 IN | SYSTOLIC BLOOD PRESSURE: 173 MMHG | WEIGHT: 162 LBS | HEART RATE: 64 BPM | DIASTOLIC BLOOD PRESSURE: 77 MMHG

## 2021-12-09 PROCEDURE — 93280 PM DEVICE PROGR EVAL DUAL: CPT

## 2021-12-09 PROCEDURE — 93000 ELECTROCARDIOGRAM COMPLETE: CPT | Mod: 59

## 2021-12-20 ENCOUNTER — RESULT CHARGE (OUTPATIENT)
Age: 86
End: 2021-12-20

## 2021-12-20 ENCOUNTER — APPOINTMENT (OUTPATIENT)
Dept: INTERNAL MEDICINE | Facility: CLINIC | Age: 86
End: 2021-12-20
Payer: MEDICARE

## 2021-12-20 VITALS
HEIGHT: 60 IN | DIASTOLIC BLOOD PRESSURE: 78 MMHG | BODY MASS INDEX: 32.39 KG/M2 | WEIGHT: 165 LBS | SYSTOLIC BLOOD PRESSURE: 130 MMHG

## 2021-12-20 VITALS — TEMPERATURE: 98.1 F

## 2021-12-20 DIAGNOSIS — I20.8 OTHER FORMS OF ANGINA PECTORIS: ICD-10-CM

## 2021-12-20 DIAGNOSIS — Z23 ENCOUNTER FOR IMMUNIZATION: ICD-10-CM

## 2021-12-20 LAB — INR PPP: 3.1 RATIO

## 2021-12-20 PROCEDURE — 85610 PROTHROMBIN TIME: CPT | Mod: QW

## 2021-12-20 PROCEDURE — 99214 OFFICE O/P EST MOD 30 MIN: CPT | Mod: 25

## 2021-12-21 PROBLEM — I20.8 ANGINAL EQUIVALENT: Status: RESOLVED | Noted: 2019-05-02 | Resolved: 2021-12-21

## 2021-12-21 NOTE — PLAN
[FreeTextEntry1] : Agree with using antibiotic and elevation\par Change furosemide to torsemide\par Suggest vascular consult and support stockings\par Will reduce warfarin to a one half pill 3 times a week and one pill 4 times a week while on doxycycline

## 2021-12-21 NOTE — HISTORY OF PRESENT ILLNESS
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

## 2021-12-21 NOTE — ASSESSMENT
[FreeTextEntry1] : blood pressure well controlled\par chronic venous insufficiency with possible right LE cellulitis, no warmth\par s/p removal squamous cell right arm

## 2021-12-21 NOTE — PHYSICAL EXAM
[Well Nourished] : well nourished [Well Developed] : well developed [Well-Appearing] : well-appearing [Normal Sclera/Conjunctiva] : normal sclera/conjunctiva [Normal Outer Ear/Nose] : the outer ears and nose were normal in appearance [No JVD] : no jugular venous distention [No Lymphadenopathy] : no lymphadenopathy [Supple] : supple [No Respiratory Distress] : no respiratory distress  [No Accessory Muscle Use] : no accessory muscle use [Clear to Auscultation] : lungs were clear to auscultation bilaterally [Regular Rhythm] : with a regular rhythm [Normal S1, S2] : normal S1 and S2 [Normal Rate] : normal [Rhythm Regular] : regular [I] : a grade 1 [No Carotid Bruits] : no carotid bruits [No Abdominal Bruit] : a ~M bruit was not heard ~T in the abdomen [No Varicosities] : no varicosities [Pedal Pulses Present] : the pedal pulses are present [No Palpable Aorta] : no palpable aorta [No Extremity Clubbing/Cyanosis] : no extremity clubbing/cyanosis [___ +] : bilateral [unfilled]U+ pretibial pitting edema [Normal Appearance] : normal in appearance [No Nipple Discharge] : no nipple discharge [No Axillary Lymphadenopathy] : no axillary lymphadenopathy [Soft] : abdomen soft [Non Tender] : non-tender [Non-distended] : non-distended [No Masses] : no abdominal mass palpated [No HSM] : no HSM [Normal Bowel Sounds] : normal bowel sounds [Normal Posterior Cervical Nodes] : no posterior cervical lymphadenopathy [Normal Anterior Cervical Nodes] : no anterior cervical lymphadenopathy [No CVA Tenderness] : no CVA  tenderness [No Spinal Tenderness] : no spinal tenderness [No Joint Swelling] : no joint swelling [Grossly Normal Strength/Tone] : grossly normal strength/tone [Coordination Grossly Intact] : coordination grossly intact [No Focal Deficits] : no focal deficits [Normal Gait] : normal gait [Deep Tendon Reflexes (DTR)] : deep tendon reflexes were 2+ and symmetric [Speech Grossly Normal] : speech grossly normal [Memory Grossly Normal] : memory grossly normal [Normal Affect] : the affect was normal [Alert and Oriented x3] : oriented to person, place, and time [Normal Insight/Judgement] : insight and judgment were intact [de-identified] : venous insufficiency edema and lymphedema [de-identified] : cvs changes, slight increased redness without warmth RLE

## 2022-01-30 ENCOUNTER — RX RENEWAL (OUTPATIENT)
Age: 87
End: 2022-01-30

## 2022-02-07 ENCOUNTER — APPOINTMENT (OUTPATIENT)
Dept: INTERNAL MEDICINE | Facility: CLINIC | Age: 87
End: 2022-02-07
Payer: MEDICARE

## 2022-02-07 NOTE — HISTORY OF PRESENT ILLNESS
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

## 2022-02-10 ENCOUNTER — APPOINTMENT (OUTPATIENT)
Dept: INTERNAL MEDICINE | Facility: CLINIC | Age: 87
End: 2022-02-10
Payer: MEDICARE

## 2022-02-10 VITALS
DIASTOLIC BLOOD PRESSURE: 80 MMHG | BODY MASS INDEX: 32 KG/M2 | TEMPERATURE: 97.8 F | WEIGHT: 163 LBS | HEART RATE: 68 BPM | SYSTOLIC BLOOD PRESSURE: 150 MMHG | OXYGEN SATURATION: 98 % | HEIGHT: 60 IN

## 2022-02-10 VITALS — WEIGHT: 163 LBS | BODY MASS INDEX: 32 KG/M2 | HEIGHT: 60 IN

## 2022-02-10 VITALS — DIASTOLIC BLOOD PRESSURE: 74 MMHG | SYSTOLIC BLOOD PRESSURE: 118 MMHG

## 2022-02-10 DIAGNOSIS — M54.2 CERVICALGIA: ICD-10-CM

## 2022-02-10 DIAGNOSIS — D48.5 NEOPLASM OF UNCERTAIN BEHAVIOR OF SKIN: ICD-10-CM

## 2022-02-10 PROCEDURE — 99214 OFFICE O/P EST MOD 30 MIN: CPT

## 2022-02-10 RX ORDER — TORSEMIDE 10 MG/1
10 TABLET ORAL DAILY
Qty: 90 | Refills: 3 | Status: COMPLETED | COMMUNITY
Start: 2021-12-20 | End: 2022-02-10

## 2022-02-10 NOTE — PHYSICAL EXAM
[Well Nourished] : well nourished [Well Developed] : well developed [Well-Appearing] : well-appearing [Normal Sclera/Conjunctiva] : normal sclera/conjunctiva [Normal Outer Ear/Nose] : the outer ears and nose were normal in appearance [No JVD] : no jugular venous distention [No Lymphadenopathy] : no lymphadenopathy [Supple] : supple [No Respiratory Distress] : no respiratory distress  [No Accessory Muscle Use] : no accessory muscle use [Clear to Auscultation] : lungs were clear to auscultation bilaterally [Regular Rhythm] : with a regular rhythm [Normal S1, S2] : normal S1 and S2 [Normal Rate] : normal [Rhythm Regular] : regular [I] : a grade 1 [No Carotid Bruits] : no carotid bruits [No Abdominal Bruit] : a ~M bruit was not heard ~T in the abdomen [No Varicosities] : no varicosities [Pedal Pulses Present] : the pedal pulses are present [No Palpable Aorta] : no palpable aorta [No Extremity Clubbing/Cyanosis] : no extremity clubbing/cyanosis [___ +] : bilateral [unfilled]U+ pretibial pitting edema [Normal Appearance] : normal in appearance [No Nipple Discharge] : no nipple discharge [No Axillary Lymphadenopathy] : no axillary lymphadenopathy [Soft] : abdomen soft [Non Tender] : non-tender [Non-distended] : non-distended [No Masses] : no abdominal mass palpated [No HSM] : no HSM [Normal Bowel Sounds] : normal bowel sounds [Normal Posterior Cervical Nodes] : no posterior cervical lymphadenopathy [Normal Anterior Cervical Nodes] : no anterior cervical lymphadenopathy [No CVA Tenderness] : no CVA  tenderness [No Spinal Tenderness] : no spinal tenderness [No Joint Swelling] : no joint swelling [Grossly Normal Strength/Tone] : grossly normal strength/tone [Coordination Grossly Intact] : coordination grossly intact [No Focal Deficits] : no focal deficits [Normal Gait] : normal gait [Deep Tendon Reflexes (DTR)] : deep tendon reflexes were 2+ and symmetric [Speech Grossly Normal] : speech grossly normal [Memory Grossly Normal] : memory grossly normal [Normal Affect] : the affect was normal [Alert and Oriented x3] : oriented to person, place, and time [Normal Insight/Judgement] : insight and judgment were intact [de-identified] : venous insufficiency edema and lymphedema [de-identified] : cvs changes, slight increased redness without warmth RLE

## 2022-02-10 NOTE — HISTORY OF PRESENT ILLNESS
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

## 2022-03-09 ENCOUNTER — APPOINTMENT (OUTPATIENT)
Dept: ELECTROPHYSIOLOGY | Facility: CLINIC | Age: 87
End: 2022-03-09

## 2022-03-10 ENCOUNTER — NON-APPOINTMENT (OUTPATIENT)
Age: 87
End: 2022-03-10

## 2022-03-10 ENCOUNTER — APPOINTMENT (OUTPATIENT)
Dept: ELECTROPHYSIOLOGY | Facility: CLINIC | Age: 87
End: 2022-03-10
Payer: MEDICARE

## 2022-03-10 PROCEDURE — 93296 REM INTERROG EVL PM/IDS: CPT

## 2022-03-10 PROCEDURE — 93294 REM INTERROG EVL PM/LDLS PM: CPT

## 2022-05-03 ENCOUNTER — RX RENEWAL (OUTPATIENT)
Age: 87
End: 2022-05-03

## 2022-05-06 RX ORDER — GABAPENTIN 100 MG/1
100 CAPSULE ORAL 3 TIMES DAILY
Qty: 270 | Refills: 1 | Status: ACTIVE | COMMUNITY
Start: 2022-04-19 | End: 1900-01-01

## 2022-06-09 ENCOUNTER — NON-APPOINTMENT (OUTPATIENT)
Age: 87
End: 2022-06-09

## 2022-06-09 ENCOUNTER — APPOINTMENT (OUTPATIENT)
Dept: ELECTROPHYSIOLOGY | Facility: CLINIC | Age: 87
End: 2022-06-09
Payer: MEDICARE

## 2022-06-09 PROCEDURE — 93296 REM INTERROG EVL PM/IDS: CPT

## 2022-06-09 PROCEDURE — 93294 REM INTERROG EVL PM/LDLS PM: CPT

## 2022-07-14 ENCOUNTER — TRANSCRIPTION ENCOUNTER (OUTPATIENT)
Age: 87
End: 2022-07-14

## 2022-07-15 ENCOUNTER — APPOINTMENT (OUTPATIENT)
Dept: INTERNAL MEDICINE | Facility: CLINIC | Age: 87
End: 2022-07-15

## 2022-07-15 VITALS
BODY MASS INDEX: 31.8 KG/M2 | HEIGHT: 60 IN | OXYGEN SATURATION: 97 % | WEIGHT: 162 LBS | HEART RATE: 65 BPM | SYSTOLIC BLOOD PRESSURE: 182 MMHG | TEMPERATURE: 98 F | DIASTOLIC BLOOD PRESSURE: 66 MMHG

## 2022-07-15 VITALS — SYSTOLIC BLOOD PRESSURE: 128 MMHG | DIASTOLIC BLOOD PRESSURE: 76 MMHG

## 2022-07-15 VITALS — DIASTOLIC BLOOD PRESSURE: 76 MMHG | SYSTOLIC BLOOD PRESSURE: 140 MMHG

## 2022-07-15 LAB — INR PPP: 2.3 RATIO

## 2022-07-15 PROCEDURE — 85610 PROTHROMBIN TIME: CPT | Mod: QW

## 2022-07-15 PROCEDURE — 99214 OFFICE O/P EST MOD 30 MIN: CPT | Mod: 25

## 2022-07-15 PROCEDURE — 36415 COLL VENOUS BLD VENIPUNCTURE: CPT

## 2022-07-15 RX ORDER — WARFARIN 2.5 MG/1
2.5 TABLET ORAL
Qty: 30 | Refills: 11 | Status: COMPLETED | COMMUNITY
Start: 2018-11-07 | End: 2022-07-15

## 2022-07-15 RX ORDER — FLUOCINONIDE 0.5 MG/G
0.05 CREAM TOPICAL TWICE DAILY
Qty: 1 | Refills: 2 | Status: ACTIVE | COMMUNITY
Start: 2022-07-15 | End: 1900-01-01

## 2022-07-15 RX ORDER — METHYLPREDNISOLONE 4 MG/1
4 TABLET ORAL
Qty: 1 | Refills: 0 | Status: COMPLETED | COMMUNITY
Start: 2022-02-10 | End: 2022-07-15

## 2022-07-15 NOTE — HISTORY OF PRESENT ILLNESS
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

## 2022-07-15 NOTE — PHYSICAL EXAM
[Well Nourished] : well nourished [Well Developed] : well developed [Well-Appearing] : well-appearing [Normal Sclera/Conjunctiva] : normal sclera/conjunctiva [Normal Outer Ear/Nose] : the outer ears and nose were normal in appearance [No JVD] : no jugular venous distention [No Lymphadenopathy] : no lymphadenopathy [Supple] : supple [No Respiratory Distress] : no respiratory distress  [No Accessory Muscle Use] : no accessory muscle use [Clear to Auscultation] : lungs were clear to auscultation bilaterally [Regular Rhythm] : with a regular rhythm [Normal Rate] : normal [Rhythm Regular] : regular [Normal S2] : abnormal S2 [S2 Diminished] : was diminished [I] : a grade 1 [No Carotid Bruits] : no carotid bruits [No Abdominal Bruit] : a ~M bruit was not heard ~T in the abdomen [No Varicosities] : no varicosities [Pedal Pulses Present] : the pedal pulses are present [No Palpable Aorta] : no palpable aorta [No Extremity Clubbing/Cyanosis] : no extremity clubbing/cyanosis [___ +] : bilateral [unfilled]U+ pretibial pitting edema [Normal Appearance] : normal in appearance [No Nipple Discharge] : no nipple discharge [No Axillary Lymphadenopathy] : no axillary lymphadenopathy [Soft] : abdomen soft [Non Tender] : non-tender [Non-distended] : non-distended [No Masses] : no abdominal mass palpated [No HSM] : no HSM [Normal Bowel Sounds] : normal bowel sounds [Normal Posterior Cervical Nodes] : no posterior cervical lymphadenopathy [Normal Anterior Cervical Nodes] : no anterior cervical lymphadenopathy [No CVA Tenderness] : no CVA  tenderness [No Spinal Tenderness] : no spinal tenderness [No Joint Swelling] : no joint swelling [Grossly Normal Strength/Tone] : grossly normal strength/tone [Coordination Grossly Intact] : coordination grossly intact [No Focal Deficits] : no focal deficits [Normal Gait] : normal gait [Deep Tendon Reflexes (DTR)] : deep tendon reflexes were 2+ and symmetric [Speech Grossly Normal] : speech grossly normal [Memory Grossly Normal] : memory grossly normal [Normal Affect] : the affect was normal [Alert and Oriented x3] : oriented to person, place, and time [Normal Insight/Judgement] : insight and judgment were intact [de-identified] : venous insufficiency edema and lymphedema, some scaly lesions [de-identified] : cvs changes, slight increased redness without warmth RLE

## 2022-07-15 NOTE — ASSESSMENT
[FreeTextEntry1] : blood pressure borderline\par chronic venous insufficiency\par s/p removal squamous cell right arm\par suspect pain from arthritis\par declines follow up with vascular

## 2022-07-18 LAB
ALBUMIN SERPL ELPH-MCNC: 4.1 G/DL
ALP BLD-CCNC: 79 U/L
ALT SERPL-CCNC: 17 U/L
ANION GAP SERPL CALC-SCNC: 11 MMOL/L
AST SERPL-CCNC: 20 U/L
BASOPHILS # BLD AUTO: 0.04 K/UL
BASOPHILS NFR BLD AUTO: 0.7 %
BILIRUB SERPL-MCNC: 0.9 MG/DL
BUN SERPL-MCNC: 23 MG/DL
CALCIUM SERPL-MCNC: 8.9 MG/DL
CHLORIDE SERPL-SCNC: 108 MMOL/L
CO2 SERPL-SCNC: 24 MMOL/L
CREAT SERPL-MCNC: 1.08 MG/DL
EGFR: 48 ML/MIN/1.73M2
EOSINOPHIL # BLD AUTO: 0.11 K/UL
EOSINOPHIL NFR BLD AUTO: 2.1 %
ESTIMATED AVERAGE GLUCOSE: 126 MG/DL
GLUCOSE SERPL-MCNC: 98 MG/DL
HBA1C MFR BLD HPLC: 6 %
HCT VFR BLD CALC: 40.4 %
HGB BLD-MCNC: 12.3 G/DL
IMM GRANULOCYTES NFR BLD AUTO: 0.2 %
LYMPHOCYTES # BLD AUTO: 1.45 K/UL
LYMPHOCYTES NFR BLD AUTO: 27.1 %
MAN DIFF?: NORMAL
MCHC RBC-ENTMCNC: 26.9 PG
MCHC RBC-ENTMCNC: 30.4 GM/DL
MCV RBC AUTO: 88.4 FL
MONOCYTES # BLD AUTO: 0.39 K/UL
MONOCYTES NFR BLD AUTO: 7.3 %
NEUTROPHILS # BLD AUTO: 3.36 K/UL
NEUTROPHILS NFR BLD AUTO: 62.6 %
NT-PROBNP SERPL-MCNC: 874 PG/ML
PLATELET # BLD AUTO: 166 K/UL
POTASSIUM SERPL-SCNC: 4.4 MMOL/L
PROT SERPL-MCNC: 6.3 G/DL
RBC # BLD: 4.57 M/UL
RBC # FLD: 14 %
SODIUM SERPL-SCNC: 144 MMOL/L
TSH SERPL-ACNC: 2.44 UIU/ML
WBC # FLD AUTO: 5.36 K/UL

## 2022-07-26 ENCOUNTER — TRANSCRIPTION ENCOUNTER (OUTPATIENT)
Age: 87
End: 2022-07-26

## 2022-08-12 ENCOUNTER — TRANSCRIPTION ENCOUNTER (OUTPATIENT)
Age: 87
End: 2022-08-12

## 2022-08-12 ENCOUNTER — EMERGENCY (EMERGENCY)
Facility: HOSPITAL | Age: 87
LOS: 1 days | Discharge: HOME | End: 2022-08-12
Attending: EMERGENCY MEDICINE
Payer: MEDICARE

## 2022-08-12 VITALS
RESPIRATION RATE: 17 BRPM | OXYGEN SATURATION: 96 % | HEIGHT: 60 IN | DIASTOLIC BLOOD PRESSURE: 87 MMHG | SYSTOLIC BLOOD PRESSURE: 160 MMHG | HEART RATE: 59 BPM | TEMPERATURE: 98 F | WEIGHT: 160.06 LBS

## 2022-08-12 VITALS
DIASTOLIC BLOOD PRESSURE: 75 MMHG | RESPIRATION RATE: 18 BRPM | OXYGEN SATURATION: 99 % | SYSTOLIC BLOOD PRESSURE: 185 MMHG | HEART RATE: 62 BPM

## 2022-08-12 PROCEDURE — 71045 X-RAY EXAM CHEST 1 VIEW: CPT | Mod: 26,59

## 2022-08-12 PROCEDURE — 93010 ELECTROCARDIOGRAM REPORT: CPT

## 2022-08-12 PROCEDURE — 99285 EMERGENCY DEPT VISIT HI MDM: CPT | Mod: GC

## 2022-08-12 PROCEDURE — 71046 X-RAY EXAM CHEST 2 VIEWS: CPT | Mod: 26

## 2022-08-12 PROCEDURE — 99284 EMERGENCY DEPT VISIT MOD MDM: CPT | Mod: GC

## 2022-08-12 NOTE — ED ADULT TRIAGE NOTE - CHIEF COMPLAINT QUOTE
"twinging sensation" over pacemaker site since 230pm  denies chest tightness/pressure, SOB & back pain

## 2022-08-13 ENCOUNTER — TRANSCRIPTION ENCOUNTER (OUTPATIENT)
Age: 87
End: 2022-08-13

## 2022-08-13 LAB
ALBUMIN SERPL ELPH-MCNC: 4.2 G/DL — SIGNIFICANT CHANGE UP (ref 3.3–5)
ALP SERPL-CCNC: 80 U/L — SIGNIFICANT CHANGE UP (ref 40–120)
ALT FLD-CCNC: 17 U/L — SIGNIFICANT CHANGE UP (ref 10–45)
ANION GAP SERPL CALC-SCNC: 12 MMOL/L — SIGNIFICANT CHANGE UP (ref 5–17)
AST SERPL-CCNC: 21 U/L — SIGNIFICANT CHANGE UP (ref 10–40)
BASOPHILS # BLD AUTO: 0.04 K/UL — SIGNIFICANT CHANGE UP (ref 0–0.2)
BASOPHILS NFR BLD AUTO: 0.6 % — SIGNIFICANT CHANGE UP (ref 0–2)
BILIRUB SERPL-MCNC: 0.8 MG/DL — SIGNIFICANT CHANGE UP (ref 0.2–1.2)
BUN SERPL-MCNC: 25 MG/DL — HIGH (ref 7–23)
CALCIUM SERPL-MCNC: 9.5 MG/DL — SIGNIFICANT CHANGE UP (ref 8.4–10.5)
CHLORIDE SERPL-SCNC: 103 MMOL/L — SIGNIFICANT CHANGE UP (ref 96–108)
CO2 SERPL-SCNC: 27 MMOL/L — SIGNIFICANT CHANGE UP (ref 22–31)
CREAT SERPL-MCNC: 1.14 MG/DL — SIGNIFICANT CHANGE UP (ref 0.5–1.3)
EGFR: 45 ML/MIN/1.73M2 — LOW
EOSINOPHIL # BLD AUTO: 0.13 K/UL — SIGNIFICANT CHANGE UP (ref 0–0.5)
EOSINOPHIL NFR BLD AUTO: 2.1 % — SIGNIFICANT CHANGE UP (ref 0–6)
GLUCOSE SERPL-MCNC: 90 MG/DL — SIGNIFICANT CHANGE UP (ref 70–99)
HCT VFR BLD CALC: 42.1 % — SIGNIFICANT CHANGE UP (ref 34.5–45)
HGB BLD-MCNC: 12.8 G/DL — SIGNIFICANT CHANGE UP (ref 11.5–15.5)
IMM GRANULOCYTES NFR BLD AUTO: 0.2 % — SIGNIFICANT CHANGE UP (ref 0–1.5)
LYMPHOCYTES # BLD AUTO: 1.94 K/UL — SIGNIFICANT CHANGE UP (ref 1–3.3)
LYMPHOCYTES # BLD AUTO: 30.6 % — SIGNIFICANT CHANGE UP (ref 13–44)
MAGNESIUM SERPL-MCNC: 2.1 MG/DL — SIGNIFICANT CHANGE UP (ref 1.6–2.6)
MCHC RBC-ENTMCNC: 26.8 PG — LOW (ref 27–34)
MCHC RBC-ENTMCNC: 30.4 GM/DL — LOW (ref 32–36)
MCV RBC AUTO: 88.3 FL — SIGNIFICANT CHANGE UP (ref 80–100)
MONOCYTES # BLD AUTO: 0.53 K/UL — SIGNIFICANT CHANGE UP (ref 0–0.9)
MONOCYTES NFR BLD AUTO: 8.4 % — SIGNIFICANT CHANGE UP (ref 2–14)
NEUTROPHILS # BLD AUTO: 3.69 K/UL — SIGNIFICANT CHANGE UP (ref 1.8–7.4)
NEUTROPHILS NFR BLD AUTO: 58.1 % — SIGNIFICANT CHANGE UP (ref 43–77)
NRBC # BLD: 0 /100 WBCS — SIGNIFICANT CHANGE UP (ref 0–0)
NT-PROBNP SERPL-SCNC: 1085 PG/ML — HIGH (ref 0–300)
PHOSPHATE SERPL-MCNC: 3 MG/DL — SIGNIFICANT CHANGE UP (ref 2.5–4.5)
PLATELET # BLD AUTO: 151 K/UL — SIGNIFICANT CHANGE UP (ref 150–400)
POTASSIUM SERPL-MCNC: 3.9 MMOL/L — SIGNIFICANT CHANGE UP (ref 3.5–5.3)
POTASSIUM SERPL-SCNC: 3.9 MMOL/L — SIGNIFICANT CHANGE UP (ref 3.5–5.3)
PROT SERPL-MCNC: 7.1 G/DL — SIGNIFICANT CHANGE UP (ref 6–8.3)
RBC # BLD: 4.77 M/UL — SIGNIFICANT CHANGE UP (ref 3.8–5.2)
RBC # FLD: 13.9 % — SIGNIFICANT CHANGE UP (ref 10.3–14.5)
SODIUM SERPL-SCNC: 142 MMOL/L — SIGNIFICANT CHANGE UP (ref 135–145)
TROPONIN T, HIGH SENSITIVITY RESULT: 34 NG/L — SIGNIFICANT CHANGE UP (ref 0–51)
WBC # BLD: 6.34 K/UL — SIGNIFICANT CHANGE UP (ref 3.8–10.5)
WBC # FLD AUTO: 6.34 K/UL — SIGNIFICANT CHANGE UP (ref 3.8–10.5)

## 2022-08-13 PROCEDURE — 93005 ELECTROCARDIOGRAM TRACING: CPT

## 2022-08-13 PROCEDURE — 84100 ASSAY OF PHOSPHORUS: CPT

## 2022-08-13 PROCEDURE — 71045 X-RAY EXAM CHEST 1 VIEW: CPT

## 2022-08-13 PROCEDURE — 85025 COMPLETE CBC W/AUTO DIFF WBC: CPT

## 2022-08-13 PROCEDURE — 84484 ASSAY OF TROPONIN QUANT: CPT

## 2022-08-13 PROCEDURE — 80053 COMPREHEN METABOLIC PANEL: CPT

## 2022-08-13 PROCEDURE — 83880 ASSAY OF NATRIURETIC PEPTIDE: CPT

## 2022-08-13 PROCEDURE — 83735 ASSAY OF MAGNESIUM: CPT

## 2022-08-13 PROCEDURE — 71046 X-RAY EXAM CHEST 2 VIEWS: CPT

## 2022-08-13 PROCEDURE — 99285 EMERGENCY DEPT VISIT HI MDM: CPT | Mod: 25

## 2022-08-13 PROCEDURE — 36415 COLL VENOUS BLD VENIPUNCTURE: CPT

## 2022-08-13 NOTE — ED PROVIDER NOTE - PATIENT PORTAL LINK FT
You can access the FollowMyHealth Patient Portal offered by [facility] by registering at the following website: [email or web address]. By joining [employer]’s FollowMyHealth portal, you will also be able to view your health information using other applications (apps) compatible with our system.

## 2022-08-13 NOTE — ED ADULT NURSE NOTE - CAS ED AMA REASON YN
[Dear  ___] : Dear  [unfilled], [Consult Letter:] : I had the pleasure of evaluating your patient, [unfilled]. [Please see my note below.] : Please see my note below. [Consult Closing:] : Thank you very much for allowing me to participate in the care of this patient.  If you have any questions, please do not hesitate to contact me. [Sincerely,] : Sincerely, [FreeTextEntry3] : Nahun Guido MD\par tel: [phone]\par fax: [phone]\par  Yes

## 2022-08-13 NOTE — ED ADULT NURSE NOTE - OBJECTIVE STATEMENT
89 y.o F PMH third degree heart block ,presenting to the ED for "twinge" on left side of chest where pacemaker is placed. Pt states that 89 y.o F PMH third degree heart block ,presenting to the ED for "twinge" on left side of chest where pacemaker is placed. Pt states that the sensation is intermittent. Denies c/p, sob assocated with the sensation. AOx3, MAEx4, distal pulses strong and regular, abd soft nontender/nondistended, skin warm to touch. Bilat LE swollen and red. Pt and pt son states that this is normal for the pt and the legs do not look abnormal for her baseline. Denies headache, blurry vision, dizziness, weakness, headache, blurry vision. Pt placed on cardiac monitor, large bore IV in place. Bed in lowest position, wheels locked, appropriate side rails up. Safety maintained, comfort provided.

## 2022-08-13 NOTE — ED PROVIDER NOTE - ATTENDING CONTRIBUTION TO CARE
89F hx of PPM placement in 2019 for intermittent CHB, pAflutter here with c/o L chest wall "twinges" near PPM site. State last less than a second and resolve, happened several times since 1430 today. No other assoc sx of SOB, dizziness, syncope, abd pain, nv. Pt reporting she has felt the sensation here in ED as well while on tele monitoring, no correlating events noted. Pt is ApVp on her EKG. Normal mentation, perfusion and bp. PPM site is CDI, no erythema or skin breakdown, no swelling. Will check CXR to eval leads. Consult EP, check screening labs, lytes.

## 2022-08-13 NOTE — CONSULT NOTE ADULT - ATTENDING SUPERVISION STATEMENT
----- Message from Sha Ortega MD sent at 6/30/2021  7:38 AM CDT -----  Iron level is normal.   Inflammatory marker normal.   Folic acid normal.   LDH (test for lymphoma) normal.   Thyroid test is normal.   Flow cytometry (lymphoma test) still pending.   
Voicemail not available  
Fellow

## 2022-08-13 NOTE — ED PROVIDER NOTE - NSFOLLOWUPINSTRUCTIONS_ED_ALL_ED_FT
Please follow up with your Electrophysiology provider Dr. Younger on 08/16/2022 to monitor your symptoms.     Please come back to the Emergency Room if your symptoms get worse.

## 2022-08-13 NOTE — CONSULT NOTE ADULT - SUBJECTIVE AND OBJECTIVE BOX
Patient seen and evaluated at bedside  Consult question: Pacemaker pocket evaluation.    Brief HPI:  89F w/ HTN, DM, SSS w/ 6-7 sec pauses and Mobitz 1 s/p PPM ([place]ott, Nov 2019), who presented to the hospital with twinging sensation at the pacemaker site that lasts less than 1 second. It happened all sudden aroudn 2:30 PM, it has recurred without any inciting event or trigger, but frquently continued to decline. No CP, palpitation, SOB, visual disturbance, lightheadedness, or dizziness. Patient called outpt office and adviced to come into hospital. Patient reports not playing with the pacemaker.    Upon evaluation, patient remains asymptomatic, sitting on chair calm. She wishes to go home rather than staying til AM to see EP team. Son at bedside also agrees.    PMH:   DM (Diabetes Mellitus)    Hypertension    Hypercholesterolemia      PSH:   History of Cholecystectomy    H/O: Hysterectomy      Medications:     Allergies:  No Known Allergies        Review of Systems:  Constitutional: [ ] Fever [ ] Chills [ ] Fatigue [ ] Weight change   HEENT: [ ] Blurred vision [ ] Eye Pain [ ] Headache [ ] Runny nose [ ] Sore Throat   Respiratory: [ ] Cough [ ] Wheezing [ ] Shortness of breath  Cardiovascular: [ ] Chest Pain [ ] Palpitations [ ] BETANCUR [ ] PND [ ] Orthopnea [x] pacemaker site tingling  Gastrointestinal: [ ] Abdominal Pain [ ] Diarrhea [ ] Constipation [ ] Hemorrhoids [ ] Nausea [ ] Vomiting  Genitourinary: [ ] Nocturia [ ] Dysuria [ ] Incontinence  Extremities: [ ] Swelling [ ] Joint Pain  Neurologic: [ ] Focal deficit [ ] Paresthesias [ ] Syncope  Lymphatic: [ ] Swelling [ ] Lymphadenopathy   Skin: [ ] Rash [ ] Ecchymoses [ ] Wounds [ ] Lesions  Psychiatry: [ ] Depression [ ] Suicidal/Homicidal Ideation [ ] Anxiety [ ] Sleep Disturbances  [ ] 10 point review of systems is otherwise negative except as mentioned above            [ ]Unable to obtain    Physical Exam:  T(C): 36.8 (08-12-22 @ 19:55), Max: 36.8 (08-12-22 @ 19:55)  HR: 62 (08-12-22 @ 23:05) (59 - 62)  BP: 185/75 (08-12-22 @ 23:05) (160/87 - 185/75)  RR: 18 (08-12-22 @ 23:05) (17 - 18)  SpO2: 99% (08-12-22 @ 23:05) (96% - 99%)  Wt(kg): --    Daily Height in cm: 152.4 (12 Aug 2022 19:55)    Daily     Constitutional: NAD.   HEENT: AT/NC, EOMI, Supple neck;  Respiratory: no wheezing or crackles. no increase in WOB  Cardiovascular: RRR, S1, S2, no M/R/G. 2+ distal pulses., b/l LE edema and UE edema.  Gastrointestinal: soft; NT/ND, +BS  Extremities: no cyanosis; non-tender to palpation, DP and Radial pulses intact.  Neurological: A&Ox 3;  Psychiatric: normal mood/affect.  Pacemaker site: c/d/i, no erythema or swelling. not ttp.    Cardiovascular Diagnostic Testing:  ECG: ApVp  Tele: ApVp    Labs:

## 2022-08-13 NOTE — ED PROVIDER NOTE - PROGRESS NOTE DETAILS
Evelia PGY2: nursing notified that patient would like to leave. Writer spoke with patient who states that she wants to leave and would not want to wait for her lab results. EP already cleared patient to be discharged with outpatient follow up with Dr. Younger. However, labs were still pending. Patient will be ama'ed as the patient wishes to be discharged against medical advice. I have assessed the patient's mental status and  the patient has capacity to make this decision. I have explained the risks of leaving without full treatment, including severe disability and death, which the patient understands and is willing to accept. I have answered all of the patient's questions. I reiterated my medical opinion and advised the patient to return at any time. We discussed the further workup outside of the current visit and return precautions.

## 2022-08-13 NOTE — ED PROVIDER NOTE - PHYSICAL EXAMINATION
Gen: Patient is well-appearing, NAD, AAOx3, able to ambulate without assistance  HEENT: NCAT, normal conjunctiva, tongue midline, oral mucosa moist  Lung: CTAB, no respiratory distress, no wheezes/rhonchi/rales B/L, speaking in full sentences  CV: RRR, no murmurs, rubs or gallops, distal pulses 2+ b/l  Abd: soft, NT, ND, no guarding, no rigidity, no rebound tenderness   MSK: no visible deformities, ROM normal in UE/LE  Neuro: No focal sensory or motor deficits  Skin: Warm, well perfused, no rash, bilateral leg swelling (chronic per patient)   Psych: normal affect, calm

## 2022-08-13 NOTE — ED PROVIDER NOTE - OBJECTIVE STATEMENT
89 y F, hx of third degree heart block, on pacemaker, now presenting with 1-day onset of pacemaker site twingling sensation. Denies chest pain, shortness of breath, abdominal pain, fever, passing out.

## 2022-08-13 NOTE — ED PROVIDER NOTE - WR ORDER STATUS 1
Discharge instructions with daughter rishi.  Pt sent home with walker.  Return demonstration with use of walker.     Resulted

## 2022-08-13 NOTE — CONSULT NOTE ADULT - ATTENDING COMMENTS
89F w/ HTN, DM, SSS w/ 6-7 sec pauses and Mobitz 1 s/p PPM ([place], Nov 2019), who presented to the hospital with twinging sensation at the pacemaker site that lasts less than 1 second. Pacemaker site looks c/d/i with no signs of infection.

## 2022-08-15 ENCOUNTER — NON-APPOINTMENT (OUTPATIENT)
Age: 87
End: 2022-08-15

## 2022-08-15 ENCOUNTER — APPOINTMENT (OUTPATIENT)
Dept: ELECTROPHYSIOLOGY | Facility: CLINIC | Age: 87
End: 2022-08-15

## 2022-08-15 VITALS — DIASTOLIC BLOOD PRESSURE: 64 MMHG | SYSTOLIC BLOOD PRESSURE: 157 MMHG | HEART RATE: 60 BPM | OXYGEN SATURATION: 99 %

## 2022-08-15 PROCEDURE — 93000 ELECTROCARDIOGRAM COMPLETE: CPT | Mod: 59

## 2022-08-15 PROCEDURE — 93280 PM DEVICE PROGR EVAL DUAL: CPT

## 2022-08-18 ENCOUNTER — LABORATORY RESULT (OUTPATIENT)
Age: 87
End: 2022-08-18

## 2022-09-04 ENCOUNTER — TRANSCRIPTION ENCOUNTER (OUTPATIENT)
Age: 87
End: 2022-09-04

## 2022-09-06 ENCOUNTER — APPOINTMENT (OUTPATIENT)
Dept: INTERNAL MEDICINE | Facility: CLINIC | Age: 87
End: 2022-09-06

## 2022-09-06 VITALS
WEIGHT: 162 LBS | BODY MASS INDEX: 31.64 KG/M2 | OXYGEN SATURATION: 98 % | SYSTOLIC BLOOD PRESSURE: 154 MMHG | DIASTOLIC BLOOD PRESSURE: 67 MMHG | HEART RATE: 68 BPM | TEMPERATURE: 98 F

## 2022-09-06 PROCEDURE — 36415 COLL VENOUS BLD VENIPUNCTURE: CPT

## 2022-09-06 PROCEDURE — 99214 OFFICE O/P EST MOD 30 MIN: CPT | Mod: 25

## 2022-09-06 NOTE — ASSESSMENT
[FreeTextEntry1] : left leg cellulitis not yet improving on clindamycin, not worsening, no fevers\par not elevating leg\par chronic venous insufficiency\par s/p removal squamous cell right arm\par

## 2022-09-06 NOTE — REVIEW OF SYSTEMS
[Fever] : no fever [Chills] : no chills [Fatigue] : no fatigue [Night Sweats] : no night sweats [Earache] : no earache [Hearing Loss] : hearing loss [Hoarseness] : no hoarseness [Nasal Discharge] : no nasal discharge [Sore Throat] : no sore throat [Chest Pain] : no chest pain [Palpitations] : no palpitations [Leg Claudication] : no leg claudication [Lower Ext Edema] : lower extremity edema [Orthopnea] : no orthopnea [Wheezing] : no wheezing [Cough] : no cough [Dyspnea on Exertion] : no dyspnea on exertion [Abdominal Pain] : no abdominal pain [Nausea] : no nausea [Constipation] : no constipation [Diarrhea] : diarrhea [Vomiting] : no vomiting [Heartburn] : no heartburn [Melena] : no melena [Joint Pain] : joint pain [Joint Stiffness] : no joint stiffness [Muscle Pain] : no muscle pain [Back Pain] : no back pain [Itching] : no itching [Mole Changes] : no mole changes [Headache] : headache [Dizziness] : no dizziness [Confusion] : no confusion [Memory Loss] : no memory loss [Negative] : Heme/Lymph [de-identified] : see HPI

## 2022-09-06 NOTE — PLAN
[FreeTextEntry1] : check bloods\par elevate elevate elavate higher than heart \par explained in detail to patient and son\par she will not improve without elevation\par ? change antibiotics\par no streaking\par hospitalize if streaking, fevers, or fails to improve

## 2022-09-06 NOTE — HISTORY OF PRESENT ILLNESS
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

## 2022-09-06 NOTE — PHYSICAL EXAM
[Well Nourished] : well nourished [Well Developed] : well developed [Well-Appearing] : well-appearing [Normal Sclera/Conjunctiva] : normal sclera/conjunctiva [Normal Outer Ear/Nose] : the outer ears and nose were normal in appearance [No JVD] : no jugular venous distention [No Lymphadenopathy] : no lymphadenopathy [Supple] : supple [No Respiratory Distress] : no respiratory distress  [No Accessory Muscle Use] : no accessory muscle use [Clear to Auscultation] : lungs were clear to auscultation bilaterally [Regular Rhythm] : with a regular rhythm [Normal Rate] : normal [Rhythm Regular] : regular [S2 Diminished] : was diminished [I] : a grade 1 [No Carotid Bruits] : no carotid bruits [No Abdominal Bruit] : a ~M bruit was not heard ~T in the abdomen [Pedal Pulses Present] : the pedal pulses are present [No Palpable Aorta] : no palpable aorta [No Extremity Clubbing/Cyanosis] : no extremity clubbing/cyanosis [___ +] : bilateral [unfilled]U+ pretibial pitting edema [Normal Appearance] : normal in appearance [No Nipple Discharge] : no nipple discharge [No Axillary Lymphadenopathy] : no axillary lymphadenopathy [Soft] : abdomen soft [Non Tender] : non-tender [Non-distended] : non-distended [No Masses] : no abdominal mass palpated [No HSM] : no HSM [Normal Bowel Sounds] : normal bowel sounds [Normal Posterior Cervical Nodes] : no posterior cervical lymphadenopathy [Normal Anterior Cervical Nodes] : no anterior cervical lymphadenopathy [No CVA Tenderness] : no CVA  tenderness [No Spinal Tenderness] : no spinal tenderness [No Joint Swelling] : no joint swelling [Grossly Normal Strength/Tone] : grossly normal strength/tone [Coordination Grossly Intact] : coordination grossly intact [No Focal Deficits] : no focal deficits [Normal Gait] : normal gait [Deep Tendon Reflexes (DTR)] : deep tendon reflexes were 2+ and symmetric [Speech Grossly Normal] : speech grossly normal [Memory Grossly Normal] : memory grossly normal [Normal Affect] : the affect was normal [Alert and Oriented x3] : oriented to person, place, and time [Normal Insight/Judgement] : insight and judgment were intact [Normal S2] : abnormal S2 [de-identified] : venous insufficiency edema and lymphedema, some scaly lesions [de-identified] : cvs changes,cellulitis left leg and foot

## 2022-09-07 LAB
ANION GAP SERPL CALC-SCNC: 10 MMOL/L
BASOPHILS # BLD AUTO: 0.02 K/UL
BASOPHILS NFR BLD AUTO: 0.3 %
BUN SERPL-MCNC: 26 MG/DL
CALCIUM SERPL-MCNC: 9 MG/DL
CHLORIDE SERPL-SCNC: 104 MMOL/L
CO2 SERPL-SCNC: 27 MMOL/L
CREAT SERPL-MCNC: 1.21 MG/DL
EGFR: 42 ML/MIN/1.73M2
EOSINOPHIL # BLD AUTO: 0.05 K/UL
EOSINOPHIL NFR BLD AUTO: 0.8 %
ESTIMATED AVERAGE GLUCOSE: 126 MG/DL
GLUCOSE SERPL-MCNC: 123 MG/DL
HBA1C MFR BLD HPLC: 6 %
HCT VFR BLD CALC: 38.2 %
HGB BLD-MCNC: 11.9 G/DL
IMM GRANULOCYTES NFR BLD AUTO: 0.2 %
LYMPHOCYTES # BLD AUTO: 1.07 K/UL
LYMPHOCYTES NFR BLD AUTO: 16.3 %
MAN DIFF?: NORMAL
MCHC RBC-ENTMCNC: 27.2 PG
MCHC RBC-ENTMCNC: 31.2 GM/DL
MCV RBC AUTO: 87.4 FL
MONOCYTES # BLD AUTO: 0.56 K/UL
MONOCYTES NFR BLD AUTO: 8.5 %
NEUTROPHILS # BLD AUTO: 4.87 K/UL
NEUTROPHILS NFR BLD AUTO: 73.9 %
PLATELET # BLD AUTO: 158 K/UL
POTASSIUM SERPL-SCNC: 4.7 MMOL/L
RBC # BLD: 4.37 M/UL
RBC # FLD: 13.9 %
SODIUM SERPL-SCNC: 141 MMOL/L
WBC # FLD AUTO: 6.58 K/UL

## 2022-09-08 ENCOUNTER — APPOINTMENT (OUTPATIENT)
Dept: INTERNAL MEDICINE | Facility: CLINIC | Age: 87
End: 2022-09-08

## 2022-09-08 ENCOUNTER — APPOINTMENT (OUTPATIENT)
Dept: ELECTROPHYSIOLOGY | Facility: CLINIC | Age: 87
End: 2022-09-08

## 2022-09-08 VITALS
SYSTOLIC BLOOD PRESSURE: 130 MMHG | WEIGHT: 162 LBS | HEIGHT: 60 IN | DIASTOLIC BLOOD PRESSURE: 76 MMHG | BODY MASS INDEX: 31.8 KG/M2 | TEMPERATURE: 98.2 F

## 2022-09-08 LAB
INR PPP: 3.4 RATIO
POCT-PROTHROMBIN TIME: 41 SECS
QUALITY CONTROL: YES

## 2022-09-08 PROCEDURE — 99214 OFFICE O/P EST MOD 30 MIN: CPT | Mod: 25

## 2022-09-08 PROCEDURE — 85610 PROTHROMBIN TIME: CPT | Mod: QW

## 2022-09-08 RX ORDER — CLINDAMYCIN HYDROCHLORIDE 300 MG/1
300 CAPSULE ORAL
Qty: 40 | Refills: 0 | Status: COMPLETED | COMMUNITY
Start: 2022-09-04

## 2022-09-08 RX ORDER — AMOXICILLIN AND CLAVULANATE POTASSIUM 875; 125 MG/1; MG/1
875-125 TABLET, COATED ORAL
Qty: 20 | Refills: 0 | Status: COMPLETED | COMMUNITY
Start: 2022-09-08 | End: 2022-09-08

## 2022-09-08 NOTE — ASSESSMENT
[FreeTextEntry1] : left leg cellulitis on clindamycin, not worsening, no fevers\par not elevating leg\par chronic venous insufficiency\par s/p removal squamous cell right arm\par on warfarin, INR 3.4\par

## 2022-09-08 NOTE — HISTORY OF PRESENT ILLNESS
[FreeTextEntry1] : cellulitis [de-identified] : was setting table 9/2/22, clean knife dropped and cut front of left shin, small break in skin, oozed for a while\par 9/3/22 painful burning started on shin\par was in pain dropped and cut front of left shin, small break in skin, oozed for a while\par 9/3/22 painful burning started on shin\par 9/4 22 went to urgent care, told of cellulitis, started on 300 mg clindamycin QID\par seen here 9/6/22 elevation urged\par WBC normal\par today not worse ? 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

## 2022-09-08 NOTE — PLAN
[FreeTextEntry1] : again explained need for elevation above heart as much as possible\par change to doxy and Augmentin\par reduce warfarin to 1/2 pill daily \par RV 4 days

## 2022-09-09 ENCOUNTER — APPOINTMENT (OUTPATIENT)
Dept: ELECTROPHYSIOLOGY | Facility: CLINIC | Age: 87
End: 2022-09-09

## 2022-09-12 ENCOUNTER — APPOINTMENT (OUTPATIENT)
Dept: INTERNAL MEDICINE | Facility: CLINIC | Age: 87
End: 2022-09-12

## 2022-09-12 LAB — INR PPP: 2.4 RATIO

## 2022-09-12 PROCEDURE — 85610 PROTHROMBIN TIME: CPT | Mod: QW

## 2022-09-12 PROCEDURE — 99214 OFFICE O/P EST MOD 30 MIN: CPT | Mod: 25

## 2022-09-12 NOTE — ASSESSMENT
[FreeTextEntry1] : left leg cellulitis initially on clindamycin, now on Augmentin and doxycycline\par marked improvement clinically\par chronic venous insufficiency\par s/p removal squamous cell right arm\par on warfarin, INR 2.4 on lower dose\par slight loose stool today

## 2022-09-12 NOTE — PHYSICAL EXAM
[Well Nourished] : well nourished [Well Developed] : well developed [Well-Appearing] : well-appearing [Normal Sclera/Conjunctiva] : normal sclera/conjunctiva [Normal Outer Ear/Nose] : the outer ears and nose were normal in appearance [No JVD] : no jugular venous distention [No Lymphadenopathy] : no lymphadenopathy [Supple] : supple [No Respiratory Distress] : no respiratory distress  [No Accessory Muscle Use] : no accessory muscle use [Clear to Auscultation] : lungs were clear to auscultation bilaterally [Regular Rhythm] : with a regular rhythm [Normal Rate] : normal [Rhythm Regular] : regular [S2 Diminished] : was diminished [I] : a grade 1 [No Carotid Bruits] : no carotid bruits [No Abdominal Bruit] : a ~M bruit was not heard ~T in the abdomen [Pedal Pulses Present] : the pedal pulses are present [No Palpable Aorta] : no palpable aorta [No Extremity Clubbing/Cyanosis] : no extremity clubbing/cyanosis [___ +] : bilateral [unfilled]U+ pretibial pitting edema [Normal Appearance] : normal in appearance [No Nipple Discharge] : no nipple discharge [No Axillary Lymphadenopathy] : no axillary lymphadenopathy [Soft] : abdomen soft [Non Tender] : non-tender [Non-distended] : non-distended [No Masses] : no abdominal mass palpated [No HSM] : no HSM [Normal Bowel Sounds] : normal bowel sounds [Normal Posterior Cervical Nodes] : no posterior cervical lymphadenopathy [Normal Anterior Cervical Nodes] : no anterior cervical lymphadenopathy [No CVA Tenderness] : no CVA  tenderness [No Spinal Tenderness] : no spinal tenderness [No Joint Swelling] : no joint swelling [Grossly Normal Strength/Tone] : grossly normal strength/tone [Coordination Grossly Intact] : coordination grossly intact [No Focal Deficits] : no focal deficits [Normal Gait] : normal gait [Deep Tendon Reflexes (DTR)] : deep tendon reflexes were 2+ and symmetric [Speech Grossly Normal] : speech grossly normal [Memory Grossly Normal] : memory grossly normal [Normal Affect] : the affect was normal [Alert and Oriented x3] : oriented to person, place, and time [Normal Insight/Judgement] : insight and judgment were intact [Normal S2] : abnormal S2 [de-identified] : venous insufficiency edema and lymphedema, some scaly lesions [de-identified] : cvs changes,cellulitis left leg and foot much better since 9/9/2022

## 2022-09-12 NOTE — PLAN
[FreeTextEntry1] : continue elevate higher than heart \par explained in detail to patient and son\par \par RV 3 days

## 2022-09-12 NOTE — HISTORY OF PRESENT ILLNESS
[FreeTextEntry1] : cellulitis, venous insufficiency, on anticoagulation [de-identified] : was setting table 9/2/22, clean knife dropped and cut front of left shin, small break in skin, oozed for a while\par 9/3/22 painful burning started on shin\par was in pain dropped and cut front of left shin, small break in skin, oozed for a while\par 9/3/22 painful burning started on shin\par 9/4 22 went to urgent care, told of cellulitis, started on 300 mg clindamycin QID\par seen here 9/6/22 elevation urged\par WBC normal\par on last visit had discussion with pat and son, not elevating properly, legs were never above heart\par s/p PPI\par 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\par nuclear stress test October 2016 showed normal left ventricular ejection fraction and no ischemia done at [facility]\par HTN well controlled \par INR followed with home draws\par basal cells and other skin cancers in past, had radiation left leg\par excellent result s/p squamous cell right forearm  \par venous insufficiency and edema/lymphedema seeing vascular Xenophontos\par didn’t like torsemide, back on 20 mg pill furosemide\par INR up last visit, warfarin reduced, antibiotics changed to Augmentin and doxycycline here for f/u\par patient notices improvement\par less painful\par slight diarrhea

## 2022-09-15 ENCOUNTER — APPOINTMENT (OUTPATIENT)
Dept: INTERNAL MEDICINE | Facility: CLINIC | Age: 87
End: 2022-09-15

## 2022-09-15 VITALS
DIASTOLIC BLOOD PRESSURE: 72 MMHG | SYSTOLIC BLOOD PRESSURE: 128 MMHG | BODY MASS INDEX: 31.8 KG/M2 | WEIGHT: 162 LBS | HEIGHT: 60 IN

## 2022-09-15 LAB — INR PPP: 2.2 RATIO

## 2022-09-15 PROCEDURE — 99214 OFFICE O/P EST MOD 30 MIN: CPT | Mod: 25

## 2022-09-15 PROCEDURE — 85610 PROTHROMBIN TIME: CPT | Mod: QW

## 2022-09-15 NOTE — PHYSICAL EXAM
[Well Nourished] : well nourished [Well Developed] : well developed [Well-Appearing] : well-appearing [Normal Sclera/Conjunctiva] : normal sclera/conjunctiva [Normal Outer Ear/Nose] : the outer ears and nose were normal in appearance [No JVD] : no jugular venous distention [No Lymphadenopathy] : no lymphadenopathy [Supple] : supple [No Respiratory Distress] : no respiratory distress  [No Accessory Muscle Use] : no accessory muscle use [Clear to Auscultation] : lungs were clear to auscultation bilaterally [Regular Rhythm] : with a regular rhythm [Normal Rate] : normal [Rhythm Regular] : regular [S2 Diminished] : was diminished [I] : a grade 1 [No Carotid Bruits] : no carotid bruits [No Abdominal Bruit] : a ~M bruit was not heard ~T in the abdomen [Pedal Pulses Present] : the pedal pulses are present [No Palpable Aorta] : no palpable aorta [No Extremity Clubbing/Cyanosis] : no extremity clubbing/cyanosis [___ +] : bilateral [unfilled]U+ pretibial pitting edema [Normal Appearance] : normal in appearance [No Nipple Discharge] : no nipple discharge [No Axillary Lymphadenopathy] : no axillary lymphadenopathy [Soft] : abdomen soft [Non Tender] : non-tender [Non-distended] : non-distended [No Masses] : no abdominal mass palpated [No HSM] : no HSM [Normal Bowel Sounds] : normal bowel sounds [Normal Posterior Cervical Nodes] : no posterior cervical lymphadenopathy [Normal Anterior Cervical Nodes] : no anterior cervical lymphadenopathy [No CVA Tenderness] : no CVA  tenderness [No Spinal Tenderness] : no spinal tenderness [No Joint Swelling] : no joint swelling [Grossly Normal Strength/Tone] : grossly normal strength/tone [Coordination Grossly Intact] : coordination grossly intact [No Focal Deficits] : no focal deficits [Normal Gait] : normal gait [Deep Tendon Reflexes (DTR)] : deep tendon reflexes were 2+ and symmetric [Speech Grossly Normal] : speech grossly normal [Memory Grossly Normal] : memory grossly normal [Normal Affect] : the affect was normal [Alert and Oriented x3] : oriented to person, place, and time [Normal Insight/Judgement] : insight and judgment were intact [Normal S2] : abnormal S2 [de-identified] : venous insufficiency edema and lymphedema, some scaly lesions [de-identified] : cvs changes,cellulitis left leg gone

## 2022-09-15 NOTE — HISTORY OF PRESENT ILLNESS
[FreeTextEntry1] : here to follow conditions [de-identified] : was setting table 9/2/22, clean knife dropped and cut front of left shin, small break in skin, oozed for a while\par 9/3/22 painful burning started on shin\par was in pain dropped and cut front of left shin, small break in skin, oozed for a while\par 9/3/22 painful burning started on shin\par 9/4 22 went to urgent care, told of cellulitis, started on 300 mg clindamycin QID\par seen here 9/6/22 elevation urged\par WBC normal\par on prior visit had discussion with pat and son, not elevating properly, legs were never above heart\par s/p PPI\par 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\par HTN well controlled \par INR followed with home draws\par basal cells and other skin cancers in past, had radiation left leg\par excellent result s/p squamous cell right forearm  \par venous insufficiency and edema/lymphedema seeing vascular Xenophontos\par didn't like torsemide, back on 20 mg pill furosemide\par INR up , warfarin reduced, antibiotics changed to Augmentin and doxycycline , nice improvement\par given whole 1/2 1/2 last 3 days\par \par INR 2.2 today\par slight diarrhea mentioned last visit has resolved

## 2022-09-15 NOTE — ASSESSMENT
[FreeTextEntry1] : left leg cellulitis initially on clindamycin, now on Augmentin and doxycycline marked improvement clinically ? gone\par no diarrhea\par chronic venous insufficiency\par s/p removal squamous cell right arm\par on warfarin, INR 2.2 on lower dose\par no diarrhea

## 2022-09-15 NOTE — PLAN
[FreeTextEntry1] : 2 more days antibiotics\par then return to prior warfarin\par elevation\par precautions to prevent infection reviewed\par bloods next week

## 2022-09-22 ENCOUNTER — LABORATORY RESULT (OUTPATIENT)
Age: 87
End: 2022-09-22

## 2022-09-29 ENCOUNTER — TRANSCRIPTION ENCOUNTER (OUTPATIENT)
Age: 87
End: 2022-09-29

## 2022-10-20 ENCOUNTER — LABORATORY RESULT (OUTPATIENT)
Age: 87
End: 2022-10-20

## 2022-11-09 ENCOUNTER — NON-APPOINTMENT (OUTPATIENT)
Age: 87
End: 2022-11-09

## 2022-11-09 ENCOUNTER — APPOINTMENT (OUTPATIENT)
Dept: INTERNAL MEDICINE | Facility: CLINIC | Age: 87
End: 2022-11-09

## 2022-11-09 VITALS
SYSTOLIC BLOOD PRESSURE: 171 MMHG | WEIGHT: 155 LBS | OXYGEN SATURATION: 99 % | HEIGHT: 60 IN | BODY MASS INDEX: 30.43 KG/M2 | TEMPERATURE: 98 F | HEART RATE: 65 BPM | DIASTOLIC BLOOD PRESSURE: 72 MMHG

## 2022-11-09 VITALS — DIASTOLIC BLOOD PRESSURE: 74 MMHG | SYSTOLIC BLOOD PRESSURE: 126 MMHG

## 2022-11-09 DIAGNOSIS — M79.2 NEURALGIA AND NEURITIS, UNSPECIFIED: ICD-10-CM

## 2022-11-09 DIAGNOSIS — M19.90 UNSPECIFIED OSTEOARTHRITIS, UNSPECIFIED SITE: ICD-10-CM

## 2022-11-09 PROCEDURE — 99214 OFFICE O/P EST MOD 30 MIN: CPT

## 2022-11-09 NOTE — PHYSICAL EXAM
[Well Nourished] : well nourished [Well Developed] : well developed [Well-Appearing] : well-appearing [Normal Sclera/Conjunctiva] : normal sclera/conjunctiva [Normal Outer Ear/Nose] : the outer ears and nose were normal in appearance [No JVD] : no jugular venous distention [No Lymphadenopathy] : no lymphadenopathy [Supple] : supple [No Respiratory Distress] : no respiratory distress  [No Accessory Muscle Use] : no accessory muscle use [Clear to Auscultation] : lungs were clear to auscultation bilaterally [Regular Rhythm] : with a regular rhythm [Normal Rate] : normal [Rhythm Regular] : regular [S2 Diminished] : was diminished [I] : a grade 1 [No Carotid Bruits] : no carotid bruits [No Abdominal Bruit] : a ~M bruit was not heard ~T in the abdomen [Pedal Pulses Present] : the pedal pulses are present [No Palpable Aorta] : no palpable aorta [No Extremity Clubbing/Cyanosis] : no extremity clubbing/cyanosis [___ +] : bilateral [unfilled]U+ pretibial pitting edema [Normal Appearance] : normal in appearance [No Nipple Discharge] : no nipple discharge [No Axillary Lymphadenopathy] : no axillary lymphadenopathy [Soft] : abdomen soft [Non Tender] : non-tender [Non-distended] : non-distended [No Masses] : no abdominal mass palpated [No HSM] : no HSM [Normal Bowel Sounds] : normal bowel sounds [Normal Posterior Cervical Nodes] : no posterior cervical lymphadenopathy [Normal Anterior Cervical Nodes] : no anterior cervical lymphadenopathy [No CVA Tenderness] : no CVA  tenderness [No Spinal Tenderness] : no spinal tenderness [No Joint Swelling] : no joint swelling [Grossly Normal Strength/Tone] : grossly normal strength/tone [Coordination Grossly Intact] : coordination grossly intact [No Focal Deficits] : no focal deficits [Normal Gait] : normal gait [Deep Tendon Reflexes (DTR)] : deep tendon reflexes were 2+ and symmetric [Speech Grossly Normal] : speech grossly normal [Memory Grossly Normal] : memory grossly normal [Normal Affect] : the affect was normal [Alert and Oriented x3] : oriented to person, place, and time [Normal Insight/Judgement] : insight and judgment were intact [Normal S2] : abnormal S2 [de-identified] : venous insufficiency edema and lymphedema, stasis dermatitis [de-identified] : cvs changes,cellulitis left leg gone

## 2022-11-09 NOTE — ASSESSMENT
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

## 2022-11-09 NOTE — HISTORY OF PRESENT ILLNESS
[FreeTextEntry1] : here to follow conditions [de-identified] : came in for headache that has resolved since 11/7/22 when I spoke to son\par posterior, right\par relieved by Tylenol ES\par likely arthritis\par also concerned over legs\par was setting table 9/2/22, clean knife dropped and cut front of left shin, small break in skin, oozed for a while\par 9/3/22 painful burning started on shin\par was in pain dropped and cut front of left shin, small break in skin, oozed for a while\par 9/3/22 painful burning started on shin\par 9/4 22 went to urgent care, told of cellulitis, started on 300 mg clindamycin QID\par seen here 9/6/22 elevation urged\par WBC normal\par on prior visit had discussion with pat and son, not elevating properly, legs were never above heart\par s/p PPI\par 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\par nuclear stress test October 2016 showed normal left ventricular ejection fraction and no ischemia done at [facility]\par HTN well controlled \par INR followed with home draws\par basal cells and other skin cancers in past, had radiation left leg\par excellent result s/p squamous cell right forearm  \par venous insufficiency and edema/lymphedema seeing vascular Xenophontos\par didn't like torsemide, back on 20 mg pill furosemide\par INR up , warfarin reduced, antibiotics changed to Augmentin and doxycycline , nice improvement\par given whole 1/2 1/2 last 3 days\par \par INR 2.34 recently

## 2022-11-09 NOTE — PLAN
[FreeTextEntry1] : prn tylenol\par elevation\par precautions to prevent infection reviewed\par numerous questions from patient and son answered\par

## 2022-11-15 ENCOUNTER — APPOINTMENT (OUTPATIENT)
Dept: ELECTROPHYSIOLOGY | Facility: CLINIC | Age: 87
End: 2022-11-15

## 2022-11-15 ENCOUNTER — NON-APPOINTMENT (OUTPATIENT)
Age: 87
End: 2022-11-15

## 2022-11-15 ENCOUNTER — LABORATORY RESULT (OUTPATIENT)
Age: 87
End: 2022-11-15

## 2022-11-15 PROCEDURE — 93296 REM INTERROG EVL PM/IDS: CPT

## 2022-11-15 PROCEDURE — 93294 REM INTERROG EVL PM/LDLS PM: CPT

## 2022-11-16 ENCOUNTER — LABORATORY RESULT (OUTPATIENT)
Age: 87
End: 2022-11-16

## 2022-12-05 ENCOUNTER — APPOINTMENT (OUTPATIENT)
Dept: ELECTROPHYSIOLOGY | Facility: CLINIC | Age: 87
End: 2022-12-05

## 2022-12-06 ENCOUNTER — APPOINTMENT (OUTPATIENT)
Dept: INTERNAL MEDICINE | Facility: CLINIC | Age: 87
End: 2022-12-06

## 2022-12-06 ENCOUNTER — NON-APPOINTMENT (OUTPATIENT)
Age: 87
End: 2022-12-06

## 2022-12-06 VITALS
OXYGEN SATURATION: 98 % | WEIGHT: 155 LBS | HEART RATE: 59 BPM | HEIGHT: 60 IN | SYSTOLIC BLOOD PRESSURE: 142 MMHG | DIASTOLIC BLOOD PRESSURE: 56 MMHG | BODY MASS INDEX: 30.43 KG/M2

## 2022-12-06 VITALS — DIASTOLIC BLOOD PRESSURE: 60 MMHG | SYSTOLIC BLOOD PRESSURE: 132 MMHG

## 2022-12-06 DIAGNOSIS — R51.9 HEADACHE, UNSPECIFIED: ICD-10-CM

## 2022-12-06 LAB — INR PPP: 2.4 RATIO

## 2022-12-06 PROCEDURE — 99214 OFFICE O/P EST MOD 30 MIN: CPT | Mod: 25

## 2022-12-06 PROCEDURE — 93000 ELECTROCARDIOGRAM COMPLETE: CPT

## 2022-12-06 PROCEDURE — 85610 PROTHROMBIN TIME: CPT | Mod: QW

## 2022-12-06 RX ORDER — AMOXICILLIN AND CLAVULANATE POTASSIUM 500; 125 MG/1; MG/1
500-125 TABLET, FILM COATED ORAL
Qty: 20 | Refills: 0 | Status: COMPLETED | COMMUNITY
Start: 2022-09-08 | End: 2022-12-06

## 2022-12-06 RX ORDER — FUROSEMIDE 20 MG/1
20 TABLET ORAL
Qty: 45 | Refills: 3 | Status: ACTIVE | COMMUNITY
Start: 2022-12-06 | End: 1900-01-01

## 2022-12-06 RX ORDER — FLUOCINONIDE 0.05 MG/G
0.05 OINTMENT TOPICAL
Qty: 3 | Refills: 3 | Status: ACTIVE | COMMUNITY
Start: 2022-11-09 | End: 1900-01-01

## 2022-12-08 PROBLEM — R51.9 HEADACHE: Status: ACTIVE | Noted: 2020-11-09

## 2022-12-08 NOTE — PHYSICAL EXAM
[No Acute Distress] : no acute distress [Well Nourished] : well nourished [Well Developed] : well developed [Well-Appearing] : well-appearing [Normal Sclera/Conjunctiva] : normal sclera/conjunctiva [Normal Outer Ear/Nose] : the outer ears and nose were normal in appearance [No JVD] : no jugular venous distention [No Lymphadenopathy] : no lymphadenopathy [Supple] : supple [No Respiratory Distress] : no respiratory distress  [No Accessory Muscle Use] : no accessory muscle use [Clear to Auscultation] : lungs were clear to auscultation bilaterally [Regular Rhythm] : with a regular rhythm [Normal Rate] : normal [Rhythm Regular] : regular [S2 Diminished] : was diminished [I] : a grade 1 [No Carotid Bruits] : no carotid bruits [No Abdominal Bruit] : a ~M bruit was not heard ~T in the abdomen [Pedal Pulses Present] : the pedal pulses are present [No Palpable Aorta] : no palpable aorta [No Extremity Clubbing/Cyanosis] : no extremity clubbing/cyanosis [___ +] : bilateral [unfilled]U+ pretibial pitting edema [Normal Appearance] : normal in appearance [No Nipple Discharge] : no nipple discharge [No Axillary Lymphadenopathy] : no axillary lymphadenopathy [Soft] : abdomen soft [Non Tender] : non-tender [Non-distended] : non-distended [No Masses] : no abdominal mass palpated [No HSM] : no HSM [Normal Bowel Sounds] : normal bowel sounds [Normal Posterior Cervical Nodes] : no posterior cervical lymphadenopathy [Normal Anterior Cervical Nodes] : no anterior cervical lymphadenopathy [No CVA Tenderness] : no CVA  tenderness [No Spinal Tenderness] : no spinal tenderness [No Joint Swelling] : no joint swelling [Grossly Normal Strength/Tone] : grossly normal strength/tone [Coordination Grossly Intact] : coordination grossly intact [No Focal Deficits] : no focal deficits [Normal Gait] : normal gait [Deep Tendon Reflexes (DTR)] : deep tendon reflexes were 2+ and symmetric [Speech Grossly Normal] : speech grossly normal [Memory Grossly Normal] : memory grossly normal [Normal Affect] : the affect was normal [Alert and Oriented x3] : oriented to person, place, and time [Normal Insight/Judgement] : insight and judgment were intact [Normal S2] : abnormal S2 [de-identified] : venous insufficiency edema and lymphedema, stasis dermatitis same [de-identified] : cvs changes,cellulitis left leg gone

## 2022-12-08 NOTE — REVIEW OF SYSTEMS
[Hearing Loss] : hearing loss [Lower Ext Edema] : lower extremity edema [Joint Pain] : joint pain [Headache] : headache [Negative] : Heme/Lymph [Fever] : no fever [Chills] : no chills [Fatigue] : no fatigue [Night Sweats] : no night sweats [Earache] : no earache [Hoarseness] : no hoarseness [Nasal Discharge] : no nasal discharge [Sore Throat] : no sore throat [Chest Pain] : no chest pain [Palpitations] : no palpitations [Leg Claudication] : no leg claudication [Orthopnea] : no orthopnea [Wheezing] : no wheezing [Cough] : no cough [Dyspnea on Exertion] : no dyspnea on exertion [Abdominal Pain] : no abdominal pain [Nausea] : no nausea [Constipation] : no constipation [Diarrhea] : diarrhea [Vomiting] : no vomiting [Heartburn] : no heartburn [Melena] : no melena [Joint Stiffness] : no joint stiffness [Muscle Pain] : no muscle pain [Back Pain] : no back pain [Itching] : no itching [Mole Changes] : no mole changes [Dizziness] : no dizziness [Confusion] : no confusion [Memory Loss] : no memory loss [de-identified] : see HPI [de-identified] : better

## 2022-12-08 NOTE — HISTORY OF PRESENT ILLNESS
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

## 2022-12-08 NOTE — ASSESSMENT
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

## 2022-12-12 ENCOUNTER — LABORATORY RESULT (OUTPATIENT)
Age: 87
End: 2022-12-12

## 2022-12-14 ENCOUNTER — LABORATORY RESULT (OUTPATIENT)
Age: 87
End: 2022-12-14

## 2023-01-18 ENCOUNTER — TRANSCRIPTION ENCOUNTER (OUTPATIENT)
Age: 88
End: 2023-01-18

## 2023-01-19 ENCOUNTER — LABORATORY RESULT (OUTPATIENT)
Age: 88
End: 2023-01-19

## 2023-01-20 ENCOUNTER — NON-APPOINTMENT (OUTPATIENT)
Age: 88
End: 2023-01-20

## 2023-02-09 ENCOUNTER — TRANSCRIPTION ENCOUNTER (OUTPATIENT)
Age: 88
End: 2023-02-09

## 2023-02-14 ENCOUNTER — APPOINTMENT (OUTPATIENT)
Dept: ELECTROPHYSIOLOGY | Facility: CLINIC | Age: 88
End: 2023-02-14
Payer: MEDICARE

## 2023-02-14 ENCOUNTER — NON-APPOINTMENT (OUTPATIENT)
Age: 88
End: 2023-02-14

## 2023-02-14 PROCEDURE — 93294 REM INTERROG EVL PM/LDLS PM: CPT

## 2023-02-14 PROCEDURE — 93296 REM INTERROG EVL PM/IDS: CPT

## 2023-02-15 ENCOUNTER — LABORATORY RESULT (OUTPATIENT)
Age: 88
End: 2023-02-15

## 2023-02-21 ENCOUNTER — NON-APPOINTMENT (OUTPATIENT)
Age: 88
End: 2023-02-21

## 2023-02-23 ENCOUNTER — LABORATORY RESULT (OUTPATIENT)
Age: 88
End: 2023-02-23

## 2023-02-28 ENCOUNTER — APPOINTMENT (OUTPATIENT)
Dept: INTERNAL MEDICINE | Facility: CLINIC | Age: 88
End: 2023-02-28
Payer: MEDICARE

## 2023-02-28 VITALS — OXYGEN SATURATION: 95 % | BODY MASS INDEX: 29.64 KG/M2 | HEIGHT: 60 IN | HEART RATE: 50 BPM | WEIGHT: 151 LBS

## 2023-02-28 VITALS — DIASTOLIC BLOOD PRESSURE: 58 MMHG | SYSTOLIC BLOOD PRESSURE: 120 MMHG

## 2023-02-28 PROCEDURE — 99214 OFFICE O/P EST MOD 30 MIN: CPT | Mod: 25

## 2023-02-28 PROCEDURE — 36415 COLL VENOUS BLD VENIPUNCTURE: CPT

## 2023-02-28 NOTE — ASSESSMENT
[FreeTextEntry1] : right leg cellulitis\par  stasis dermatitis\par chronic venous insufficiency\par s/p removal squamous cell right arm\par on warfarin, INR ok\par no diarrhea

## 2023-02-28 NOTE — PHYSICAL EXAM
[No Acute Distress] : no acute distress [Well Nourished] : well nourished [Well Developed] : well developed [Well-Appearing] : well-appearing [Normal Sclera/Conjunctiva] : normal sclera/conjunctiva [Normal Outer Ear/Nose] : the outer ears and nose were normal in appearance [No JVD] : no jugular venous distention [No Lymphadenopathy] : no lymphadenopathy [Supple] : supple [No Respiratory Distress] : no respiratory distress  [No Accessory Muscle Use] : no accessory muscle use [Clear to Auscultation] : lungs were clear to auscultation bilaterally [Regular Rhythm] : with a regular rhythm [Normal Rate] : normal [Rhythm Regular] : regular [Normal S2] : abnormal S2 [S2 Diminished] : was diminished [I] : a grade 1 [No Carotid Bruits] : no carotid bruits [No Abdominal Bruit] : a ~M bruit was not heard ~T in the abdomen [Pedal Pulses Present] : the pedal pulses are present [No Palpable Aorta] : no palpable aorta [No Extremity Clubbing/Cyanosis] : no extremity clubbing/cyanosis [___ +] : bilateral [unfilled]U+ pretibial pitting edema [No Nipple Discharge] : no nipple discharge [Soft] : abdomen soft [Non Tender] : non-tender [Non-distended] : non-distended [No Masses] : no abdominal mass palpated [No HSM] : no HSM [Normal Bowel Sounds] : normal bowel sounds [No CVA Tenderness] : no CVA  tenderness [No Spinal Tenderness] : no spinal tenderness [No Joint Swelling] : no joint swelling [Grossly Normal Strength/Tone] : grossly normal strength/tone [Coordination Grossly Intact] : coordination grossly intact [No Focal Deficits] : no focal deficits [Normal Gait] : normal gait [Deep Tendon Reflexes (DTR)] : deep tendon reflexes were 2+ and symmetric [Speech Grossly Normal] : speech grossly normal [Memory Grossly Normal] : memory grossly normal [Normal Affect] : the affect was normal [Alert and Oriented x3] : oriented to person, place, and time [Normal Insight/Judgement] : insight and judgment were intact [de-identified] : venous insufficiency edema and lymphedema, stasis dermatitis, suspect cellulitis right leg [de-identified] : cvs changes,cellulitis left leg gone

## 2023-02-28 NOTE — HISTORY OF PRESENT ILLNESS
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

## 2023-03-01 LAB
ANION GAP SERPL CALC-SCNC: 12 MMOL/L
BASOPHILS # BLD AUTO: 0.03 K/UL
BASOPHILS NFR BLD AUTO: 0.5 %
BUN SERPL-MCNC: 26 MG/DL
CALCIUM SERPL-MCNC: 9.4 MG/DL
CHLORIDE SERPL-SCNC: 106 MMOL/L
CO2 SERPL-SCNC: 27 MMOL/L
CREAT SERPL-MCNC: 1 MG/DL
EGFR: 52 ML/MIN/1.73M2
EOSINOPHIL # BLD AUTO: 0.14 K/UL
EOSINOPHIL NFR BLD AUTO: 2.5 %
ERYTHROCYTE [SEDIMENTATION RATE] IN BLOOD BY WESTERGREN METHOD: 4 MM/HR
GLUCOSE SERPL-MCNC: 168 MG/DL
HCT VFR BLD CALC: 39.6 %
HGB BLD-MCNC: 11.9 G/DL
IMM GRANULOCYTES NFR BLD AUTO: 0.2 %
LYMPHOCYTES # BLD AUTO: 1.14 K/UL
LYMPHOCYTES NFR BLD AUTO: 20.2 %
MAN DIFF?: NORMAL
MCHC RBC-ENTMCNC: 27.5 PG
MCHC RBC-ENTMCNC: 30.1 GM/DL
MCV RBC AUTO: 91.7 FL
MONOCYTES # BLD AUTO: 0.4 K/UL
MONOCYTES NFR BLD AUTO: 7.1 %
NEUTROPHILS # BLD AUTO: 3.92 K/UL
NEUTROPHILS NFR BLD AUTO: 69.5 %
PLATELET # BLD AUTO: 176 K/UL
POTASSIUM SERPL-SCNC: 4.3 MMOL/L
RBC # BLD: 4.32 M/UL
RBC # FLD: 13.5 %
SODIUM SERPL-SCNC: 144 MMOL/L
WBC # FLD AUTO: 5.64 K/UL

## 2023-03-03 ENCOUNTER — APPOINTMENT (OUTPATIENT)
Dept: INTERNAL MEDICINE | Facility: CLINIC | Age: 88
End: 2023-03-03
Payer: MEDICARE

## 2023-03-03 VITALS — DIASTOLIC BLOOD PRESSURE: 78 MMHG | SYSTOLIC BLOOD PRESSURE: 120 MMHG

## 2023-03-03 VITALS — WEIGHT: 148 LBS | BODY MASS INDEX: 28.9 KG/M2

## 2023-03-03 DIAGNOSIS — R26.81 UNSTEADINESS ON FEET: ICD-10-CM

## 2023-03-03 LAB — INR PPP: 2.3 RATIO

## 2023-03-03 PROCEDURE — 85610 PROTHROMBIN TIME: CPT | Mod: QW

## 2023-03-03 PROCEDURE — 99214 OFFICE O/P EST MOD 30 MIN: CPT | Mod: 25

## 2023-03-03 NOTE — HISTORY OF PRESENT ILLNESS
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\par nuclear stress test October 2016 showed normal left ventricular ejection fraction and no ischemia done at [facility]\par HTN well controlled \par INR followed with home draws\par basal cells and other skin cancers in past, had radiation left leg\par 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

## 2023-03-03 NOTE — PHYSICAL EXAM
[No Acute Distress] : no acute distress [Well Nourished] : well nourished [Well Developed] : well developed [Well-Appearing] : well-appearing [Normal Sclera/Conjunctiva] : normal sclera/conjunctiva [Normal Outer Ear/Nose] : the outer ears and nose were normal in appearance [No JVD] : no jugular venous distention [No Lymphadenopathy] : no lymphadenopathy [Supple] : supple [No Respiratory Distress] : no respiratory distress  [No Accessory Muscle Use] : no accessory muscle use [Clear to Auscultation] : lungs were clear to auscultation bilaterally [Regular Rhythm] : with a regular rhythm [Normal Rate] : normal [Rhythm Regular] : regular [Normal S2] : abnormal S2 [S2 Diminished] : was diminished [I] : a grade 1 [No Carotid Bruits] : no carotid bruits [No Abdominal Bruit] : a ~M bruit was not heard ~T in the abdomen [Pedal Pulses Present] : the pedal pulses are present [No Palpable Aorta] : no palpable aorta [No Extremity Clubbing/Cyanosis] : no extremity clubbing/cyanosis [___ +] : bilateral [unfilled]U+ pretibial pitting edema [No Nipple Discharge] : no nipple discharge [Soft] : abdomen soft [Non Tender] : non-tender [Non-distended] : non-distended [No Masses] : no abdominal mass palpated [No HSM] : no HSM [Normal Bowel Sounds] : normal bowel sounds [No CVA Tenderness] : no CVA  tenderness [No Spinal Tenderness] : no spinal tenderness [No Joint Swelling] : no joint swelling [Grossly Normal Strength/Tone] : grossly normal strength/tone [Coordination Grossly Intact] : coordination grossly intact [No Focal Deficits] : no focal deficits [Normal Gait] : normal gait [Deep Tendon Reflexes (DTR)] : deep tendon reflexes were 2+ and symmetric [Speech Grossly Normal] : speech grossly normal [Memory Grossly Normal] : memory grossly normal [Normal Affect] : the affect was normal [Alert and Oriented x3] : oriented to person, place, and time [Normal Insight/Judgement] : insight and judgment were intact [de-identified] : venous insufficiency edema and lymphedema, stasis dermatitis, improved cellulitis right leg, less swollen [de-identified] : cvs changes,cellulitis left leg gone

## 2023-03-03 NOTE — REVIEW OF SYSTEMS
[Hearing Loss] : hearing loss [Lower Ext Edema] : lower extremity edema [Joint Pain] : joint pain [Headache] : headache [Negative] : Heme/Lymph [Fever] : no fever [Chills] : no chills [Fatigue] : no fatigue [Night Sweats] : no night sweats [Earache] : no earache [Hoarseness] : no hoarseness [Nasal Discharge] : no nasal discharge [Sore Throat] : no sore throat [Chest Pain] : no chest pain [Palpitations] : no palpitations [Leg Claudication] : no leg claudication [Orthopnea] : no orthopnea [Wheezing] : no wheezing [Cough] : no cough [Dyspnea on Exertion] : no dyspnea on exertion [Abdominal Pain] : no abdominal pain [Nausea] : no nausea [Constipation] : no constipation [Diarrhea] : diarrhea [Vomiting] : no vomiting [Heartburn] : no heartburn [Melena] : no melena [Joint Stiffness] : no joint stiffness [Muscle Pain] : no muscle pain [Back Pain] : no back pain [Itching] : no itching [Mole Changes] : no mole changes [Dizziness] : no dizziness [Confusion] : no confusion [Memory Loss] : no memory loss [de-identified] : see HPI [de-identified] : better

## 2023-03-03 NOTE — ASSESSMENT
[FreeTextEntry1] : right leg cellulitis improved\par  stasis dermatitis\par chronic venous insufficiency edema less\par on warfarin, INR ok\par no diarrhea
COUGH/HEADACHE/PAIN

## 2023-03-06 ENCOUNTER — APPOINTMENT (OUTPATIENT)
Dept: INTERNAL MEDICINE | Facility: CLINIC | Age: 88
End: 2023-03-06
Payer: MEDICARE

## 2023-03-06 VITALS
HEIGHT: 60 IN | BODY MASS INDEX: 29.1 KG/M2 | HEART RATE: 58 BPM | SYSTOLIC BLOOD PRESSURE: 120 MMHG | DIASTOLIC BLOOD PRESSURE: 68 MMHG

## 2023-03-06 VITALS — HEIGHT: 60 IN | WEIGHT: 149 LBS | OXYGEN SATURATION: 96 % | HEART RATE: 59 BPM | BODY MASS INDEX: 29.25 KG/M2

## 2023-03-06 PROCEDURE — 99214 OFFICE O/P EST MOD 30 MIN: CPT

## 2023-03-06 NOTE — PLAN
[FreeTextEntry1] : increase lasix\par elevate\par vascular referral / follow up, has seen Xenophontos\par 10 day course doxycycline

## 2023-03-06 NOTE — PHYSICAL EXAM
[No Acute Distress] : no acute distress [Well Nourished] : well nourished [Well Developed] : well developed [Well-Appearing] : well-appearing [Normal Sclera/Conjunctiva] : normal sclera/conjunctiva [Normal Outer Ear/Nose] : the outer ears and nose were normal in appearance [No JVD] : no jugular venous distention [No Lymphadenopathy] : no lymphadenopathy [Supple] : supple [No Respiratory Distress] : no respiratory distress  [No Accessory Muscle Use] : no accessory muscle use [Clear to Auscultation] : lungs were clear to auscultation bilaterally [Regular Rhythm] : with a regular rhythm [Normal Rate] : normal [Rhythm Regular] : regular [S2 Diminished] : was diminished [I] : a grade 1 [No Carotid Bruits] : no carotid bruits [No Abdominal Bruit] : a ~M bruit was not heard ~T in the abdomen [Pedal Pulses Present] : the pedal pulses are present [No Palpable Aorta] : no palpable aorta [No Extremity Clubbing/Cyanosis] : no extremity clubbing/cyanosis [___ +] : bilateral [unfilled]U+ pretibial pitting edema [No Nipple Discharge] : no nipple discharge [Soft] : abdomen soft [Non Tender] : non-tender [Non-distended] : non-distended [No Masses] : no abdominal mass palpated [No HSM] : no HSM [Normal Bowel Sounds] : normal bowel sounds [No CVA Tenderness] : no CVA  tenderness [No Spinal Tenderness] : no spinal tenderness [No Joint Swelling] : no joint swelling [Grossly Normal Strength/Tone] : grossly normal strength/tone [Coordination Grossly Intact] : coordination grossly intact [No Focal Deficits] : no focal deficits [Normal Gait] : normal gait [Deep Tendon Reflexes (DTR)] : deep tendon reflexes were 2+ and symmetric [Speech Grossly Normal] : speech grossly normal [Memory Grossly Normal] : memory grossly normal [Normal Affect] : the affect was normal [Alert and Oriented x3] : oriented to person, place, and time [Normal Insight/Judgement] : insight and judgment were intact [Normal S2] : abnormal S2 [de-identified] : venous insufficiency edema and lymphedema, stasis dermatitis, improved cellulitis right leg swollen [de-identified] : cvs changes,cellulitis left leg gone

## 2023-03-06 NOTE — ASSESSMENT
[FreeTextEntry1] : right leg cellulitis improved\par  stasis dermatitis\par chronic venous insufficiency edema \par on warfarin, INR ok last week\par no diarrhea

## 2023-03-06 NOTE — HISTORY OF PRESENT ILLNESS
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\par nuclear stress test October 2016 showed normal left ventricular ejection fraction and no ischemia done at [facility]\par HTN well controlled \par INR followed with home draws\par basal cells and other skin cancers in past, had radiation left leg\par 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

## 2023-03-08 ENCOUNTER — TRANSCRIPTION ENCOUNTER (OUTPATIENT)
Age: 88
End: 2023-03-08

## 2023-03-13 ENCOUNTER — APPOINTMENT (OUTPATIENT)
Dept: INTERNAL MEDICINE | Facility: CLINIC | Age: 88
End: 2023-03-13
Payer: MEDICARE

## 2023-03-13 VITALS
OXYGEN SATURATION: 97 % | BODY MASS INDEX: 29.06 KG/M2 | DIASTOLIC BLOOD PRESSURE: 78 MMHG | HEART RATE: 60 BPM | HEIGHT: 60 IN | WEIGHT: 148 LBS | SYSTOLIC BLOOD PRESSURE: 143 MMHG

## 2023-03-13 VITALS — DIASTOLIC BLOOD PRESSURE: 76 MMHG | SYSTOLIC BLOOD PRESSURE: 124 MMHG

## 2023-03-13 DIAGNOSIS — L03.116 CELLULITIS OF LEFT LOWER LIMB: ICD-10-CM

## 2023-03-13 DIAGNOSIS — I87.2 VENOUS INSUFFICIENCY (CHRONIC) (PERIPHERAL): ICD-10-CM

## 2023-03-13 LAB — INR PPP: 2.8 RATIO

## 2023-03-13 PROCEDURE — 85610 PROTHROMBIN TIME: CPT | Mod: QW

## 2023-03-13 PROCEDURE — 99214 OFFICE O/P EST MOD 30 MIN: CPT | Mod: 25

## 2023-03-13 NOTE — ASSESSMENT
[FreeTextEntry1] : right leg cellulitis improved - gone\par  stasis dermatitis\par chronic venous insufficiency edema \par on warfarin, INR ok \par no diarrhea\par seeing Xenophontos

## 2023-03-13 NOTE — PLAN
[FreeTextEntry1] : same meds\par follow instructions Xenophontos\par for compression pump\par elevate until then

## 2023-03-13 NOTE — PHYSICAL EXAM
[No Acute Distress] : no acute distress [Well Nourished] : well nourished [Well Developed] : well developed [Well-Appearing] : well-appearing [Normal Sclera/Conjunctiva] : normal sclera/conjunctiva [Normal Outer Ear/Nose] : the outer ears and nose were normal in appearance [No JVD] : no jugular venous distention [No Lymphadenopathy] : no lymphadenopathy [Supple] : supple [No Respiratory Distress] : no respiratory distress  [No Accessory Muscle Use] : no accessory muscle use [Clear to Auscultation] : lungs were clear to auscultation bilaterally [Regular Rhythm] : with a regular rhythm [Normal Rate] : normal [Rhythm Regular] : regular [S2 Diminished] : was diminished [I] : a grade 1 [No Carotid Bruits] : no carotid bruits [No Abdominal Bruit] : a ~M bruit was not heard ~T in the abdomen [Pedal Pulses Present] : the pedal pulses are present [No Palpable Aorta] : no palpable aorta [No Extremity Clubbing/Cyanosis] : no extremity clubbing/cyanosis [___ +] : bilateral [unfilled]U+ pretibial pitting edema [No Nipple Discharge] : no nipple discharge [Soft] : abdomen soft [Non Tender] : non-tender [Non-distended] : non-distended [No Masses] : no abdominal mass palpated [No HSM] : no HSM [Normal Bowel Sounds] : normal bowel sounds [No CVA Tenderness] : no CVA  tenderness [No Spinal Tenderness] : no spinal tenderness [No Joint Swelling] : no joint swelling [Grossly Normal Strength/Tone] : grossly normal strength/tone [Coordination Grossly Intact] : coordination grossly intact [No Focal Deficits] : no focal deficits [Normal Gait] : normal gait [Deep Tendon Reflexes (DTR)] : deep tendon reflexes were 2+ and symmetric [Speech Grossly Normal] : speech grossly normal [Memory Grossly Normal] : memory grossly normal [Normal Affect] : the affect was normal [Alert and Oriented x3] : oriented to person, place, and time [Normal Insight/Judgement] : insight and judgment were intact [Normal S2] : abnormal S2 [de-identified] : venous insufficiency edema and lymphedema, stasis dermatitis, improved cellulitis right leg swollen [de-identified] : right LE much bettercvs changes,cellulitis left leg gone

## 2023-03-13 NOTE — HISTORY OF PRESENT ILLNESS
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

## 2023-03-14 ENCOUNTER — TRANSCRIPTION ENCOUNTER (OUTPATIENT)
Age: 88
End: 2023-03-14

## 2023-03-21 ENCOUNTER — LABORATORY RESULT (OUTPATIENT)
Age: 88
End: 2023-03-21

## 2023-04-10 ENCOUNTER — TRANSCRIPTION ENCOUNTER (OUTPATIENT)
Age: 88
End: 2023-04-10

## 2023-04-19 ENCOUNTER — TRANSCRIPTION ENCOUNTER (OUTPATIENT)
Age: 88
End: 2023-04-19

## 2023-04-20 ENCOUNTER — TRANSCRIPTION ENCOUNTER (OUTPATIENT)
Age: 88
End: 2023-04-20

## 2023-04-20 ENCOUNTER — LABORATORY RESULT (OUTPATIENT)
Age: 88
End: 2023-04-20

## 2023-04-24 ENCOUNTER — TRANSCRIPTION ENCOUNTER (OUTPATIENT)
Age: 88
End: 2023-04-24

## 2023-04-25 ENCOUNTER — APPOINTMENT (OUTPATIENT)
Dept: INTERNAL MEDICINE | Facility: CLINIC | Age: 88
End: 2023-04-25
Payer: MEDICARE

## 2023-04-25 VITALS
DIASTOLIC BLOOD PRESSURE: 84 MMHG | SYSTOLIC BLOOD PRESSURE: 147 MMHG | OXYGEN SATURATION: 96 % | WEIGHT: 149 LBS | TEMPERATURE: 97.8 F | HEART RATE: 61 BPM | BODY MASS INDEX: 29.25 KG/M2 | HEIGHT: 60 IN

## 2023-04-25 VITALS — SYSTOLIC BLOOD PRESSURE: 120 MMHG | DIASTOLIC BLOOD PRESSURE: 74 MMHG

## 2023-04-25 DIAGNOSIS — R35.0 FREQUENCY OF MICTURITION: ICD-10-CM

## 2023-04-25 PROCEDURE — 99214 OFFICE O/P EST MOD 30 MIN: CPT

## 2023-04-25 NOTE — PHYSICAL EXAM
[No Acute Distress] : no acute distress [Well Nourished] : well nourished [Well Developed] : well developed [Well-Appearing] : well-appearing [Normal Sclera/Conjunctiva] : normal sclera/conjunctiva [Normal Outer Ear/Nose] : the outer ears and nose were normal in appearance [No JVD] : no jugular venous distention [No Lymphadenopathy] : no lymphadenopathy [Supple] : supple [No Respiratory Distress] : no respiratory distress  [No Accessory Muscle Use] : no accessory muscle use [Clear to Auscultation] : lungs were clear to auscultation bilaterally [Regular Rhythm] : with a regular rhythm [Normal Rate] : normal [Rhythm Regular] : regular [S2 Diminished] : was diminished [I] : a grade 1 [No Carotid Bruits] : no carotid bruits [No Abdominal Bruit] : a ~M bruit was not heard ~T in the abdomen [Pedal Pulses Present] : the pedal pulses are present [No Palpable Aorta] : no palpable aorta [No Extremity Clubbing/Cyanosis] : no extremity clubbing/cyanosis [___ +] : bilateral [unfilled]U+ pretibial pitting edema [No Nipple Discharge] : no nipple discharge [Soft] : abdomen soft [Non Tender] : non-tender [Non-distended] : non-distended [No Masses] : no abdominal mass palpated [No HSM] : no HSM [Normal Bowel Sounds] : normal bowel sounds [Normal Sphincter Tone] : normal sphincter tone [No Mass] : no mass [No CVA Tenderness] : no CVA  tenderness [No Spinal Tenderness] : no spinal tenderness [No Joint Swelling] : no joint swelling [Grossly Normal Strength/Tone] : grossly normal strength/tone [Coordination Grossly Intact] : coordination grossly intact [No Focal Deficits] : no focal deficits [Normal Gait] : normal gait [Deep Tendon Reflexes (DTR)] : deep tendon reflexes were 2+ and symmetric [Speech Grossly Normal] : speech grossly normal [Memory Grossly Normal] : memory grossly normal [Normal Affect] : the affect was normal [Alert and Oriented x3] : oriented to person, place, and time [Normal Insight/Judgement] : insight and judgment were intact [Normal S2] : abnormal S2 [Stool Occult Blood] : stool negative for occult blood [de-identified] : venous insufficiency edema and lymphedema, stasis dermatitis, improved  [de-identified] : right LE much bettercvs changes,cellulitis left leg gone, ? lesion left second toe

## 2023-04-25 NOTE — ASSESSMENT
[FreeTextEntry1] : right leg cellulitis - gone\par  stasis dermatitis better\par chronic venous insufficiency edema  better\par on warfarin, INR just low dose was increased \par seeing Xenophontos\par loose stools, mild incontinence of stool, frequent urination without dysuria from diuretic

## 2023-04-25 NOTE — HISTORY OF PRESENT ILLNESS
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\par nuclear stress test October 2016 showed normal left ventricular ejection fraction and no ischemia done at [facility]\par HTN well controlled \par INR followed with home draws\par basal cells and other skin cancers in past, had radiation left leg\par 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

## 2023-04-25 NOTE — PLAN
[FreeTextEntry1] : check bloods with next home draw\par reduce Miralax\par all reviewed with son\par Consideration of urology consultation\par Consider proctology evaluation

## 2023-04-26 RX ORDER — KETOCONAZOLE 20 MG/G
2 CREAM TOPICAL
Qty: 1 | Refills: 5 | Status: ACTIVE | COMMUNITY
Start: 2023-04-25 | End: 1900-01-01

## 2023-04-27 ENCOUNTER — TRANSCRIPTION ENCOUNTER (OUTPATIENT)
Age: 88
End: 2023-04-27

## 2023-05-09 ENCOUNTER — TRANSCRIPTION ENCOUNTER (OUTPATIENT)
Age: 88
End: 2023-05-09

## 2023-05-10 ENCOUNTER — NON-APPOINTMENT (OUTPATIENT)
Age: 88
End: 2023-05-10

## 2023-05-11 ENCOUNTER — TRANSCRIPTION ENCOUNTER (OUTPATIENT)
Age: 88
End: 2023-05-11

## 2023-05-16 ENCOUNTER — NON-APPOINTMENT (OUTPATIENT)
Age: 88
End: 2023-05-16

## 2023-05-16 ENCOUNTER — APPOINTMENT (OUTPATIENT)
Dept: ELECTROPHYSIOLOGY | Facility: CLINIC | Age: 88
End: 2023-05-16
Payer: MEDICARE

## 2023-05-16 PROCEDURE — 93294 REM INTERROG EVL PM/LDLS PM: CPT

## 2023-05-16 PROCEDURE — 93296 REM INTERROG EVL PM/IDS: CPT

## 2023-05-17 ENCOUNTER — LABORATORY RESULT (OUTPATIENT)
Age: 88
End: 2023-05-17

## 2023-05-17 ENCOUNTER — TRANSCRIPTION ENCOUNTER (OUTPATIENT)
Age: 88
End: 2023-05-17

## 2023-05-18 ENCOUNTER — LABORATORY RESULT (OUTPATIENT)
Age: 88
End: 2023-05-18

## 2023-05-21 NOTE — DISCHARGE NOTE PROVIDER - REASON FOR ADMISSION
Pauses noted on Holter Monitor
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

## 2023-05-22 ENCOUNTER — TRANSCRIPTION ENCOUNTER (OUTPATIENT)
Age: 88
End: 2023-05-22

## 2023-06-15 ENCOUNTER — LABORATORY RESULT (OUTPATIENT)
Age: 88
End: 2023-06-15

## 2023-06-19 ENCOUNTER — NON-APPOINTMENT (OUTPATIENT)
Age: 88
End: 2023-06-19

## 2023-06-28 NOTE — PATIENT PROFILE ADULT - NSPROEXTENSIONSOFSELF_GEN_A_NUR
dentures/eyeglasses Mart-1 - Negative Histology Text: MART-1 staining demonstrates a normal density and pattern of melanocytes along the dermal-epidermal junction. The surgical margins are negative for tumor cells.

## 2023-07-03 RX ORDER — DOXYCYCLINE HYCLATE 100 MG/1
100 TABLET ORAL
Qty: 20 | Refills: 0 | Status: COMPLETED | COMMUNITY
Start: 2022-09-08 | End: 2023-07-03

## 2023-07-03 RX ORDER — FUROSEMIDE 20 MG/1
20 TABLET ORAL
Qty: 90 | Refills: 3 | Status: ACTIVE | COMMUNITY
Start: 2023-07-03 | End: 1900-01-01

## 2023-07-03 RX ORDER — FUROSEMIDE 40 MG/1
40 TABLET ORAL
Qty: 45 | Refills: 1 | Status: ACTIVE | COMMUNITY
Start: 2018-09-12 | End: 1900-01-01

## 2023-07-03 RX ORDER — POTASSIUM CHLORIDE 750 MG/1
10 CAPSULE, EXTENDED RELEASE ORAL
Qty: 90 | Refills: 3 | Status: ACTIVE | COMMUNITY
Start: 2018-09-12 | End: 1900-01-01

## 2023-07-05 ENCOUNTER — TRANSCRIPTION ENCOUNTER (OUTPATIENT)
Age: 88
End: 2023-07-05

## 2023-07-06 ENCOUNTER — APPOINTMENT (OUTPATIENT)
Dept: INTERNAL MEDICINE | Facility: CLINIC | Age: 88
End: 2023-07-06
Payer: MEDICARE

## 2023-07-06 VITALS — BODY MASS INDEX: 28.47 KG/M2 | HEART RATE: 70 BPM | HEIGHT: 60 IN | WEIGHT: 145 LBS | OXYGEN SATURATION: 98 %

## 2023-07-06 VITALS — SYSTOLIC BLOOD PRESSURE: 124 MMHG | DIASTOLIC BLOOD PRESSURE: 60 MMHG

## 2023-07-06 DIAGNOSIS — R04.0 EPISTAXIS: ICD-10-CM

## 2023-07-06 DIAGNOSIS — K92.1 MELENA: ICD-10-CM

## 2023-07-06 LAB
INR PPP: 2.8 RATIO
POCT-PROTHROMBIN TIME: 33.2 SECS

## 2023-07-06 PROCEDURE — 99214 OFFICE O/P EST MOD 30 MIN: CPT | Mod: 25

## 2023-07-06 PROCEDURE — 36415 COLL VENOUS BLD VENIPUNCTURE: CPT

## 2023-07-06 PROCEDURE — 85610 PROTHROMBIN TIME: CPT | Mod: QW

## 2023-07-06 NOTE — PHYSICAL EXAM
[No Acute Distress] : no acute distress [Well Nourished] : well nourished [Well Developed] : well developed [Well-Appearing] : well-appearing [Normal Sclera/Conjunctiva] : normal sclera/conjunctiva [Normal Outer Ear/Nose] : the outer ears and nose were normal in appearance [No JVD] : no jugular venous distention [No Lymphadenopathy] : no lymphadenopathy [Supple] : supple [No Respiratory Distress] : no respiratory distress  [No Accessory Muscle Use] : no accessory muscle use [Clear to Auscultation] : lungs were clear to auscultation bilaterally [Regular Rhythm] : with a regular rhythm [Normal Rate] : normal [Rhythm Regular] : regular [Normal S2] : abnormal S2 [S2 Diminished] : was diminished [I] : a grade 1 [No Carotid Bruits] : no carotid bruits [No Abdominal Bruit] : a ~M bruit was not heard ~T in the abdomen [Pedal Pulses Present] : the pedal pulses are present [No Palpable Aorta] : no palpable aorta [No Extremity Clubbing/Cyanosis] : no extremity clubbing/cyanosis [___ +] : bilateral [unfilled]U+ pretibial pitting edema [No Nipple Discharge] : no nipple discharge [Soft] : abdomen soft [Non Tender] : non-tender [Non-distended] : non-distended [No Masses] : no abdominal mass palpated [No HSM] : no HSM [Normal Bowel Sounds] : normal bowel sounds [No CVA Tenderness] : no CVA  tenderness [No Spinal Tenderness] : no spinal tenderness [No Joint Swelling] : no joint swelling [Grossly Normal Strength/Tone] : grossly normal strength/tone [Coordination Grossly Intact] : coordination grossly intact [No Focal Deficits] : no focal deficits [Normal Gait] : normal gait [Deep Tendon Reflexes (DTR)] : deep tendon reflexes were 2+ and symmetric [Speech Grossly Normal] : speech grossly normal [Memory Grossly Normal] : memory grossly normal [Normal Affect] : the affect was normal [Alert and Oriented x3] : oriented to person, place, and time [Normal Insight/Judgement] : insight and judgment were intact [de-identified] : venous insufficiency edema and lymphedema, stasis dermatitis [de-identified] : right LE much bettercvs changes,cellulitis left leg gone, ? lesion left second toe

## 2023-07-06 NOTE — ASSESSMENT
[FreeTextEntry1] : epistaxis severe has inflatable nasal tampon present\par to be removed by ent later\par  stasis dermatitis \par chronic venous insufficiency edema  \par on warfarin, INR 2.8\par saw Xenophontos\par melena

## 2023-07-06 NOTE — HISTORY OF PRESENT ILLNESS
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

## 2023-07-06 NOTE — PLAN
[FreeTextEntry1] : check bloods\par reduce warfarin, hold today\par 1/2 pill daily until off antibiotic

## 2023-07-07 ENCOUNTER — NON-APPOINTMENT (OUTPATIENT)
Age: 88
End: 2023-07-07

## 2023-07-07 LAB
ANION GAP SERPL CALC-SCNC: 9 MMOL/L
BUN SERPL-MCNC: 22 MG/DL
CALCIUM SERPL-MCNC: 8.8 MG/DL
CHLORIDE SERPL-SCNC: 108 MMOL/L
CO2 SERPL-SCNC: 27 MMOL/L
CREAT SERPL-MCNC: 1.06 MG/DL
EGFR: 48 ML/MIN/1.73M2
GLUCOSE SERPL-MCNC: 107 MG/DL
POTASSIUM SERPL-SCNC: 4.6 MMOL/L
SODIUM SERPL-SCNC: 144 MMOL/L

## 2023-07-10 ENCOUNTER — LABORATORY RESULT (OUTPATIENT)
Age: 88
End: 2023-07-10

## 2023-07-12 ENCOUNTER — NON-APPOINTMENT (OUTPATIENT)
Age: 88
End: 2023-07-12

## 2023-07-14 ENCOUNTER — TRANSCRIPTION ENCOUNTER (OUTPATIENT)
Age: 88
End: 2023-07-14

## 2023-07-18 ENCOUNTER — LABORATORY RESULT (OUTPATIENT)
Age: 88
End: 2023-07-18

## 2023-07-19 ENCOUNTER — LABORATORY RESULT (OUTPATIENT)
Age: 88
End: 2023-07-19

## 2023-07-21 ENCOUNTER — TRANSCRIPTION ENCOUNTER (OUTPATIENT)
Age: 88
End: 2023-07-21

## 2023-07-30 ENCOUNTER — TRANSCRIPTION ENCOUNTER (OUTPATIENT)
Age: 88
End: 2023-07-30

## 2023-08-15 ENCOUNTER — TRANSCRIPTION ENCOUNTER (OUTPATIENT)
Age: 88
End: 2023-08-15

## 2023-08-16 ENCOUNTER — NON-APPOINTMENT (OUTPATIENT)
Age: 88
End: 2023-08-16

## 2023-08-16 ENCOUNTER — APPOINTMENT (OUTPATIENT)
Dept: ELECTROPHYSIOLOGY | Facility: CLINIC | Age: 88
End: 2023-08-16
Payer: MEDICARE

## 2023-08-16 VITALS
OXYGEN SATURATION: 99 % | DIASTOLIC BLOOD PRESSURE: 61 MMHG | WEIGHT: 145 LBS | HEIGHT: 60 IN | BODY MASS INDEX: 28.47 KG/M2 | SYSTOLIC BLOOD PRESSURE: 145 MMHG | HEART RATE: 71 BPM

## 2023-08-16 PROCEDURE — 93280 PM DEVICE PROGR EVAL DUAL: CPT

## 2023-08-16 PROCEDURE — 93000 ELECTROCARDIOGRAM COMPLETE: CPT | Mod: 59

## 2023-08-17 ENCOUNTER — LABORATORY RESULT (OUTPATIENT)
Age: 88
End: 2023-08-17

## 2023-08-20 RX ORDER — POTASSIUM CHLORIDE 750 MG/1
10 TABLET, FILM COATED, EXTENDED RELEASE ORAL
Qty: 10 | Refills: 0 | Status: COMPLETED | COMMUNITY
Start: 2023-07-03 | End: 2023-08-20

## 2023-08-30 ENCOUNTER — TRANSCRIPTION ENCOUNTER (OUTPATIENT)
Age: 88
End: 2023-08-30

## 2023-08-30 ENCOUNTER — LABORATORY RESULT (OUTPATIENT)
Age: 88
End: 2023-08-30

## 2023-08-31 ENCOUNTER — LABORATORY RESULT (OUTPATIENT)
Age: 88
End: 2023-08-31

## 2023-09-07 ENCOUNTER — APPOINTMENT (OUTPATIENT)
Dept: INTERNAL MEDICINE | Facility: CLINIC | Age: 88
End: 2023-09-07
Payer: MEDICARE

## 2023-09-07 VITALS — SYSTOLIC BLOOD PRESSURE: 134 MMHG | DIASTOLIC BLOOD PRESSURE: 74 MMHG

## 2023-09-07 VITALS
HEIGHT: 60 IN | TEMPERATURE: 98.5 F | OXYGEN SATURATION: 97 % | BODY MASS INDEX: 28.47 KG/M2 | SYSTOLIC BLOOD PRESSURE: 145 MMHG | HEART RATE: 63 BPM | DIASTOLIC BLOOD PRESSURE: 65 MMHG | WEIGHT: 145 LBS

## 2023-09-07 DIAGNOSIS — Z86.711 PERSONAL HISTORY OF PULMONARY EMBOLISM: ICD-10-CM

## 2023-09-07 DIAGNOSIS — R05.9 COUGH, UNSPECIFIED: ICD-10-CM

## 2023-09-07 LAB — INR PPP: 2.8 RATIO

## 2023-09-07 PROCEDURE — 85610 PROTHROMBIN TIME: CPT | Mod: QW

## 2023-09-07 PROCEDURE — 99214 OFFICE O/P EST MOD 30 MIN: CPT | Mod: 25

## 2023-09-07 NOTE — PHYSICAL EXAM
[No Acute Distress] : no acute distress [Well Nourished] : well nourished [Well Developed] : well developed [Normal Sclera/Conjunctiva] : normal sclera/conjunctiva [Normal Outer Ear/Nose] : the outer ears and nose were normal in appearance [No JVD] : no jugular venous distention [No Lymphadenopathy] : no lymphadenopathy [Supple] : supple [No Respiratory Distress] : no respiratory distress  [No Accessory Muscle Use] : no accessory muscle use [Clear to Auscultation] : lungs were clear to auscultation bilaterally [Regular Rhythm] : with a regular rhythm [Normal Rate] : normal [Rhythm Regular] : regular [S2 Diminished] : was diminished [I] : a grade 1 [No Carotid Bruits] : no carotid bruits [No Abdominal Bruit] : a ~M bruit was not heard ~T in the abdomen [Pedal Pulses Present] : the pedal pulses are present [No Palpable Aorta] : no palpable aorta [No Extremity Clubbing/Cyanosis] : no extremity clubbing/cyanosis [___ +] : bilateral [unfilled]U+ pretibial pitting edema [No Nipple Discharge] : no nipple discharge [Soft] : abdomen soft [Non Tender] : non-tender [Non-distended] : non-distended [No Masses] : no abdominal mass palpated [No HSM] : no HSM [Normal Bowel Sounds] : normal bowel sounds [No CVA Tenderness] : no CVA  tenderness [No Spinal Tenderness] : no spinal tenderness [No Joint Swelling] : no joint swelling [Grossly Normal Strength/Tone] : grossly normal strength/tone [Coordination Grossly Intact] : coordination grossly intact [No Focal Deficits] : no focal deficits [Normal Gait] : normal gait [Deep Tendon Reflexes (DTR)] : deep tendon reflexes were 2+ and symmetric [Speech Grossly Normal] : speech grossly normal [Memory Grossly Normal] : memory grossly normal [Normal Affect] : the affect was normal [Alert and Oriented x3] : oriented to person, place, and time [Normal Insight/Judgement] : insight and judgment were intact [Normal S2] : abnormal S2 [de-identified] : venous insufficiency edema and lymphedema, stasis dermatitis [de-identified] : CVS cahanges, lymphedema

## 2023-09-07 NOTE — REVIEW OF SYSTEMS
[Hearing Loss] : hearing loss [Lower Ext Edema] : lower extremity edema [Cough] : cough [Joint Pain] : joint pain [Headache] : headache [Negative] : Heme/Lymph [Fever] : no fever [Fatigue] : no fatigue [Chills] : no chills [Night Sweats] : no night sweats [Earache] : no earache [Hoarseness] : no hoarseness [Nasal Discharge] : no nasal discharge [Sore Throat] : no sore throat [Chest Pain] : no chest pain [Palpitations] : no palpitations [Leg Claudication] : no leg claudication [Orthopnea] : no orthopnea [Wheezing] : no wheezing [Dyspnea on Exertion] : no dyspnea on exertion [Abdominal Pain] : no abdominal pain [Nausea] : no nausea [Constipation] : no constipation [Vomiting] : no vomiting [Diarrhea] : diarrhea [Heartburn] : no heartburn [Melena] : no melena [Joint Stiffness] : no joint stiffness [Muscle Pain] : no muscle pain [Itching] : no itching [Back Pain] : no back pain [Mole Changes] : no mole changes [Dizziness] : no dizziness [Confusion] : no confusion [Memory Loss] : no memory loss [de-identified] : see HPI [de-identified] : better

## 2023-09-07 NOTE — HISTORY OF PRESENT ILLNESS
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 HTN well controlled  INR followed with home draws basal cells and other skin cancers in past, had radiation left leg excellent result s/p squamous cell right forearm   venous insufficiency and edema/lymphedema seeing vascular Xenophontos didn't like torsemide, back on furosemide leg infection/cellulitis 9/2022 after dropping knife cutting left LLE

## 2023-09-07 NOTE — ASSESSMENT
[FreeTextEntry1] : Cough malaise viral-like symptoms with son with 5 days of similar symptoms and he is her only real exposure no further epistaxis   stasis dermatitis  chronic venous insufficiency edema   on warfarin, INR 2.8 needs to see Xenophontos

## 2023-09-07 NOTE — PLAN
[FreeTextEntry1] : Respiratory panel Throat culture Chest x-ray Will not start antibiotics at this time but we will have to watch her Coumadin for now we will continue the same dose of warfarin

## 2023-09-07 NOTE — HEALTH RISK ASSESSMENT
[0] : 2) Feeling down, depressed, or hopeless: Not at all (0) [PHQ-2 Negative - No further assessment needed] : PHQ-2 Negative - No further assessment needed [Former] : Former [10-14] : 10-14 [> 15 Years] : > 15 Years [[id number]] : 0

## 2023-09-08 ENCOUNTER — TRANSCRIPTION ENCOUNTER (OUTPATIENT)
Age: 88
End: 2023-09-08

## 2023-09-08 ENCOUNTER — NON-APPOINTMENT (OUTPATIENT)
Age: 88
End: 2023-09-08

## 2023-09-08 DIAGNOSIS — R10.9 UNSPECIFIED ABDOMINAL PAIN: ICD-10-CM

## 2023-09-08 LAB
ALBUMIN SERPL ELPH-MCNC: 3.8 G/DL
ALP BLD-CCNC: 70 U/L
ALT SERPL-CCNC: 16 U/L
ANION GAP SERPL CALC-SCNC: 10 MMOL/L
AST SERPL-CCNC: 18 U/L
BASOPHILS # BLD AUTO: 0.03 K/UL
BASOPHILS NFR BLD AUTO: 0.4 %
BILIRUB SERPL-MCNC: 1.1 MG/DL
BUN SERPL-MCNC: 17 MG/DL
CALCIUM SERPL-MCNC: 8.9 MG/DL
CHLORIDE SERPL-SCNC: 106 MMOL/L
CO2 SERPL-SCNC: 27 MMOL/L
CREAT SERPL-MCNC: 0.98 MG/DL
EGFR: 53 ML/MIN/1.73M2
EOSINOPHIL # BLD AUTO: 0.08 K/UL
EOSINOPHIL NFR BLD AUTO: 1 %
GLUCOSE SERPL-MCNC: 71 MG/DL
HCT VFR BLD CALC: 34.8 %
HGB BLD-MCNC: 10.6 G/DL
IMM GRANULOCYTES NFR BLD AUTO: 0.2 %
LPL SERPL-CCNC: 104 U/L
LYMPHOCYTES # BLD AUTO: 1.74 K/UL
LYMPHOCYTES NFR BLD AUTO: 21.2 %
MAN DIFF?: NORMAL
MCHC RBC-ENTMCNC: 27 PG
MCHC RBC-ENTMCNC: 30.5 GM/DL
MCV RBC AUTO: 88.8 FL
MONOCYTES # BLD AUTO: 0.78 K/UL
MONOCYTES NFR BLD AUTO: 9.5 %
NEUTROPHILS # BLD AUTO: 5.57 K/UL
NEUTROPHILS NFR BLD AUTO: 67.7 %
PLATELET # BLD AUTO: 147 K/UL
POTASSIUM SERPL-SCNC: 5.4 MMOL/L
PROT SERPL-MCNC: 6 G/DL
RAPID RVP RESULT: NOT DETECTED
RBC # BLD: 3.92 M/UL
RBC # FLD: 14.2 %
SARS-COV-2 RNA PNL RESP NAA+PROBE: NOT DETECTED
SODIUM SERPL-SCNC: 143 MMOL/L
WBC # FLD AUTO: 8.22 K/UL

## 2023-09-11 ENCOUNTER — TRANSCRIPTION ENCOUNTER (OUTPATIENT)
Age: 88
End: 2023-09-11

## 2023-09-11 RX ORDER — GLIPIZIDE 2.5 MG/1
2.5 TABLET, FILM COATED, EXTENDED RELEASE ORAL DAILY
Qty: 90 | Refills: 1 | Status: DISCONTINUED | COMMUNITY
Start: 2018-06-20 | End: 2023-09-11

## 2023-09-12 ENCOUNTER — TRANSCRIPTION ENCOUNTER (OUTPATIENT)
Age: 88
End: 2023-09-12

## 2023-09-15 ENCOUNTER — APPOINTMENT (OUTPATIENT)
Dept: INTERNAL MEDICINE | Facility: CLINIC | Age: 88
End: 2023-09-15
Payer: MEDICARE

## 2023-09-15 ENCOUNTER — RESULT CHARGE (OUTPATIENT)
Age: 88
End: 2023-09-15

## 2023-09-15 VITALS
HEIGHT: 60 IN | SYSTOLIC BLOOD PRESSURE: 176 MMHG | DIASTOLIC BLOOD PRESSURE: 64 MMHG | OXYGEN SATURATION: 96 % | HEART RATE: 60 BPM

## 2023-09-15 VITALS — SYSTOLIC BLOOD PRESSURE: 145 MMHG | DIASTOLIC BLOOD PRESSURE: 60 MMHG

## 2023-09-15 DIAGNOSIS — R79.1 ABNORMAL COAGULATION PROFILE: ICD-10-CM

## 2023-09-15 LAB
INR PPP: 5.1 RATIO
POCT-PROTHROMBIN TIME: 61.2 SECS

## 2023-09-15 PROCEDURE — 99214 OFFICE O/P EST MOD 30 MIN: CPT

## 2023-09-18 ENCOUNTER — TRANSCRIPTION ENCOUNTER (OUTPATIENT)
Age: 88
End: 2023-09-18

## 2023-09-18 LAB
ALBUMIN SERPL ELPH-MCNC: 3.6 G/DL
ALP BLD-CCNC: 86 U/L
ALT SERPL-CCNC: 24 U/L
ANION GAP SERPL CALC-SCNC: 9 MMOL/L
AST SERPL-CCNC: 25 U/L
BASOPHILS # BLD AUTO: 0.03 K/UL
BASOPHILS NFR BLD AUTO: 0.4 %
BILIRUB SERPL-MCNC: 0.5 MG/DL
BUN SERPL-MCNC: 18 MG/DL
CALCIUM SERPL-MCNC: 8.6 MG/DL
CHLORIDE SERPL-SCNC: 104 MMOL/L
CO2 SERPL-SCNC: 27 MMOL/L
CREAT SERPL-MCNC: 1.12 MG/DL
EGFR: 45 ML/MIN/1.73M2
EOSINOPHIL # BLD AUTO: 0.31 K/UL
EOSINOPHIL NFR BLD AUTO: 4.6 %
GLUCOSE SERPL-MCNC: 98 MG/DL
HCT VFR BLD CALC: 34.4 %
HGB BLD-MCNC: 10.5 G/DL
IMM GRANULOCYTES NFR BLD AUTO: 0.4 %
LYMPHOCYTES # BLD AUTO: 1.3 K/UL
LYMPHOCYTES NFR BLD AUTO: 19.3 %
MAN DIFF?: NORMAL
MCHC RBC-ENTMCNC: 26.4 PG
MCHC RBC-ENTMCNC: 30.5 GM/DL
MCV RBC AUTO: 86.6 FL
MONOCYTES # BLD AUTO: 0.56 K/UL
MONOCYTES NFR BLD AUTO: 8.3 %
NEUTROPHILS # BLD AUTO: 4.49 K/UL
NEUTROPHILS NFR BLD AUTO: 67 %
PLATELET # BLD AUTO: 222 K/UL
POTASSIUM SERPL-SCNC: 4.6 MMOL/L
PROT SERPL-MCNC: 6 G/DL
RBC # BLD: 3.97 M/UL
RBC # FLD: 13.7 %
SODIUM SERPL-SCNC: 141 MMOL/L
WBC # FLD AUTO: 6.72 K/UL

## 2023-09-19 ENCOUNTER — LABORATORY RESULT (OUTPATIENT)
Age: 88
End: 2023-09-19

## 2023-09-19 ENCOUNTER — TRANSCRIPTION ENCOUNTER (OUTPATIENT)
Age: 88
End: 2023-09-19

## 2023-09-21 ENCOUNTER — APPOINTMENT (OUTPATIENT)
Dept: INTERNAL MEDICINE | Facility: CLINIC | Age: 88
End: 2023-09-21
Payer: MEDICARE

## 2023-09-21 VITALS — HEIGHT: 60 IN | BODY MASS INDEX: 28.66 KG/M2 | WEIGHT: 146 LBS

## 2023-09-21 VITALS — SYSTOLIC BLOOD PRESSURE: 130 MMHG | DIASTOLIC BLOOD PRESSURE: 70 MMHG

## 2023-09-21 DIAGNOSIS — H10.9 UNSPECIFIED CONJUNCTIVITIS: ICD-10-CM

## 2023-09-21 DIAGNOSIS — A49.9 URINARY TRACT INFECTION, SITE NOT SPECIFIED: ICD-10-CM

## 2023-09-21 DIAGNOSIS — R59.0 LOCALIZED ENLARGED LYMPH NODES: ICD-10-CM

## 2023-09-21 DIAGNOSIS — N39.0 URINARY TRACT INFECTION, SITE NOT SPECIFIED: ICD-10-CM

## 2023-09-21 LAB — INR PPP: 2 RATIO

## 2023-09-21 PROCEDURE — 85610 PROTHROMBIN TIME: CPT | Mod: QW

## 2023-09-21 PROCEDURE — 99214 OFFICE O/P EST MOD 30 MIN: CPT | Mod: 25

## 2023-09-23 LAB
APPEARANCE: CLEAR
BACTERIA: NEGATIVE /HPF
BILIRUBIN URINE: NEGATIVE
BLOOD URINE: NEGATIVE
CAST: 2 /LPF
COLOR: YELLOW
EPITHELIAL CELLS: 0 /HPF
GLUCOSE QUALITATIVE U: NEGATIVE MG/DL
KETONES URINE: NEGATIVE MG/DL
LEUKOCYTE ESTERASE URINE: NEGATIVE
MICROSCOPIC-UA: NORMAL
NITRITE URINE: NEGATIVE
PH URINE: 6
PROTEIN URINE: NEGATIVE MG/DL
RED BLOOD CELLS URINE: 0 /HPF
SPECIFIC GRAVITY URINE: 1.01
UROBILINOGEN URINE: 0.2 MG/DL
WHITE BLOOD CELLS URINE: 0 /HPF

## 2023-09-26 ENCOUNTER — TRANSCRIPTION ENCOUNTER (OUTPATIENT)
Age: 88
End: 2023-09-26

## 2023-09-26 ENCOUNTER — LABORATORY RESULT (OUTPATIENT)
Age: 88
End: 2023-09-26

## 2023-09-27 ENCOUNTER — LABORATORY RESULT (OUTPATIENT)
Age: 88
End: 2023-09-27

## 2023-09-28 LAB — BACTERIA UR CULT: ABNORMAL

## 2023-10-03 ENCOUNTER — TRANSCRIPTION ENCOUNTER (OUTPATIENT)
Age: 88
End: 2023-10-03

## 2023-10-03 RX ORDER — WARFARIN 2.5 MG/1
2.5 TABLET ORAL DAILY
Qty: 90 | Refills: 3 | Status: ACTIVE | COMMUNITY
Start: 2023-10-03 | End: 1900-01-01

## 2023-10-19 ENCOUNTER — LABORATORY RESULT (OUTPATIENT)
Age: 88
End: 2023-10-19

## 2023-10-20 RX ORDER — WARFARIN 2.5 MG/1
2.5 TABLET ORAL
Qty: 90 | Refills: 1 | Status: ACTIVE | COMMUNITY
Start: 2021-02-18

## 2023-11-08 ENCOUNTER — LABORATORY RESULT (OUTPATIENT)
Age: 88
End: 2023-11-08

## 2023-11-08 ENCOUNTER — TRANSCRIPTION ENCOUNTER (OUTPATIENT)
Age: 88
End: 2023-11-08

## 2023-11-09 ENCOUNTER — LABORATORY RESULT (OUTPATIENT)
Age: 88
End: 2023-11-09

## 2023-11-15 ENCOUNTER — NON-APPOINTMENT (OUTPATIENT)
Age: 88
End: 2023-11-15

## 2023-11-15 ENCOUNTER — APPOINTMENT (OUTPATIENT)
Dept: ELECTROPHYSIOLOGY | Facility: CLINIC | Age: 88
End: 2023-11-15
Payer: MEDICARE

## 2023-11-15 PROCEDURE — 93296 REM INTERROG EVL PM/IDS: CPT

## 2023-11-15 PROCEDURE — 93294 REM INTERROG EVL PM/LDLS PM: CPT

## 2023-11-27 ENCOUNTER — TRANSCRIPTION ENCOUNTER (OUTPATIENT)
Age: 88
End: 2023-11-27

## 2023-11-28 ENCOUNTER — LABORATORY RESULT (OUTPATIENT)
Age: 88
End: 2023-11-28

## 2023-12-06 ENCOUNTER — TRANSCRIPTION ENCOUNTER (OUTPATIENT)
Age: 88
End: 2023-12-06

## 2023-12-07 ENCOUNTER — NON-APPOINTMENT (OUTPATIENT)
Age: 88
End: 2023-12-07

## 2023-12-07 ENCOUNTER — APPOINTMENT (OUTPATIENT)
Dept: INTERNAL MEDICINE | Facility: CLINIC | Age: 88
End: 2023-12-07
Payer: MEDICARE

## 2023-12-07 VITALS — HEIGHT: 60 IN | HEART RATE: 66 BPM | OXYGEN SATURATION: 97 %

## 2023-12-07 VITALS — DIASTOLIC BLOOD PRESSURE: 60 MMHG | SYSTOLIC BLOOD PRESSURE: 120 MMHG

## 2023-12-07 VITALS — HEIGHT: 60 IN | OXYGEN SATURATION: 97 % | HEART RATE: 66 BPM | WEIGHT: 138 LBS | BODY MASS INDEX: 27.09 KG/M2

## 2023-12-07 DIAGNOSIS — I10 ESSENTIAL (PRIMARY) HYPERTENSION: ICD-10-CM

## 2023-12-07 DIAGNOSIS — L21.0 SEBORRHEA CAPITIS: ICD-10-CM

## 2023-12-07 DIAGNOSIS — I89.0 LYMPHEDEMA, NOT ELSEWHERE CLASSIFIED: ICD-10-CM

## 2023-12-07 DIAGNOSIS — E78.5 HYPERLIPIDEMIA, UNSPECIFIED: ICD-10-CM

## 2023-12-07 DIAGNOSIS — R06.09 OTHER FORMS OF DYSPNEA: ICD-10-CM

## 2023-12-07 DIAGNOSIS — Z00.00 ENCOUNTER FOR GENERAL ADULT MEDICAL EXAMINATION W/OUT ABNORMAL FINDINGS: ICD-10-CM

## 2023-12-07 DIAGNOSIS — R31.21 ASYMPTOMATIC MICROSCOPIC HEMATURIA: ICD-10-CM

## 2023-12-07 DIAGNOSIS — R91.8 OTHER NONSPECIFIC ABNORMAL FINDING OF LUNG FIELD: ICD-10-CM

## 2023-12-07 PROCEDURE — 99213 OFFICE O/P EST LOW 20 MIN: CPT | Mod: 25

## 2023-12-07 PROCEDURE — 93000 ELECTROCARDIOGRAM COMPLETE: CPT

## 2023-12-07 PROCEDURE — G0439: CPT

## 2023-12-07 RX ORDER — KETOCONAZOLE 20.5 MG/ML
2 SHAMPOO, SUSPENSION TOPICAL
Qty: 3 | Refills: 3 | Status: ACTIVE | COMMUNITY
Start: 2023-12-07 | End: 1900-01-01

## 2023-12-18 ENCOUNTER — TRANSCRIPTION ENCOUNTER (OUTPATIENT)
Age: 88
End: 2023-12-18

## 2023-12-19 ENCOUNTER — TRANSCRIPTION ENCOUNTER (OUTPATIENT)
Age: 88
End: 2023-12-19

## 2023-12-20 ENCOUNTER — LABORATORY RESULT (OUTPATIENT)
Age: 88
End: 2023-12-20

## 2023-12-21 ENCOUNTER — LABORATORY RESULT (OUTPATIENT)
Age: 88
End: 2023-12-21

## 2024-01-18 ENCOUNTER — LABORATORY RESULT (OUTPATIENT)
Age: 89
End: 2024-01-18

## 2024-02-12 ENCOUNTER — APPOINTMENT (OUTPATIENT)
Dept: INTERNAL MEDICINE | Facility: CLINIC | Age: 89
End: 2024-02-12
Payer: MEDICARE

## 2024-02-12 ENCOUNTER — NON-APPOINTMENT (OUTPATIENT)
Age: 89
End: 2024-02-12

## 2024-02-12 VITALS
HEIGHT: 60 IN | OXYGEN SATURATION: 97 % | DIASTOLIC BLOOD PRESSURE: 64 MMHG | HEART RATE: 61 BPM | SYSTOLIC BLOOD PRESSURE: 140 MMHG | BODY MASS INDEX: 27.09 KG/M2 | WEIGHT: 138 LBS

## 2024-02-12 VITALS — SYSTOLIC BLOOD PRESSURE: 120 MMHG | DIASTOLIC BLOOD PRESSURE: 78 MMHG

## 2024-02-12 DIAGNOSIS — R07.89 OTHER CHEST PAIN: ICD-10-CM

## 2024-02-12 LAB
INR PPP: 2.3 RATIO
POCT-PROTHROMBIN TIME: 27.5 SECS

## 2024-02-12 PROCEDURE — 85610 PROTHROMBIN TIME: CPT | Mod: QW

## 2024-02-12 PROCEDURE — 93000 ELECTROCARDIOGRAM COMPLETE: CPT | Mod: 59

## 2024-02-12 PROCEDURE — 99214 OFFICE O/P EST MOD 30 MIN: CPT

## 2024-02-12 PROCEDURE — G2211 COMPLEX E/M VISIT ADD ON: CPT

## 2024-02-12 NOTE — PLAN
[FreeTextEntry1] : Patient is 89 going on 89 and if possible does not wish to go to hospital No concerning findings on exam Accidental tearing of skin from EKG lead left forearm above risk and right upper chest minimal damage areas cleansed and dressed by me Will arrange to get home blood draw Same warfarin

## 2024-02-12 NOTE — ASSESSMENT
[FreeTextEntry1] : upper abdomen l9ower chest discomfort brief one yesterday one today declines ER visit using the zip up compression stockings daily Dyspnea on exertion is minimal and stable  Unable to draw bloods here in office EKG not helpful due to bundle branch block and pacemaker INR in range

## 2024-02-12 NOTE — PHYSICAL EXAM
[No Acute Distress] : no acute distress [Well Nourished] : well nourished [Well Developed] : well developed [Normal Sclera/Conjunctiva] : normal sclera/conjunctiva [Normal Outer Ear/Nose] : the outer ears and nose were normal in appearance [No JVD] : no jugular venous distention [No Lymphadenopathy] : no lymphadenopathy [Supple] : supple [No Respiratory Distress] : no respiratory distress  [No Accessory Muscle Use] : no accessory muscle use [Clear to Auscultation] : lungs were clear to auscultation bilaterally [Regular Rhythm] : with a regular rhythm [Normal Rate] : normal [Rhythm Regular] : regular [S2 Diminished] : was diminished [II] : a grade 2 [I] : a grade 1 [No Carotid Bruits] : no carotid bruits [No Abdominal Bruit] : a ~M bruit was not heard ~T in the abdomen [Pedal Pulses Present] : the pedal pulses are present [No Palpable Aorta] : no palpable aorta [No Extremity Clubbing/Cyanosis] : no extremity clubbing/cyanosis [___ +] : bilateral [unfilled]U+ pretibial pitting edema [Normal Appearance] : normal in appearance [No Axillary Lymphadenopathy] : no axillary lymphadenopathy [Soft] : abdomen soft [Non Tender] : non-tender [Non-distended] : non-distended [No Masses] : no abdominal mass palpated [No HSM] : no HSM [Normal Bowel Sounds] : normal bowel sounds [No CVA Tenderness] : no CVA  tenderness [No Spinal Tenderness] : no spinal tenderness [No Joint Swelling] : no joint swelling [Grossly Normal Strength/Tone] : grossly normal strength/tone [Coordination Grossly Intact] : coordination grossly intact [No Focal Deficits] : no focal deficits [Normal Gait] : normal gait [Deep Tendon Reflexes (DTR)] : deep tendon reflexes were 2+ and symmetric [Speech Grossly Normal] : speech grossly normal [Memory Grossly Normal] : memory grossly normal [Normal Affect] : the affect was normal [Alert and Oriented x3] : oriented to person, place, and time [Normal Insight/Judgement] : insight and judgment were intact [Normal S2] : abnormal S2 [de-identified] : i venous insufficiency edema and lymphedema, stasis dermatitis, pulses better on right [de-identified] : enlarged left inguinal LN [de-identified] : Stasis dermatitis

## 2024-02-12 NOTE — HISTORY OF PRESENT ILLNESS
[FreeTextEntry1] : here with abnormal sensation [de-identified] : across upper abdomen lower chest brief not with activity swept floor today no issues may have had it this morning past cholecystectomy lesion on nose scalp pruritis using zip up compression stockings knee high good results both legs les edema 9/8/23 had CT A and P Zwanger ok (left inguinal adenopathy) and nl cxr 9/11/23 bilateral shoulder went to  ER records reviewed Seen in follow-up last week in my absence at which time Levaquin which had been started for infiltrates in the lung on the CT angiogram as well as a UTI was stopped after completing 5 days of course  arthritis, some issues from neck s/p PPI 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 nuclear stress test October 2016 showed normal left ventricular ejection fraction and no ischemia done at [facility] HTN well controlled  INR followed with home draws basal cells and other skin cancers in past, had radiation left leg excellent result s/p squamous cell right forearm   venous insufficiency and edema/lymphedema seeing vascular Xenophontos didn't like torsemide, back on furosemide leg infection/cellulitis 9/2022 after dropping knife cutting left LLE  After doing EKG skin on patient's left arm was accidentally torn and removing EKG leads which were especially sticky Area was cleansed and dressed a superficial area of skin removed wound clean will follow

## 2024-02-13 ENCOUNTER — TRANSCRIPTION ENCOUNTER (OUTPATIENT)
Age: 89
End: 2024-02-13

## 2024-02-13 ENCOUNTER — NON-APPOINTMENT (OUTPATIENT)
Age: 89
End: 2024-02-13

## 2024-02-13 DIAGNOSIS — R30.0 DYSURIA: ICD-10-CM

## 2024-02-14 ENCOUNTER — TRANSCRIPTION ENCOUNTER (OUTPATIENT)
Age: 89
End: 2024-02-14

## 2024-02-14 ENCOUNTER — OUTPATIENT (OUTPATIENT)
Dept: OUTPATIENT SERVICES | Facility: HOSPITAL | Age: 89
LOS: 1 days | End: 2024-02-14
Payer: MEDICARE

## 2024-02-14 ENCOUNTER — APPOINTMENT (OUTPATIENT)
Dept: ELECTROPHYSIOLOGY | Facility: CLINIC | Age: 89
End: 2024-02-14
Payer: MEDICARE

## 2024-02-14 DIAGNOSIS — T82.110A BREAKDOWN (MECHANICAL) OF CARDIAC ELECTRODE, INITIAL ENCOUNTER: ICD-10-CM

## 2024-02-14 PROCEDURE — 93294 REM INTERROG EVL PM/LDLS PM: CPT

## 2024-02-14 PROCEDURE — 93296 REM INTERROG EVL PM/IDS: CPT

## 2024-02-14 PROCEDURE — 71046 X-RAY EXAM CHEST 2 VIEWS: CPT | Mod: 26

## 2024-02-14 PROCEDURE — 71046 X-RAY EXAM CHEST 2 VIEWS: CPT

## 2024-02-14 PROCEDURE — 93280 PM DEVICE PROGR EVAL DUAL: CPT | Mod: 59

## 2024-02-15 ENCOUNTER — LABORATORY RESULT (OUTPATIENT)
Age: 89
End: 2024-02-15

## 2024-02-15 ENCOUNTER — NON-APPOINTMENT (OUTPATIENT)
Age: 89
End: 2024-02-15

## 2024-02-15 ENCOUNTER — TRANSCRIPTION ENCOUNTER (OUTPATIENT)
Age: 89
End: 2024-02-15

## 2024-02-15 ENCOUNTER — APPOINTMENT (OUTPATIENT)
Dept: INTERNAL MEDICINE | Facility: CLINIC | Age: 89
End: 2024-02-15
Payer: MEDICARE

## 2024-02-15 PROCEDURE — 93306 TTE W/DOPPLER COMPLETE: CPT

## 2024-02-16 ENCOUNTER — TRANSCRIPTION ENCOUNTER (OUTPATIENT)
Age: 89
End: 2024-02-16

## 2024-02-16 LAB
APPEARANCE: CLEAR
BACTERIA: NEGATIVE /HPF
BILIRUBIN URINE: NEGATIVE
BLOOD URINE: ABNORMAL
CAST: 0 /LPF
COLOR: NORMAL
EPITHELIAL CELLS: 7 /HPF
GLUCOSE QUALITATIVE U: NEGATIVE MG/DL
KETONES URINE: ABNORMAL MG/DL
LEUKOCYTE ESTERASE URINE: ABNORMAL
MICROSCOPIC-UA: NORMAL
NITRITE URINE: NEGATIVE
PH URINE: 5.5
PROTEIN URINE: NORMAL MG/DL
RED BLOOD CELLS URINE: 5 /HPF
REVIEW: NORMAL
SPECIFIC GRAVITY URINE: 1.02
UROBILINOGEN URINE: 0.2 MG/DL
WHITE BLOOD CELLS URINE: 5 /HPF

## 2024-02-17 LAB — BACTERIA UR CULT: NORMAL

## 2024-02-27 ENCOUNTER — TRANSCRIPTION ENCOUNTER (OUTPATIENT)
Age: 89
End: 2024-02-27

## 2024-03-21 ENCOUNTER — TRANSCRIPTION ENCOUNTER (OUTPATIENT)
Age: 89
End: 2024-03-21

## 2024-03-22 ENCOUNTER — LABORATORY RESULT (OUTPATIENT)
Age: 89
End: 2024-03-22

## 2024-03-27 RX ORDER — ATORVASTATIN CALCIUM 40 MG/1
40 TABLET, FILM COATED ORAL
Qty: 90 | Refills: 0 | Status: ACTIVE | COMMUNITY
Start: 2018-10-09 | End: 1900-01-01

## 2024-04-01 ENCOUNTER — TRANSCRIPTION ENCOUNTER (OUTPATIENT)
Age: 89
End: 2024-04-01

## 2024-04-03 ENCOUNTER — TRANSCRIPTION ENCOUNTER (OUTPATIENT)
Age: 89
End: 2024-04-03

## 2024-04-05 ENCOUNTER — TRANSCRIPTION ENCOUNTER (OUTPATIENT)
Age: 89
End: 2024-04-05

## 2024-04-05 NOTE — PLAN
Department of Internal Medicine  Nephrology Progress Note        SUBJECTIVE:    We are following this patient for A/CKD and Hyperkalemia.  The patient was seen and examined; she feels well today with no CP, SOB, nausea or vomiting.    ROS: No fever or chills.  Social: No family at bedside.    Physical Exam:    VITALS:  BP (!) 110/47   Pulse 75   Temp 99.4 °F (37.4 °C) (Bladder)   Resp 22   Wt 70.6 kg (155 lb 10.3 oz)   SpO2 92%   BMI 30.40 kg/m²     General appearance: Seems comfortable, no acute distress.  Neck: Trachea midline, thyroid normal.   Lungs:  Non labored breathing, CTA to anterior auscultation.  Heart:  S1S2 normal, rub or gallop. No peripheral edema.  Abdomen: Soft, non-tender, no organomegaly.   Skin: No lesions or rashes, warm to touch.     DATA:    CBC with Differential:    Lab Results   Component Value Date/Time    WBC 5.4 04/05/2024 06:00 AM    RBC 2.54 04/05/2024 06:00 AM    HGB 7.0 04/05/2024 06:00 AM    HCT 23.1 04/05/2024 06:00 AM    PLT 65 04/05/2024 06:00 AM    MCV 90.7 04/05/2024 06:00 AM    MCH 27.7 04/05/2024 06:00 AM    MCHC 30.5 04/05/2024 06:00 AM    RDW 19.2 04/05/2024 06:00 AM    SEGSPCT 53.4 04/29/2015 04:14 PM    SEGSPCT 53.4 04/29/2015 04:14 PM    LYMPHOPCT 15.1 04/04/2024 06:15 AM    MONOPCT 10.3 04/04/2024 06:15 AM    EOSPCT 7.6 02/27/2012 11:08 AM    BASOPCT 0.4 04/04/2024 06:15 AM    MONOSABS 0.6 04/04/2024 06:15 AM    LYMPHSABS 0.9 04/04/2024 06:15 AM    EOSABS 0.2 04/04/2024 06:15 AM    BASOSABS 0.0 04/04/2024 06:15 AM    DIFFTYPE Auto 06/04/2011 02:30 AM     BMP:    Lab Results   Component Value Date/Time     04/05/2024 09:20 AM    K 4.6 04/05/2024 09:20 AM    K 4.6 04/05/2024 06:00 AM     04/05/2024 09:20 AM    CO2 29 04/05/2024 09:20 AM    BUN 8 04/05/2024 09:20 AM    LABALBU 2.4 04/05/2024 09:20 AM    CREATININE 1.2 04/05/2024 09:20 AM    CALCIUM 7.6 04/05/2024 09:20 AM    GFRAA 33 10/14/2022 01:29 PM    GFRAA >60 06/04/2011 02:30 AM    LABGLOM 46  [FreeTextEntry1] : same meds

## 2024-04-11 ENCOUNTER — TRANSCRIPTION ENCOUNTER (OUTPATIENT)
Age: 89
End: 2024-04-11

## 2024-04-22 ENCOUNTER — LABORATORY RESULT (OUTPATIENT)
Age: 89
End: 2024-04-22

## 2024-04-24 ENCOUNTER — LABORATORY RESULT (OUTPATIENT)
Age: 89
End: 2024-04-24

## 2024-05-01 ENCOUNTER — TRANSCRIPTION ENCOUNTER (OUTPATIENT)
Age: 89
End: 2024-05-01

## 2024-05-14 ENCOUNTER — NON-APPOINTMENT (OUTPATIENT)
Age: 89
End: 2024-05-14

## 2024-05-15 ENCOUNTER — APPOINTMENT (OUTPATIENT)
Dept: ELECTROPHYSIOLOGY | Facility: CLINIC | Age: 89
End: 2024-05-15
Payer: MEDICARE

## 2024-05-15 PROCEDURE — 93294 REM INTERROG EVL PM/LDLS PM: CPT

## 2024-05-15 PROCEDURE — 93296 REM INTERROG EVL PM/IDS: CPT

## 2024-05-17 ENCOUNTER — LABORATORY RESULT (OUTPATIENT)
Age: 89
End: 2024-05-17

## 2024-05-20 ENCOUNTER — TRANSCRIPTION ENCOUNTER (OUTPATIENT)
Age: 89
End: 2024-05-20

## 2024-05-20 ENCOUNTER — APPOINTMENT (OUTPATIENT)
Dept: INTERNAL MEDICINE | Facility: CLINIC | Age: 89
End: 2024-05-20
Payer: MEDICARE

## 2024-05-20 VITALS
DIASTOLIC BLOOD PRESSURE: 73 MMHG | OXYGEN SATURATION: 96 % | SYSTOLIC BLOOD PRESSURE: 133 MMHG | HEART RATE: 74 BPM | HEIGHT: 60 IN

## 2024-05-20 DIAGNOSIS — L03.115 CELLULITIS OF RIGHT LOWER LIMB: ICD-10-CM

## 2024-05-20 DIAGNOSIS — E11.9 TYPE 2 DIABETES MELLITUS W/OUT COMPLICATIONS: ICD-10-CM

## 2024-05-20 DIAGNOSIS — Z51.81 ENCOUNTER FOR THERAPEUTIC DRUG LVL MONITORING: ICD-10-CM

## 2024-05-20 DIAGNOSIS — R60.9 EDEMA, UNSPECIFIED: ICD-10-CM

## 2024-05-20 DIAGNOSIS — I47.29 OTHER VENTRICULAR TACHYCARDIA: ICD-10-CM

## 2024-05-20 DIAGNOSIS — B35.3 TINEA PEDIS: ICD-10-CM

## 2024-05-20 DIAGNOSIS — I35.0 NONRHEUMATIC AORTIC (VALVE) STENOSIS: ICD-10-CM

## 2024-05-20 DIAGNOSIS — Z79.01 LONG TERM (CURRENT) USE OF ANTICOAGULANTS: ICD-10-CM

## 2024-05-20 DIAGNOSIS — I87.2 VENOUS INSUFFICIENCY (CHRONIC) (PERIPHERAL): ICD-10-CM

## 2024-05-20 DIAGNOSIS — Z95.0 PRESENCE OF CARDIAC PACEMAKER: ICD-10-CM

## 2024-05-20 PROCEDURE — G2211 COMPLEX E/M VISIT ADD ON: CPT

## 2024-05-20 PROCEDURE — 99214 OFFICE O/P EST MOD 30 MIN: CPT

## 2024-05-20 RX ORDER — TERBINAFINE HYDROCHLORIDE 1 G/100G
1 CREAM TOPICAL 3 TIMES DAILY
Refills: 0 | Status: ACTIVE | COMMUNITY
Start: 2024-05-20

## 2024-05-20 NOTE — ASSESSMENT
[FreeTextEntry1] : using the zip up compression stockings  Dyspnea on exertion is minimal and stable  CBC normal renal fxn ok INR in range Doubt cellulitis suspect tinea pedis on the top of the right foot A component of dermatitis cannot be excluded More fullness in right lower extremity

## 2024-05-20 NOTE — PHYSICAL EXAM
[No Acute Distress] : no acute distress [Well Nourished] : well nourished [Well Developed] : well developed [Normal Sclera/Conjunctiva] : normal sclera/conjunctiva [Normal Outer Ear/Nose] : the outer ears and nose were normal in appearance [No JVD] : no jugular venous distention [No Lymphadenopathy] : no lymphadenopathy [No Respiratory Distress] : no respiratory distress  [No Accessory Muscle Use] : no accessory muscle use [Clear to Auscultation] : lungs were clear to auscultation bilaterally [Regular Rhythm] : with a regular rhythm [Normal Rate] : normal [Rhythm Regular] : regular [S2 Diminished] : was diminished [II] : a grade 2 [I] : a grade 1 [No Carotid Bruits] : no carotid bruits [No Abdominal Bruit] : a ~M bruit was not heard ~T in the abdomen [Pedal Pulses Present] : the pedal pulses are present [No Palpable Aorta] : no palpable aorta [No Extremity Clubbing/Cyanosis] : no extremity clubbing/cyanosis [___ +] : bilateral [unfilled]U+ pretibial pitting edema [Normal Appearance] : normal in appearance [No Axillary Lymphadenopathy] : no axillary lymphadenopathy [Soft] : abdomen soft [Non Tender] : non-tender [Non-distended] : non-distended [No Masses] : no abdominal mass palpated [No HSM] : no HSM [Normal Bowel Sounds] : normal bowel sounds [No CVA Tenderness] : no CVA  tenderness [No Spinal Tenderness] : no spinal tenderness [No Joint Swelling] : no joint swelling [Grossly Normal Strength/Tone] : grossly normal strength/tone [Coordination Grossly Intact] : coordination grossly intact [No Focal Deficits] : no focal deficits [Normal Gait] : normal gait [Deep Tendon Reflexes (DTR)] : deep tendon reflexes were 2+ and symmetric [Speech Grossly Normal] : speech grossly normal [Memory Grossly Normal] : memory grossly normal [Normal Affect] : the affect was normal [Alert and Oriented x3] : oriented to person, place, and time [Normal Insight/Judgement] : insight and judgment were intact [Normal S2] : abnormal S2 [de-identified] : i venous insufficiency edema and lymphedema, stasis dermatitis, pulses better on right, tinea pedis right [de-identified] : enlarged left inguinal LN [de-identified] : Stasis dermatitis

## 2024-05-20 NOTE — HISTORY OF PRESENT ILLNESS
[FreeTextEntry1] : Here to follow multiple ongoing conditions [de-identified] : Has noticed some slight redness on top of right foot over the last 5 or 6 days There is no discomfort with this She is unsure if there is more swelling or not of her right lower extremity She has been using the compression stockings with zippers but feels some discomfort above the area at the top of the zipper of the right leg and feels that her right leg feels key than her left Bloods done Friday, May 17 Showed normal white blood cell count, normal kidney function, normal liver function, normal hemoglobin and hematocrit, and a therapeutic INR of 2.54 Last seen February 12, 2024 past cholecystectomy lesion on nose scalp pruritis using zip up compression stockings knee high good results both legs les edema 9/8/23 had CT A and P Zwanger ok (left inguinal adenopathy) and nl cxr 9/11/23 bilateral shoulder went to  ER records reviewed Seen in follow-up last week in my absence at which time Levaquin which had been started for infiltrates in the lung on the CT angiogram as well as a UTI was stopped after completing 5 days of course  arthritis, some issues from neck s/p PPI 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 nuclear stress test October 2016 showed normal left ventricular ejection fraction and no ischemia done at [facility] HTN well controlled  INR followed with home draws basal cells and other skin cancers in past, had radiation left leg excellent result s/p squamous cell right forearm   venous insufficiency and edema/lymphedema seeing vascular Xenophontos didn't like torsemide, back on furosemide leg infection/cellulitis 9/2022 after dropping knife cutting left LLE  After doing EKG skin on patient's left arm was accidentally torn and removing EKG leads which were especially sticky Area was cleansed and dressed a superficial area of skin removed wound clean will follow

## 2024-05-20 NOTE — PLAN
[FreeTextEntry1] : Venous Doppler right lower extremity Topical antifungal treatment if over-the-counter Lamisil does not work we will order prescription strength medication

## 2024-05-20 NOTE — HEALTH RISK ASSESSMENT
[0] : 2) Feeling down, depressed, or hopeless: Not at all (0) [PHQ-2 Negative - No further assessment needed] : PHQ-2 Negative - No further assessment needed [[id number]] : 0 [Former] : Former [5-9] : 5-9 [> 15 Years] : > 15 Years

## 2024-05-28 ENCOUNTER — TRANSCRIPTION ENCOUNTER (OUTPATIENT)
Age: 89
End: 2024-05-28

## 2024-05-28 RX ORDER — LISINOPRIL 5 MG/1
5 TABLET ORAL
Qty: 90 | Refills: 3 | Status: ACTIVE | COMMUNITY
Start: 2022-05-03 | End: 1900-01-01

## 2024-05-29 ENCOUNTER — TRANSCRIPTION ENCOUNTER (OUTPATIENT)
Age: 89
End: 2024-05-29

## 2024-06-20 ENCOUNTER — LABORATORY RESULT (OUTPATIENT)
Age: 89
End: 2024-06-20

## 2024-06-27 ENCOUNTER — TRANSCRIPTION ENCOUNTER (OUTPATIENT)
Age: 89
End: 2024-06-27

## 2024-06-28 ENCOUNTER — TRANSCRIPTION ENCOUNTER (OUTPATIENT)
Age: 89
End: 2024-06-28

## 2024-07-19 ENCOUNTER — LABORATORY RESULT (OUTPATIENT)
Age: 89
End: 2024-07-19

## 2024-07-24 ENCOUNTER — NON-APPOINTMENT (OUTPATIENT)
Age: 89
End: 2024-07-24

## 2024-08-01 ENCOUNTER — TRANSCRIPTION ENCOUNTER (OUTPATIENT)
Age: 89
End: 2024-08-01

## 2024-08-08 ENCOUNTER — TRANSCRIPTION ENCOUNTER (OUTPATIENT)
Age: 89
End: 2024-08-08

## 2024-08-14 ENCOUNTER — TRANSCRIPTION ENCOUNTER (OUTPATIENT)
Age: 89
End: 2024-08-14

## 2024-08-15 ENCOUNTER — LABORATORY RESULT (OUTPATIENT)
Age: 89
End: 2024-08-15

## 2024-08-18 ENCOUNTER — RESULT CHARGE (OUTPATIENT)
Age: 89
End: 2024-08-18

## 2024-08-19 ENCOUNTER — APPOINTMENT (OUTPATIENT)
Dept: ELECTROPHYSIOLOGY | Facility: CLINIC | Age: 89
End: 2024-08-19

## 2024-08-19 VITALS
SYSTOLIC BLOOD PRESSURE: 153 MMHG | WEIGHT: 140 LBS | DIASTOLIC BLOOD PRESSURE: 68 MMHG | HEIGHT: 60 IN | HEART RATE: 61 BPM | BODY MASS INDEX: 27.48 KG/M2 | OXYGEN SATURATION: 98 %

## 2024-08-19 PROCEDURE — 93000 ELECTROCARDIOGRAM COMPLETE: CPT | Mod: XU

## 2024-08-19 PROCEDURE — 93280 PM DEVICE PROGR EVAL DUAL: CPT

## 2024-08-19 RX ORDER — POLYETHYLENE GLYCOL 3350 17 G
17 POWDER IN PACKET (EA) ORAL
Refills: 0 | Status: ACTIVE | COMMUNITY
Start: 2024-08-19

## 2024-08-26 ENCOUNTER — TRANSCRIPTION ENCOUNTER (OUTPATIENT)
Age: 89
End: 2024-08-26

## 2024-09-17 DIAGNOSIS — E07.9 DISORDER OF THYROID, UNSPECIFIED: ICD-10-CM

## 2024-09-18 ENCOUNTER — TRANSCRIPTION ENCOUNTER (OUTPATIENT)
Age: 89
End: 2024-09-18

## 2024-09-19 ENCOUNTER — LABORATORY RESULT (OUTPATIENT)
Age: 89
End: 2024-09-19

## 2024-09-23 ENCOUNTER — TRANSCRIPTION ENCOUNTER (OUTPATIENT)
Age: 89
End: 2024-09-23

## 2024-10-08 ENCOUNTER — TRANSCRIPTION ENCOUNTER (OUTPATIENT)
Age: 89
End: 2024-10-08

## 2024-10-17 ENCOUNTER — LABORATORY RESULT (OUTPATIENT)
Age: 89
End: 2024-10-17

## 2024-11-08 NOTE — ED PROVIDER NOTE - CONSULTANT FREE TEXT FOR MDM DISCUSSED CASE WITH QUESTION
LABS:                          14.6   7.80  )-----------( 139      ( 08 Nov 2024 06:46 )             43.3     11-08    138  |  102  |  30[H]  ----------------------------<  84  4.4   |  29  |  1.40[H]    Ca    9.3      08 Nov 2024 06:46  Phos  3.2     11-08  Mg     1.8     11-08    TPro  7.2  /  Alb  3.5  /  TBili  0.9  /  DBili  x   /  AST  15  /  ALT  10  /  AlkPhos  78  11-08    LIVER FUNCTIONS - ( 08 Nov 2024 06:46 )  Alb: 3.5 g/dL / Pro: 7.2 g/dL / ALK PHOS: 78 U/L / ALT: 10 U/L / AST: 15 U/L / GGT: x
ep

## 2024-11-12 ENCOUNTER — TRANSCRIPTION ENCOUNTER (OUTPATIENT)
Age: 89
End: 2024-11-12

## 2024-11-18 ENCOUNTER — NON-APPOINTMENT (OUTPATIENT)
Age: 89
End: 2024-11-18

## 2024-11-18 ENCOUNTER — TRANSCRIPTION ENCOUNTER (OUTPATIENT)
Age: 89
End: 2024-11-18

## 2024-11-18 ENCOUNTER — APPOINTMENT (OUTPATIENT)
Dept: ELECTROPHYSIOLOGY | Facility: CLINIC | Age: 89
End: 2024-11-18
Payer: MEDICARE

## 2024-11-18 DIAGNOSIS — M81.0 AGE-RELATED OSTEOPOROSIS W/OUT CURRENT PATHOLOGICAL FRACTURE: ICD-10-CM

## 2024-11-18 DIAGNOSIS — E53.8 DEFICIENCY OF OTHER SPECIFIED B GROUP VITAMINS: ICD-10-CM

## 2024-11-18 PROCEDURE — 93294 REM INTERROG EVL PM/LDLS PM: CPT

## 2024-11-18 PROCEDURE — 93296 REM INTERROG EVL PM/IDS: CPT

## 2024-11-21 ENCOUNTER — LABORATORY RESULT (OUTPATIENT)
Age: 89
End: 2024-11-21

## 2024-11-21 ENCOUNTER — TRANSCRIPTION ENCOUNTER (OUTPATIENT)
Age: 89
End: 2024-11-21

## 2024-11-22 ENCOUNTER — TRANSCRIPTION ENCOUNTER (OUTPATIENT)
Age: 89
End: 2024-11-22

## 2024-11-25 ENCOUNTER — TRANSCRIPTION ENCOUNTER (OUTPATIENT)
Age: 89
End: 2024-11-25

## 2024-11-27 ENCOUNTER — OFFICE (OUTPATIENT)
Facility: LOCATION | Age: 89
Setting detail: OPHTHALMOLOGY
End: 2024-11-27
Payer: MEDICARE

## 2024-11-27 DIAGNOSIS — H00.15: ICD-10-CM

## 2024-11-27 DIAGNOSIS — H01.004: ICD-10-CM

## 2024-11-27 DIAGNOSIS — H01.001: ICD-10-CM

## 2024-11-27 DIAGNOSIS — H01.002: ICD-10-CM

## 2024-11-27 DIAGNOSIS — H01.005: ICD-10-CM

## 2024-11-27 PROCEDURE — 99213 OFFICE O/P EST LOW 20 MIN: CPT | Performed by: OPHTHALMOLOGY

## 2024-11-27 ASSESSMENT — LID EXAM ASSESSMENTS
OS_BLEPHARITIS: LLL LUL 4+
OD_BLEPHARITIS: RLL RUL 4+

## 2024-11-27 ASSESSMENT — KERATOMETRY
OD_K2POWER_DIOPTERS: 49.25
OS_K1POWER_DIOPTERS: 44.00
OS_K2POWER_DIOPTERS: 44.75
OD_AXISANGLE_DEGREES: 66
OS_AXISANGLE_DEGREES: 95
OD_K1POWER_DIOPTERS: 47.25

## 2024-11-27 ASSESSMENT — REFRACTION_CURRENTRX
OD_SPHERE: -0.25
OD_CYLINDER: -2.50
OS_VPRISM_DIRECTION: SV
OD_AXIS: 149
OD_OVR_VA: 20/
OS_OVR_VA: 20/
OS_AXIS: 121
OS_SPHERE: -1.00
OS_CYLINDER: -0.02
OD_VPRISM_DIRECTION: SV

## 2024-11-27 ASSESSMENT — VISUAL ACUITY
OS_BCVA: 20/60
OD_BCVA: 20/30+/-

## 2024-11-27 ASSESSMENT — REFRACTION_AUTOREFRACTION
OS_CYLINDER: -0.75
OD_SPHERE: 0.00
OS_SPHERE: +0.25
OD_CYLINDER: -2.50
OD_AXIS: 150
OS_AXIS: 50

## 2024-11-27 ASSESSMENT — CONFRONTATIONAL VISUAL FIELD TEST (CVF)
OS_FINDINGS: FULL
OD_FINDINGS: FULL

## 2024-12-02 ENCOUNTER — TRANSCRIPTION ENCOUNTER (OUTPATIENT)
Age: 88
End: 2024-12-02

## 2024-12-06 ENCOUNTER — NON-APPOINTMENT (OUTPATIENT)
Age: 88
End: 2024-12-06

## 2024-12-10 ENCOUNTER — NON-APPOINTMENT (OUTPATIENT)
Age: 88
End: 2024-12-10

## 2024-12-10 ENCOUNTER — APPOINTMENT (OUTPATIENT)
Dept: INTERNAL MEDICINE | Facility: CLINIC | Age: 88
End: 2024-12-10
Payer: MEDICARE

## 2024-12-10 VITALS — DIASTOLIC BLOOD PRESSURE: 60 MMHG | SYSTOLIC BLOOD PRESSURE: 130 MMHG

## 2024-12-10 VITALS
OXYGEN SATURATION: 99 % | SYSTOLIC BLOOD PRESSURE: 120 MMHG | DIASTOLIC BLOOD PRESSURE: 60 MMHG | HEIGHT: 60 IN | WEIGHT: 136 LBS | HEART RATE: 59 BPM | BODY MASS INDEX: 26.7 KG/M2

## 2024-12-10 DIAGNOSIS — R00.1 BRADYCARDIA, UNSPECIFIED: ICD-10-CM

## 2024-12-10 DIAGNOSIS — Z00.00 ENCOUNTER FOR GENERAL ADULT MEDICAL EXAMINATION W/OUT ABNORMAL FINDINGS: ICD-10-CM

## 2024-12-10 DIAGNOSIS — N63.20 UNSPECIFIED LUMP IN THE LEFT BREAST, UNSPECIFIED QUADRANT: ICD-10-CM

## 2024-12-10 DIAGNOSIS — I10 ESSENTIAL (PRIMARY) HYPERTENSION: ICD-10-CM

## 2024-12-10 DIAGNOSIS — N63.10 UNSPECIFIED LUMP IN THE RIGHT BREAST, UNSPECIFIED QUADRANT: ICD-10-CM

## 2024-12-10 DIAGNOSIS — I87.2 VENOUS INSUFFICIENCY (CHRONIC) (PERIPHERAL): ICD-10-CM

## 2024-12-10 DIAGNOSIS — I47.29 OTHER VENTRICULAR TACHYCARDIA: ICD-10-CM

## 2024-12-10 DIAGNOSIS — I35.0 NONRHEUMATIC AORTIC (VALVE) STENOSIS: ICD-10-CM

## 2024-12-10 DIAGNOSIS — E78.5 HYPERLIPIDEMIA, UNSPECIFIED: ICD-10-CM

## 2024-12-10 DIAGNOSIS — Z79.01 LONG TERM (CURRENT) USE OF ANTICOAGULANTS: ICD-10-CM

## 2024-12-10 DIAGNOSIS — Z86.711 PERSONAL HISTORY OF PULMONARY EMBOLISM: ICD-10-CM

## 2024-12-10 DIAGNOSIS — E11.9 TYPE 2 DIABETES MELLITUS W/OUT COMPLICATIONS: ICD-10-CM

## 2024-12-10 DIAGNOSIS — Z95.0 PRESENCE OF CARDIAC PACEMAKER: ICD-10-CM

## 2024-12-10 PROCEDURE — 93000 ELECTROCARDIOGRAM COMPLETE: CPT

## 2024-12-10 PROCEDURE — G0439: CPT

## 2024-12-11 ENCOUNTER — TRANSCRIPTION ENCOUNTER (OUTPATIENT)
Age: 88
End: 2024-12-11

## 2024-12-16 ENCOUNTER — TRANSCRIPTION ENCOUNTER (OUTPATIENT)
Age: 88
End: 2024-12-16

## 2024-12-18 ENCOUNTER — LABORATORY RESULT (OUTPATIENT)
Age: 88
End: 2024-12-18

## 2024-12-19 LAB
APPEARANCE: CLEAR
BILIRUBIN URINE: NEGATIVE
BLOOD URINE: NEGATIVE
COLOR: YELLOW
GLUCOSE QUALITATIVE U: NEGATIVE MG/DL
KETONES URINE: NEGATIVE MG/DL
LEUKOCYTE ESTERASE URINE: NEGATIVE
NITRITE URINE: NEGATIVE
PH URINE: 6
PROTEIN URINE: NEGATIVE MG/DL
SPECIFIC GRAVITY URINE: 1.01
UROBILINOGEN URINE: 0.2 MG/DL

## 2024-12-23 ENCOUNTER — TRANSCRIPTION ENCOUNTER (OUTPATIENT)
Age: 88
End: 2024-12-23

## 2024-12-24 ENCOUNTER — TRANSCRIPTION ENCOUNTER (OUTPATIENT)
Age: 88
End: 2024-12-24

## 2025-01-06 ENCOUNTER — TRANSCRIPTION ENCOUNTER (OUTPATIENT)
Age: 89
End: 2025-01-06

## 2025-01-10 ENCOUNTER — LABORATORY RESULT (OUTPATIENT)
Age: 89
End: 2025-01-10

## 2025-01-13 ENCOUNTER — TRANSCRIPTION ENCOUNTER (OUTPATIENT)
Age: 89
End: 2025-01-13

## 2025-01-16 NOTE — ED ADULT NURSE NOTE - CHIEF COMPLAINT QUOTE
sent for pacemaker/high BP/headache
Apixaban/Eliquis increases your risk for bleeding. Notify your doctor if you experience any of the following side effects: bleeding, coughing or vomiting blood, red or black stool, unexpected pain or swelling, itching or hives, chest pain, chest tightness, trouble breathing, changes in how much or how often you urinate, red or pink urine, numbness or tingling in your feet, or unusual muscle weakness. When Apixaban/Eliquis is taken with other medicines, they can affect how it works. Taking other medications such as aspirin, blood thinners, nonsteroidal anti-inflammatories, and medications that treat depression can increase your risk of bleeding. It is very important to tell your health care provider about all of the other medicines, including over-the-counter medications, herbs, and vitamins you are taking. DO NOT start, stop, or change the dosage of any medicine, including over-the-counter medicines, vitamins, and herbal products without your doctor’s approval. Any products containing aspirin or are nonsteroidal anti-inflammatories lessen the blood’s ability to form clots and add to the effect of Apixaban/Eliquis. Never take aspirin or medicines that contain aspirin without speaking to your doctor.

## 2025-02-03 ENCOUNTER — APPOINTMENT (OUTPATIENT)
Dept: UROLOGY | Facility: CLINIC | Age: 89
End: 2025-02-03
Payer: MEDICARE

## 2025-02-03 ENCOUNTER — LABORATORY RESULT (OUTPATIENT)
Age: 89
End: 2025-02-03

## 2025-02-03 ENCOUNTER — NON-APPOINTMENT (OUTPATIENT)
Age: 89
End: 2025-02-03

## 2025-02-03 VITALS — SYSTOLIC BLOOD PRESSURE: 178 MMHG | DIASTOLIC BLOOD PRESSURE: 74 MMHG

## 2025-02-03 VITALS — HEART RATE: 80 BPM | OXYGEN SATURATION: 100 % | DIASTOLIC BLOOD PRESSURE: 80 MMHG | SYSTOLIC BLOOD PRESSURE: 180 MMHG

## 2025-02-03 DIAGNOSIS — K44.9 DIAPHRAGMATIC HERNIA W/OUT OBSTRUCTION OR GANGRENE: ICD-10-CM

## 2025-02-03 DIAGNOSIS — Z63.4 DISAPPEARANCE AND DEATH OF FAMILY MEMBER: ICD-10-CM

## 2025-02-03 DIAGNOSIS — R35.1 NOCTURIA: ICD-10-CM

## 2025-02-03 DIAGNOSIS — N39.44 NOCTURNAL ENURESIS: ICD-10-CM

## 2025-02-03 PROCEDURE — 99204 OFFICE O/P NEW MOD 45 MIN: CPT

## 2025-02-03 SDOH — SOCIAL STABILITY - SOCIAL INSECURITY: DISSAPEARANCE AND DEATH OF FAMILY MEMBER: Z63.4

## 2025-02-04 ENCOUNTER — NON-APPOINTMENT (OUTPATIENT)
Age: 89
End: 2025-02-04

## 2025-02-04 ENCOUNTER — LABORATORY RESULT (OUTPATIENT)
Age: 89
End: 2025-02-04

## 2025-02-04 ENCOUNTER — TRANSCRIPTION ENCOUNTER (OUTPATIENT)
Age: 89
End: 2025-02-04

## 2025-02-04 ENCOUNTER — APPOINTMENT (OUTPATIENT)
Dept: INTERNAL MEDICINE | Facility: CLINIC | Age: 89
End: 2025-02-04
Payer: MEDICARE

## 2025-02-04 VITALS — DIASTOLIC BLOOD PRESSURE: 68 MMHG | SYSTOLIC BLOOD PRESSURE: 134 MMHG

## 2025-02-04 VITALS — SYSTOLIC BLOOD PRESSURE: 138 MMHG | DIASTOLIC BLOOD PRESSURE: 72 MMHG

## 2025-02-04 VITALS
TEMPERATURE: 97.9 F | BODY MASS INDEX: 27.01 KG/M2 | OXYGEN SATURATION: 98 % | DIASTOLIC BLOOD PRESSURE: 72 MMHG | SYSTOLIC BLOOD PRESSURE: 177 MMHG | HEIGHT: 59 IN | WEIGHT: 134 LBS | HEART RATE: 67 BPM

## 2025-02-04 DIAGNOSIS — I10 ESSENTIAL (PRIMARY) HYPERTENSION: ICD-10-CM

## 2025-02-04 DIAGNOSIS — E11.9 TYPE 2 DIABETES MELLITUS W/OUT COMPLICATIONS: ICD-10-CM

## 2025-02-04 DIAGNOSIS — R21 RASH AND OTHER NONSPECIFIC SKIN ERUPTION: ICD-10-CM

## 2025-02-04 DIAGNOSIS — N39.46 MIXED INCONTINENCE: ICD-10-CM

## 2025-02-04 PROCEDURE — 99214 OFFICE O/P EST MOD 30 MIN: CPT

## 2025-02-04 PROCEDURE — G2211 COMPLEX E/M VISIT ADD ON: CPT

## 2025-02-04 RX ORDER — CLOTRIMAZOLE AND BETAMETHASONE DIPROPIONATE 10; .5 MG/G; MG/G
1-0.05 CREAM TOPICAL
Qty: 45 | Refills: 1 | Status: ACTIVE | COMMUNITY
Start: 2025-02-04 | End: 1900-01-01

## 2025-02-05 ENCOUNTER — TRANSCRIPTION ENCOUNTER (OUTPATIENT)
Age: 89
End: 2025-02-05

## 2025-02-07 ENCOUNTER — OFFICE (OUTPATIENT)
Facility: LOCATION | Age: OVER 89
Setting detail: OPHTHALMOLOGY
End: 2025-02-07
Payer: MEDICARE

## 2025-02-07 ENCOUNTER — RX ONLY (RX ONLY)
Age: OVER 89
End: 2025-02-07

## 2025-02-07 DIAGNOSIS — H01.005: ICD-10-CM

## 2025-02-07 DIAGNOSIS — H01.004: ICD-10-CM

## 2025-02-07 DIAGNOSIS — H01.001: ICD-10-CM

## 2025-02-07 DIAGNOSIS — H00.15: ICD-10-CM

## 2025-02-07 DIAGNOSIS — H01.002: ICD-10-CM

## 2025-02-07 PROCEDURE — 99213 OFFICE O/P EST LOW 20 MIN: CPT | Performed by: OPHTHALMOLOGY

## 2025-02-07 ASSESSMENT — REFRACTION_CURRENTRX
OD_CYLINDER: -2.50
OS_SPHERE: -1.00
OS_CYLINDER: -0.02
OS_VPRISM_DIRECTION: SV
OS_OVR_VA: 20/
OD_AXIS: 149
OD_VPRISM_DIRECTION: SV
OD_OVR_VA: 20/
OD_SPHERE: -0.25
OS_AXIS: 121

## 2025-02-07 ASSESSMENT — LID EXAM ASSESSMENTS
OD_BLEPHARITIS: RLL RUL 4+
OS_BLEPHARITIS: LLL LUL 4+

## 2025-02-07 ASSESSMENT — REFRACTION_AUTOREFRACTION
OS_SPHERE: +0.25
OS_AXIS: 50
OD_CYLINDER: -2.50
OD_SPHERE: 0.00
OD_AXIS: 150
OS_CYLINDER: -0.75

## 2025-02-07 ASSESSMENT — KERATOMETRY
OS_AXISANGLE_DEGREES: 95
OS_K2POWER_DIOPTERS: 44.75
OS_K1POWER_DIOPTERS: 44.00
OD_AXISANGLE_DEGREES: 66
OD_K2POWER_DIOPTERS: 49.25
OD_K1POWER_DIOPTERS: 47.25

## 2025-02-07 ASSESSMENT — VISUAL ACUITY
OD_BCVA: 20/30-2
OS_BCVA: 20/60+

## 2025-02-07 ASSESSMENT — TONOMETRY
OD_IOP_MMHG: 14
OS_IOP_MMHG: 15

## 2025-02-07 ASSESSMENT — CONFRONTATIONAL VISUAL FIELD TEST (CVF)
OD_FINDINGS: FULL
OS_FINDINGS: FULL

## 2025-02-10 ENCOUNTER — OFFICE (OUTPATIENT)
Facility: LOCATION | Age: OVER 89
Setting detail: OPHTHALMOLOGY
End: 2025-02-10
Payer: MEDICARE

## 2025-02-10 DIAGNOSIS — H01.004: ICD-10-CM

## 2025-02-10 DIAGNOSIS — H01.001: ICD-10-CM

## 2025-02-10 DIAGNOSIS — H01.005: ICD-10-CM

## 2025-02-10 DIAGNOSIS — H01.002: ICD-10-CM

## 2025-02-10 DIAGNOSIS — H00.15: ICD-10-CM

## 2025-02-10 PROCEDURE — 99213 OFFICE O/P EST LOW 20 MIN: CPT | Performed by: OPHTHALMOLOGY

## 2025-02-10 ASSESSMENT — REFRACTION_AUTOREFRACTION
OS_AXIS: 50
OS_CYLINDER: -0.75
OD_SPHERE: 0.00
OD_AXIS: 150
OS_SPHERE: +0.25
OD_CYLINDER: -2.50

## 2025-02-10 ASSESSMENT — KERATOMETRY
OS_K1POWER_DIOPTERS: 44.00
OS_AXISANGLE_DEGREES: 95
OD_K1POWER_DIOPTERS: 47.25
OS_K2POWER_DIOPTERS: 44.75
OD_K2POWER_DIOPTERS: 49.25
OD_AXISANGLE_DEGREES: 66

## 2025-02-10 ASSESSMENT — LID EXAM ASSESSMENTS
OS_BLEPHARITIS: LLL LUL 4+
OD_BLEPHARITIS: RLL RUL 4+

## 2025-02-10 ASSESSMENT — VISUAL ACUITY
OD_BCVA: 20/30-1
OS_BCVA: 20/60+2

## 2025-02-14 ENCOUNTER — TRANSCRIPTION ENCOUNTER (OUTPATIENT)
Age: 89
End: 2025-02-14

## 2025-02-18 ENCOUNTER — NON-APPOINTMENT (OUTPATIENT)
Age: 89
End: 2025-02-18

## 2025-02-18 ENCOUNTER — APPOINTMENT (OUTPATIENT)
Dept: ELECTROPHYSIOLOGY | Facility: CLINIC | Age: 89
End: 2025-02-18
Payer: MEDICARE

## 2025-02-18 PROCEDURE — 93294 REM INTERROG EVL PM/LDLS PM: CPT

## 2025-02-18 PROCEDURE — 93296 REM INTERROG EVL PM/IDS: CPT

## 2025-02-27 ENCOUNTER — LABORATORY RESULT (OUTPATIENT)
Age: 89
End: 2025-02-27

## 2025-02-28 ENCOUNTER — NON-APPOINTMENT (OUTPATIENT)
Age: 89
End: 2025-02-28

## 2025-03-03 ENCOUNTER — NON-APPOINTMENT (OUTPATIENT)
Age: 89
End: 2025-03-03

## 2025-03-03 ENCOUNTER — APPOINTMENT (OUTPATIENT)
Dept: INTERNAL MEDICINE | Facility: CLINIC | Age: 89
End: 2025-03-03
Payer: MEDICARE

## 2025-03-03 VITALS
SYSTOLIC BLOOD PRESSURE: 125 MMHG | DIASTOLIC BLOOD PRESSURE: 67 MMHG | HEART RATE: 60 BPM | WEIGHT: 132 LBS | BODY MASS INDEX: 26.61 KG/M2 | HEIGHT: 59 IN | OXYGEN SATURATION: 95 %

## 2025-03-03 VITALS — DIASTOLIC BLOOD PRESSURE: 76 MMHG | SYSTOLIC BLOOD PRESSURE: 125 MMHG

## 2025-03-03 DIAGNOSIS — I35.0 NONRHEUMATIC AORTIC (VALVE) STENOSIS: ICD-10-CM

## 2025-03-03 DIAGNOSIS — S09.90XA UNSPECIFIED INJURY OF HEAD, INITIAL ENCOUNTER: ICD-10-CM

## 2025-03-03 DIAGNOSIS — E11.9 TYPE 2 DIABETES MELLITUS W/OUT COMPLICATIONS: ICD-10-CM
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
## 2025-03-03 DIAGNOSIS — Z79.01 LONG TERM (CURRENT) USE OF ANTICOAGULANTS: ICD-10-CM

## 2025-03-03 PROCEDURE — G2211 COMPLEX E/M VISIT ADD ON: CPT

## 2025-03-03 PROCEDURE — 99214 OFFICE O/P EST MOD 30 MIN: CPT

## 2025-03-04 ENCOUNTER — TRANSCRIPTION ENCOUNTER (OUTPATIENT)
Age: 89
End: 2025-03-04

## 2025-03-20 ENCOUNTER — TRANSCRIPTION ENCOUNTER (OUTPATIENT)
Age: 89
End: 2025-03-20

## 2025-03-27 ENCOUNTER — LABORATORY RESULT (OUTPATIENT)
Age: 89
End: 2025-03-27

## 2025-04-02 ENCOUNTER — TRANSCRIPTION ENCOUNTER (OUTPATIENT)
Age: 89
End: 2025-04-02

## 2025-04-17 ENCOUNTER — LABORATORY RESULT (OUTPATIENT)
Age: 89
End: 2025-04-17

## 2025-04-28 ENCOUNTER — TRANSCRIPTION ENCOUNTER (OUTPATIENT)
Age: 89
End: 2025-04-28

## 2025-04-30 ENCOUNTER — APPOINTMENT (OUTPATIENT)
Dept: INTERNAL MEDICINE | Facility: CLINIC | Age: 89
End: 2025-04-30
Payer: MEDICARE

## 2025-04-30 VITALS — OXYGEN SATURATION: 97 % | HEART RATE: 96 BPM | WEIGHT: 132 LBS | HEIGHT: 59 IN | BODY MASS INDEX: 26.61 KG/M2

## 2025-04-30 VITALS — DIASTOLIC BLOOD PRESSURE: 76 MMHG | HEART RATE: 68 BPM | SYSTOLIC BLOOD PRESSURE: 125 MMHG

## 2025-04-30 DIAGNOSIS — I35.0 NONRHEUMATIC AORTIC (VALVE) STENOSIS: ICD-10-CM
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
## 2025-04-30 DIAGNOSIS — E11.9 TYPE 2 DIABETES MELLITUS W/OUT COMPLICATIONS: ICD-10-CM

## 2025-04-30 DIAGNOSIS — L65.9 NONSCARRING HAIR LOSS, UNSPECIFIED: ICD-10-CM

## 2025-04-30 DIAGNOSIS — M79.89 OTHER SPECIFIED SOFT TISSUE DISORDERS: ICD-10-CM

## 2025-04-30 DIAGNOSIS — R60.9 EDEMA, UNSPECIFIED: ICD-10-CM

## 2025-04-30 DIAGNOSIS — Z95.0 PRESENCE OF CARDIAC PACEMAKER: ICD-10-CM

## 2025-04-30 DIAGNOSIS — Z79.01 LONG TERM (CURRENT) USE OF ANTICOAGULANTS: ICD-10-CM

## 2025-04-30 LAB
INR PPP: 4 RATIO
POCT-PROTHROMBIN TIME: 47.9 SECS

## 2025-04-30 PROCEDURE — 99214 OFFICE O/P EST MOD 30 MIN: CPT

## 2025-04-30 PROCEDURE — 85610 PROTHROMBIN TIME: CPT | Mod: QW

## 2025-04-30 PROCEDURE — G2211 COMPLEX E/M VISIT ADD ON: CPT

## 2025-05-05 ENCOUNTER — LABORATORY RESULT (OUTPATIENT)
Age: 89
End: 2025-05-05

## 2025-05-05 ENCOUNTER — TRANSCRIPTION ENCOUNTER (OUTPATIENT)
Age: 89
End: 2025-05-05

## 2025-05-12 ENCOUNTER — LABORATORY RESULT (OUTPATIENT)
Age: 89
End: 2025-05-12

## 2025-05-12 ENCOUNTER — TRANSCRIPTION ENCOUNTER (OUTPATIENT)
Age: 89
End: 2025-05-12

## 2025-05-19 ENCOUNTER — TRANSCRIPTION ENCOUNTER (OUTPATIENT)
Age: 89
End: 2025-05-19

## 2025-05-21 ENCOUNTER — NON-APPOINTMENT (OUTPATIENT)
Age: 89
End: 2025-05-21

## 2025-05-21 ENCOUNTER — TRANSCRIPTION ENCOUNTER (OUTPATIENT)
Age: 89
End: 2025-05-21

## 2025-05-21 ENCOUNTER — APPOINTMENT (OUTPATIENT)
Dept: ELECTROPHYSIOLOGY | Facility: CLINIC | Age: 89
End: 2025-05-21
Payer: MEDICARE

## 2025-05-21 PROCEDURE — 93296 REM INTERROG EVL PM/IDS: CPT

## 2025-05-21 PROCEDURE — 93294 REM INTERROG EVL PM/LDLS PM: CPT

## 2025-05-28 ENCOUNTER — TRANSCRIPTION ENCOUNTER (OUTPATIENT)
Age: 89
End: 2025-05-28

## 2025-06-02 ENCOUNTER — LABORATORY RESULT (OUTPATIENT)
Age: 89
End: 2025-06-02

## 2025-06-02 ENCOUNTER — TRANSCRIPTION ENCOUNTER (OUTPATIENT)
Age: 89
End: 2025-06-02

## 2025-06-03 ENCOUNTER — LABORATORY RESULT (OUTPATIENT)
Age: 89
End: 2025-06-03

## 2025-06-04 ENCOUNTER — TRANSCRIPTION ENCOUNTER (OUTPATIENT)
Age: 89
End: 2025-06-04

## 2025-06-25 ENCOUNTER — APPOINTMENT (OUTPATIENT)
Dept: INTERNAL MEDICINE | Facility: CLINIC | Age: 89
End: 2025-06-25
Payer: MEDICARE

## 2025-06-25 VITALS
WEIGHT: 132 LBS | BODY MASS INDEX: 26.61 KG/M2 | HEIGHT: 59 IN | TEMPERATURE: 98.3 F | HEART RATE: 88 BPM | OXYGEN SATURATION: 98 %

## 2025-06-25 VITALS — DIASTOLIC BLOOD PRESSURE: 80 MMHG | SYSTOLIC BLOOD PRESSURE: 116 MMHG

## 2025-06-25 PROBLEM — J06.9 ACUTE URI: Status: ACTIVE | Noted: 2025-06-25 | Resolved: 2025-07-25

## 2025-06-25 PROCEDURE — G2211 COMPLEX E/M VISIT ADD ON: CPT

## 2025-06-25 PROCEDURE — 36415 COLL VENOUS BLD VENIPUNCTURE: CPT

## 2025-06-25 PROCEDURE — 99214 OFFICE O/P EST MOD 30 MIN: CPT

## 2025-06-26 LAB
ALBUMIN SERPL ELPH-MCNC: 4 G/DL
ALP BLD-CCNC: 73 U/L
ALT SERPL-CCNC: 19 U/L
ANION GAP SERPL CALC-SCNC: 11 MMOL/L
AST SERPL-CCNC: 27 U/L
BASOPHILS # BLD AUTO: 0.04 K/UL
BASOPHILS NFR BLD AUTO: 0.7 %
BILIRUB SERPL-MCNC: 0.9 MG/DL
BUN SERPL-MCNC: 21 MG/DL
CALCIUM SERPL-MCNC: 9.5 MG/DL
CHLORIDE SERPL-SCNC: 107 MMOL/L
CO2 SERPL-SCNC: 27 MMOL/L
CREAT SERPL-MCNC: 1.15 MG/DL
EGFRCR SERPLBLD CKD-EPI 2021: 43 ML/MIN/1.73M2
EOSINOPHIL # BLD AUTO: 0.08 K/UL
EOSINOPHIL NFR BLD AUTO: 1.4 %
GLUCOSE SERPL-MCNC: 87 MG/DL
HCT VFR BLD CALC: 39.1 %
HGB BLD-MCNC: 11.9 G/DL
IMM GRANULOCYTES NFR BLD AUTO: 0.2 %
INR PPP: 2.15 RATIO
LYMPHOCYTES # BLD AUTO: 1.22 K/UL
LYMPHOCYTES NFR BLD AUTO: 20.9 %
MAN DIFF?: NORMAL
MCHC RBC-ENTMCNC: 27.8 PG
MCHC RBC-ENTMCNC: 30.4 G/DL
MCV RBC AUTO: 91.4 FL
MONOCYTES # BLD AUTO: 0.42 K/UL
MONOCYTES NFR BLD AUTO: 7.2 %
NEUTROPHILS # BLD AUTO: 4.07 K/UL
NEUTROPHILS NFR BLD AUTO: 69.6 %
PLATELET # BLD AUTO: 159 K/UL
POTASSIUM SERPL-SCNC: 5.2 MMOL/L
PROT SERPL-MCNC: 6.4 G/DL
PT BLD: 25.2 SEC
RBC # BLD: 4.28 M/UL
RBC # FLD: 14.1 %
RESP PATH DNA+RNA PNL NPH NAA+NON-PROBE: NOT DETECTED
SARS-COV-2 RNA RESP QL NAA+PROBE: NOT DETECTED
SODIUM SERPL-SCNC: 144 MMOL/L
WBC # FLD AUTO: 5.84 K/UL

## 2025-07-01 ENCOUNTER — TRANSCRIPTION ENCOUNTER (OUTPATIENT)
Age: 89
End: 2025-07-01

## 2025-07-07 ENCOUNTER — LABORATORY RESULT (OUTPATIENT)
Age: 89
End: 2025-07-07

## 2025-07-08 ENCOUNTER — NON-APPOINTMENT (OUTPATIENT)
Age: 89
End: 2025-07-08

## 2025-08-04 ENCOUNTER — LABORATORY RESULT (OUTPATIENT)
Age: 89
End: 2025-08-04

## 2025-08-05 ENCOUNTER — NON-APPOINTMENT (OUTPATIENT)
Age: 89
End: 2025-08-05

## 2025-08-05 ENCOUNTER — TRANSCRIPTION ENCOUNTER (OUTPATIENT)
Age: 89
End: 2025-08-05

## 2025-08-20 ENCOUNTER — APPOINTMENT (OUTPATIENT)
Dept: ELECTROPHYSIOLOGY | Facility: CLINIC | Age: 89
End: 2025-08-20

## 2025-08-25 ENCOUNTER — APPOINTMENT (OUTPATIENT)
Dept: ELECTROPHYSIOLOGY | Facility: CLINIC | Age: 89
End: 2025-08-25
Payer: MEDICARE

## 2025-08-25 ENCOUNTER — RESULT CHARGE (OUTPATIENT)
Age: 89
End: 2025-08-25

## 2025-08-25 ENCOUNTER — NON-APPOINTMENT (OUTPATIENT)
Age: 89
End: 2025-08-25

## 2025-08-25 VITALS — HEART RATE: 60 BPM | OXYGEN SATURATION: 97 % | DIASTOLIC BLOOD PRESSURE: 58 MMHG | SYSTOLIC BLOOD PRESSURE: 117 MMHG

## 2025-08-25 DIAGNOSIS — I49.5 SICK SINUS SYNDROME: ICD-10-CM

## 2025-08-25 PROCEDURE — 93000 ELECTROCARDIOGRAM COMPLETE: CPT | Mod: 59

## 2025-08-25 PROCEDURE — 93280 PM DEVICE PROGR EVAL DUAL: CPT

## 2025-08-25 PROCEDURE — 99213 OFFICE O/P EST LOW 20 MIN: CPT

## 2025-08-26 ENCOUNTER — TRANSCRIPTION ENCOUNTER (OUTPATIENT)
Age: 89
End: 2025-08-26

## 2025-08-28 ENCOUNTER — APPOINTMENT (OUTPATIENT)
Dept: INTERNAL MEDICINE | Facility: CLINIC | Age: 89
End: 2025-08-28
Payer: MEDICARE

## 2025-08-28 VITALS — HEART RATE: 81 BPM | BODY MASS INDEX: 26.61 KG/M2 | WEIGHT: 132 LBS | HEIGHT: 59 IN | OXYGEN SATURATION: 98 %

## 2025-08-28 VITALS — SYSTOLIC BLOOD PRESSURE: 120 MMHG | DIASTOLIC BLOOD PRESSURE: 80 MMHG

## 2025-08-28 DIAGNOSIS — R05.9 COUGH, UNSPECIFIED: ICD-10-CM

## 2025-08-28 DIAGNOSIS — R06.09 OTHER FORMS OF DYSPNEA: ICD-10-CM

## 2025-08-28 DIAGNOSIS — I47.29 OTHER VENTRICULAR TACHYCARDIA: ICD-10-CM

## 2025-08-28 DIAGNOSIS — Z95.0 PRESENCE OF CARDIAC PACEMAKER: ICD-10-CM

## 2025-08-28 DIAGNOSIS — E78.5 HYPERLIPIDEMIA, UNSPECIFIED: ICD-10-CM

## 2025-08-28 DIAGNOSIS — I10 ESSENTIAL (PRIMARY) HYPERTENSION: ICD-10-CM

## 2025-08-28 PROCEDURE — G2211 COMPLEX E/M VISIT ADD ON: CPT

## 2025-08-28 PROCEDURE — 99214 OFFICE O/P EST MOD 30 MIN: CPT

## 2025-08-28 PROCEDURE — 36415 COLL VENOUS BLD VENIPUNCTURE: CPT

## 2025-08-29 ENCOUNTER — TRANSCRIPTION ENCOUNTER (OUTPATIENT)
Age: 89
End: 2025-08-29

## 2025-08-29 DIAGNOSIS — E11.9 TYPE 2 DIABETES MELLITUS W/OUT COMPLICATIONS: ICD-10-CM

## 2025-08-29 LAB
25(OH)D3 SERPL-MCNC: 38.9 NG/ML
ALBUMIN SERPL ELPH-MCNC: 4.2 G/DL
ALP BLD-CCNC: 73 U/L
ALT SERPL-CCNC: 20 U/L
ANION GAP SERPL CALC-SCNC: 13 MMOL/L
AST SERPL-CCNC: 27 U/L
BASOPHILS # BLD AUTO: 0.04 K/UL
BASOPHILS NFR BLD AUTO: 0.7 %
BILIRUB SERPL-MCNC: 0.9 MG/DL
BUN SERPL-MCNC: 19 MG/DL
CALCIUM SERPL-MCNC: 9.6 MG/DL
CHLORIDE SERPL-SCNC: 105 MMOL/L
CO2 SERPL-SCNC: 28 MMOL/L
CREAT SERPL-MCNC: 1.04 MG/DL
EGFRCR SERPLBLD CKD-EPI 2021: 49 ML/MIN/1.73M2
EOSINOPHIL # BLD AUTO: 0.13 K/UL
EOSINOPHIL NFR BLD AUTO: 2.2 %
ESTIMATED AVERAGE GLUCOSE: 126 MG/DL
GLUCOSE SERPL-MCNC: 106 MG/DL
HBA1C MFR BLD HPLC: 6 %
HCT VFR BLD CALC: 41 %
HGB BLD-MCNC: 12.7 G/DL
IMM GRANULOCYTES NFR BLD AUTO: 0.3 %
INR PPP: 2.68 RATIO
LYMPHOCYTES # BLD AUTO: 1.29 K/UL
LYMPHOCYTES NFR BLD AUTO: 21.8 %
MAN DIFF?: NORMAL
MCHC RBC-ENTMCNC: 27.6 PG
MCHC RBC-ENTMCNC: 31 G/DL
MCV RBC AUTO: 89.1 FL
MONOCYTES # BLD AUTO: 0.45 K/UL
MONOCYTES NFR BLD AUTO: 7.6 %
NEUTROPHILS # BLD AUTO: 3.99 K/UL
NEUTROPHILS NFR BLD AUTO: 67.4 %
PLATELET # BLD AUTO: 147 K/UL
POTASSIUM SERPL-SCNC: 5.7 MMOL/L
PROT SERPL-MCNC: 6.6 G/DL
PT BLD: 30.8 SEC
RBC # BLD: 4.6 M/UL
RBC # FLD: 14.4 %
SODIUM SERPL-SCNC: 146 MMOL/L
TSH SERPL-ACNC: 2.43 UIU/ML
VIT B12 SERPL-MCNC: 486 PG/ML
WBC # FLD AUTO: 5.92 K/UL

## 2025-09-02 PROBLEM — I49.5 SINUS NODE DYSFUNCTION: Status: ACTIVE | Noted: 2025-09-02

## 2025-09-17 ENCOUNTER — TRANSCRIPTION ENCOUNTER (OUTPATIENT)
Age: 89
End: 2025-09-17

## 2025-09-17 DIAGNOSIS — L74.512 PRIMARY FOCAL HYPERHIDROSIS, PALMS: ICD-10-CM

## 2025-09-18 ENCOUNTER — TRANSCRIPTION ENCOUNTER (OUTPATIENT)
Age: 89
End: 2025-09-18

## 2025-09-18 ENCOUNTER — LABORATORY RESULT (OUTPATIENT)
Age: 89
End: 2025-09-18